# Patient Record
Sex: FEMALE | Race: WHITE | Employment: OTHER | URBAN - METROPOLITAN AREA
[De-identification: names, ages, dates, MRNs, and addresses within clinical notes are randomized per-mention and may not be internally consistent; named-entity substitution may affect disease eponyms.]

---

## 2017-07-23 ENCOUNTER — ANESTHESIA (OUTPATIENT)
Dept: OPERATING ROOM | Age: 37
End: 2017-07-23
Payer: COMMERCIAL

## 2017-07-23 ENCOUNTER — APPOINTMENT (OUTPATIENT)
Dept: ULTRASOUND IMAGING | Age: 37
End: 2017-07-23
Payer: COMMERCIAL

## 2017-07-23 ENCOUNTER — ANESTHESIA EVENT (OUTPATIENT)
Dept: OPERATING ROOM | Age: 37
End: 2017-07-23
Payer: COMMERCIAL

## 2017-07-23 ENCOUNTER — HOSPITAL ENCOUNTER (EMERGENCY)
Age: 37
Discharge: HOME | End: 2017-07-23
Attending: EMERGENCY MEDICINE | Admitting: OBSTETRICS & GYNECOLOGY
Payer: COMMERCIAL

## 2017-07-23 VITALS
DIASTOLIC BLOOD PRESSURE: 58 MMHG | HEART RATE: 77 BPM | HEIGHT: 69 IN | BODY MASS INDEX: 25.92 KG/M2 | RESPIRATION RATE: 16 BRPM | TEMPERATURE: 97.9 F | SYSTOLIC BLOOD PRESSURE: 144 MMHG | OXYGEN SATURATION: 100 % | WEIGHT: 175 LBS

## 2017-07-23 VITALS — DIASTOLIC BLOOD PRESSURE: 98 MMHG | OXYGEN SATURATION: 100 % | TEMPERATURE: 97.8 F | SYSTOLIC BLOOD PRESSURE: 138 MMHG

## 2017-07-23 DIAGNOSIS — O03.9 SPONTANEOUS ABORTION: Primary | ICD-10-CM

## 2017-07-23 DIAGNOSIS — D72.828 OTHER ELEVATED WHITE BLOOD CELL (WBC) COUNT: ICD-10-CM

## 2017-07-23 LAB
-: ABNORMAL
ABO/RH: NORMAL
AMORPHOUS: ABNORMAL
ANTIBODY SCREEN: NEGATIVE
ARM BAND NUMBER: NORMAL
BACTERIA: ABNORMAL
BILIRUBIN URINE: NEGATIVE
CASTS UA: ABNORMAL /LPF (ref 0–8)
COLOR: YELLOW
COMMENT UA: ABNORMAL
CRYSTALS, UA: ABNORMAL /HPF
EPITHELIAL CELLS UA: ABNORMAL /HPF (ref 0–5)
EXPIRATION DATE: NORMAL
GLUCOSE URINE: NEGATIVE
HCG QUANTITATIVE: 174 IU/L
HCT VFR BLD CALC: 37 % (ref 36–46)
HEMOGLOBIN: 12 G/DL (ref 12–16)
KETONES, URINE: NEGATIVE
LEUKOCYTE ESTERASE, URINE: NEGATIVE
MCH RBC QN AUTO: 23.4 PG (ref 26–34)
MCHC RBC AUTO-ENTMCNC: 32.5 G/DL (ref 31–37)
MCV RBC AUTO: 72 FL (ref 80–100)
MUCUS: ABNORMAL
NITRITE, URINE: NEGATIVE
OTHER OBSERVATIONS UA: ABNORMAL
PDW BLD-RTO: 18.9 % (ref 12.5–15.4)
PH UA: 6.5 (ref 5–8)
PLATELET # BLD: 323 K/UL (ref 140–450)
PMV BLD AUTO: 9 FL (ref 6–12)
PROTEIN UA: ABNORMAL
RBC # BLD: 5.14 M/UL (ref 4–5.2)
RBC UA: ABNORMAL /HPF (ref 0–4)
RENAL EPITHELIAL, UA: ABNORMAL /HPF
SPECIFIC GRAVITY UA: 1.03 (ref 1–1.03)
TRICHOMONAS: ABNORMAL
TURBIDITY: CLEAR
URINE HGB: ABNORMAL
UROBILINOGEN, URINE: NORMAL
WBC # BLD: 12 K/UL (ref 3.5–11)
WBC UA: ABNORMAL /HPF (ref 0–5)
YEAST: ABNORMAL

## 2017-07-23 PROCEDURE — 85027 COMPLETE CBC AUTOMATED: CPT

## 2017-07-23 PROCEDURE — 6360000002 HC RX W HCPCS: Performed by: NURSE ANESTHETIST, CERTIFIED REGISTERED

## 2017-07-23 PROCEDURE — 87491 CHLMYD TRACH DNA AMP PROBE: CPT

## 2017-07-23 PROCEDURE — 81001 URINALYSIS AUTO W/SCOPE: CPT

## 2017-07-23 PROCEDURE — 86900 BLOOD TYPING SEROLOGIC ABO: CPT

## 2017-07-23 PROCEDURE — 7100000001 HC PACU RECOVERY - ADDTL 15 MIN: Performed by: OBSTETRICS & GYNECOLOGY

## 2017-07-23 PROCEDURE — 87660 TRICHOMONAS VAGIN DIR PROBE: CPT

## 2017-07-23 PROCEDURE — 87510 GARDNER VAG DNA DIR PROBE: CPT

## 2017-07-23 PROCEDURE — 7100000011 HC PHASE II RECOVERY - ADDTL 15 MIN: Performed by: OBSTETRICS & GYNECOLOGY

## 2017-07-23 PROCEDURE — 2500000003 HC RX 250 WO HCPCS: Performed by: NURSE ANESTHETIST, CERTIFIED REGISTERED

## 2017-07-23 PROCEDURE — 87591 N.GONORRHOEAE DNA AMP PROB: CPT

## 2017-07-23 PROCEDURE — 3600000012 HC SURGERY LEVEL 2 ADDTL 15MIN: Performed by: OBSTETRICS & GYNECOLOGY

## 2017-07-23 PROCEDURE — 2580000003 HC RX 258: Performed by: NURSE ANESTHETIST, CERTIFIED REGISTERED

## 2017-07-23 PROCEDURE — 7100000010 HC PHASE II RECOVERY - FIRST 15 MIN: Performed by: OBSTETRICS & GYNECOLOGY

## 2017-07-23 PROCEDURE — 86901 BLOOD TYPING SEROLOGIC RH(D): CPT

## 2017-07-23 PROCEDURE — 88300 SURGICAL PATH GROSS: CPT

## 2017-07-23 PROCEDURE — 88305 TISSUE EXAM BY PATHOLOGIST: CPT

## 2017-07-23 PROCEDURE — 3600000002 HC SURGERY LEVEL 2 BASE: Performed by: OBSTETRICS & GYNECOLOGY

## 2017-07-23 PROCEDURE — 6360000002 HC RX W HCPCS: Performed by: EMERGENCY MEDICINE

## 2017-07-23 PROCEDURE — 6370000000 HC RX 637 (ALT 250 FOR IP): Performed by: STUDENT IN AN ORGANIZED HEALTH CARE EDUCATION/TRAINING PROGRAM

## 2017-07-23 PROCEDURE — 6360000002 HC RX W HCPCS: Performed by: OBSTETRICS & GYNECOLOGY

## 2017-07-23 PROCEDURE — 96374 THER/PROPH/DIAG INJ IV PUSH: CPT

## 2017-07-23 PROCEDURE — 2580000003 HC RX 258: Performed by: OBSTETRICS & GYNECOLOGY

## 2017-07-23 PROCEDURE — 87480 CANDIDA DNA DIR PROBE: CPT

## 2017-07-23 PROCEDURE — 3700000001 HC ADD 15 MINUTES (ANESTHESIA): Performed by: OBSTETRICS & GYNECOLOGY

## 2017-07-23 PROCEDURE — 6370000000 HC RX 637 (ALT 250 FOR IP): Performed by: ANESTHESIOLOGY

## 2017-07-23 PROCEDURE — 87086 URINE CULTURE/COLONY COUNT: CPT

## 2017-07-23 PROCEDURE — 59821 TREATMENT OF MISCARRIAGE: CPT | Performed by: OBSTETRICS & GYNECOLOGY

## 2017-07-23 PROCEDURE — 3700000000 HC ANESTHESIA ATTENDED CARE: Performed by: OBSTETRICS & GYNECOLOGY

## 2017-07-23 PROCEDURE — 6370000000 HC RX 637 (ALT 250 FOR IP): Performed by: EMERGENCY MEDICINE

## 2017-07-23 PROCEDURE — 86850 RBC ANTIBODY SCREEN: CPT

## 2017-07-23 PROCEDURE — 99284 EMERGENCY DEPT VISIT MOD MDM: CPT

## 2017-07-23 PROCEDURE — 84702 CHORIONIC GONADOTROPIN TEST: CPT

## 2017-07-23 PROCEDURE — 7100000000 HC PACU RECOVERY - FIRST 15 MIN: Performed by: OBSTETRICS & GYNECOLOGY

## 2017-07-23 RX ORDER — DOXYCYCLINE HYCLATE 100 MG
200 TABLET ORAL ONCE
Status: COMPLETED | OUTPATIENT
Start: 2017-07-23 | End: 2017-07-23

## 2017-07-23 RX ORDER — LIDOCAINE HYDROCHLORIDE 10 MG/ML
INJECTION, SOLUTION EPIDURAL; INFILTRATION; INTRACAUDAL; PERINEURAL PRN
Status: DISCONTINUED | OUTPATIENT
Start: 2017-07-23 | End: 2017-07-23 | Stop reason: SDUPTHER

## 2017-07-23 RX ORDER — MIDAZOLAM HYDROCHLORIDE 1 MG/ML
INJECTION INTRAMUSCULAR; INTRAVENOUS PRN
Status: DISCONTINUED | OUTPATIENT
Start: 2017-07-23 | End: 2017-07-23 | Stop reason: SDUPTHER

## 2017-07-23 RX ORDER — PROPOFOL 10 MG/ML
INJECTION, EMULSION INTRAVENOUS PRN
Status: DISCONTINUED | OUTPATIENT
Start: 2017-07-23 | End: 2017-07-23 | Stop reason: SDUPTHER

## 2017-07-23 RX ORDER — SODIUM CHLORIDE 9 MG/ML
INJECTION, SOLUTION INTRAVENOUS CONTINUOUS PRN
Status: DISCONTINUED | OUTPATIENT
Start: 2017-07-23 | End: 2017-07-23 | Stop reason: SDUPTHER

## 2017-07-23 RX ORDER — DIPHENHYDRAMINE HYDROCHLORIDE 50 MG/ML
INJECTION INTRAMUSCULAR; INTRAVENOUS PRN
Status: DISCONTINUED | OUTPATIENT
Start: 2017-07-23 | End: 2017-07-23 | Stop reason: SDUPTHER

## 2017-07-23 RX ORDER — ONDANSETRON 2 MG/ML
INJECTION INTRAMUSCULAR; INTRAVENOUS PRN
Status: DISCONTINUED | OUTPATIENT
Start: 2017-07-23 | End: 2017-07-23 | Stop reason: SDUPTHER

## 2017-07-23 RX ORDER — DEXAMETHASONE SODIUM PHOSPHATE 10 MG/ML
INJECTION INTRAMUSCULAR; INTRAVENOUS PRN
Status: DISCONTINUED | OUTPATIENT
Start: 2017-07-23 | End: 2017-07-23 | Stop reason: SDUPTHER

## 2017-07-23 RX ORDER — METRONIDAZOLE 500 MG/1
500 TABLET ORAL 2 TIMES DAILY
Qty: 10 TABLET | Refills: 0 | Status: SHIPPED | OUTPATIENT
Start: 2017-07-23 | End: 2017-07-28

## 2017-07-23 RX ORDER — FENTANYL CITRATE 50 UG/ML
INJECTION, SOLUTION INTRAMUSCULAR; INTRAVENOUS PRN
Status: DISCONTINUED | OUTPATIENT
Start: 2017-07-23 | End: 2017-07-23 | Stop reason: SDUPTHER

## 2017-07-23 RX ORDER — OXYCODONE HYDROCHLORIDE AND ACETAMINOPHEN 5; 325 MG/1; MG/1
1 TABLET ORAL EVERY 4 HOURS PRN
Qty: 30 TABLET | Refills: 0 | Status: SHIPPED | OUTPATIENT
Start: 2017-07-23 | End: 2019-03-28 | Stop reason: ALTCHOICE

## 2017-07-23 RX ORDER — MORPHINE SULFATE 4 MG/ML
4 INJECTION, SOLUTION INTRAMUSCULAR; INTRAVENOUS ONCE
Status: COMPLETED | OUTPATIENT
Start: 2017-07-23 | End: 2017-07-23

## 2017-07-23 RX ORDER — PROMETHAZINE HYDROCHLORIDE 25 MG/ML
6.25 INJECTION, SOLUTION INTRAMUSCULAR; INTRAVENOUS
Status: DISCONTINUED | OUTPATIENT
Start: 2017-07-23 | End: 2017-07-23 | Stop reason: HOSPADM

## 2017-07-23 RX ORDER — KETOROLAC TROMETHAMINE 30 MG/ML
30 INJECTION, SOLUTION INTRAMUSCULAR; INTRAVENOUS ONCE
Status: COMPLETED | OUTPATIENT
Start: 2017-07-23 | End: 2017-07-23

## 2017-07-23 RX ORDER — FLUCONAZOLE 100 MG/1
200 TABLET ORAL DAILY
Qty: 2 TABLET | Refills: 1 | Status: SHIPPED | OUTPATIENT
Start: 2017-07-23 | End: 2017-07-24

## 2017-07-23 RX ORDER — SUCCINYLCHOLINE CHLORIDE 20 MG/ML
INJECTION INTRAMUSCULAR; INTRAVENOUS PRN
Status: DISCONTINUED | OUTPATIENT
Start: 2017-07-23 | End: 2017-07-23 | Stop reason: SDUPTHER

## 2017-07-23 RX ORDER — OXYCODONE HYDROCHLORIDE AND ACETAMINOPHEN 5; 325 MG/1; MG/1
2 TABLET ORAL
Status: COMPLETED | OUTPATIENT
Start: 2017-07-23 | End: 2017-07-23

## 2017-07-23 RX ORDER — IBUPROFEN 800 MG/1
800 TABLET ORAL EVERY 8 HOURS PRN
Qty: 30 TABLET | Refills: 1 | Status: SHIPPED | OUTPATIENT
Start: 2017-07-23 | End: 2019-03-28 | Stop reason: ALTCHOICE

## 2017-07-23 RX ORDER — MAGNESIUM HYDROXIDE 1200 MG/15ML
LIQUID ORAL CONTINUOUS PRN
Status: DISCONTINUED | OUTPATIENT
Start: 2017-07-23 | End: 2017-07-23 | Stop reason: HOSPADM

## 2017-07-23 RX ORDER — MORPHINE SULFATE 2 MG/ML
INJECTION, SOLUTION INTRAMUSCULAR; INTRAVENOUS
Status: DISCONTINUED
Start: 2017-07-23 | End: 2017-07-23 | Stop reason: HOSPADM

## 2017-07-23 RX ORDER — METHYLERGONOVINE MALEATE 0.2 MG/1
0.2 TABLET ORAL 4 TIMES DAILY
Qty: 4 TABLET | Refills: 0 | Status: SHIPPED | OUTPATIENT
Start: 2017-07-23 | End: 2017-07-24

## 2017-07-23 RX ORDER — FENTANYL CITRATE 50 UG/ML
25 INJECTION, SOLUTION INTRAMUSCULAR; INTRAVENOUS EVERY 5 MIN PRN
Status: DISCONTINUED | OUTPATIENT
Start: 2017-07-23 | End: 2017-07-23 | Stop reason: HOSPADM

## 2017-07-23 RX ORDER — METHYLERGONOVINE MALEATE 0.2 MG/ML
INJECTION INTRAVENOUS PRN
Status: DISCONTINUED | OUTPATIENT
Start: 2017-07-23 | End: 2017-07-23 | Stop reason: SDUPTHER

## 2017-07-23 RX ADMIN — SUCCINYLCHOLINE CHLORIDE 100 MG: 20 INJECTION, SOLUTION INTRAMUSCULAR; INTRAVENOUS at 19:36

## 2017-07-23 RX ADMIN — FENTANYL CITRATE 100 MCG: 50 INJECTION, SOLUTION INTRAMUSCULAR; INTRAVENOUS at 19:36

## 2017-07-23 RX ADMIN — DOXYCYCLINE HYCLATE 200 MG: 100 TABLET, COATED ORAL at 18:41

## 2017-07-23 RX ADMIN — SODIUM CHLORIDE: 9 INJECTION, SOLUTION INTRAVENOUS at 19:31

## 2017-07-23 RX ADMIN — DEXAMETHASONE SODIUM PHOSPHATE 5 MG: 10 INJECTION INTRAMUSCULAR; INTRAVENOUS at 19:41

## 2017-07-23 RX ADMIN — DOXYCYCLINE HYCLATE 200 MG: 100 TABLET, COATED ORAL at 20:35

## 2017-07-23 RX ADMIN — METHYLERGONOVINE MALEATE 200 MCG: 0.2 INJECTION INTRAMUSCULAR; INTRAVENOUS at 20:04

## 2017-07-23 RX ADMIN — MIDAZOLAM HYDROCHLORIDE 2 MG: 1 INJECTION, SOLUTION INTRAMUSCULAR; INTRAVENOUS at 19:15

## 2017-07-23 RX ADMIN — KETOROLAC TROMETHAMINE 30 MG: 30 INJECTION, SOLUTION INTRAMUSCULAR at 20:40

## 2017-07-23 RX ADMIN — MORPHINE SULFATE 4 MG: 4 INJECTION, SOLUTION INTRAMUSCULAR; INTRAVENOUS at 18:21

## 2017-07-23 RX ADMIN — OXYCODONE HYDROCHLORIDE AND ACETAMINOPHEN 2 TABLET: 5; 325 TABLET ORAL at 21:08

## 2017-07-23 RX ADMIN — PROPOFOL 160 MG: 10 INJECTION, EMULSION INTRAVENOUS at 19:36

## 2017-07-23 RX ADMIN — LIDOCAINE HYDROCHLORIDE 50 MG: 10 INJECTION, SOLUTION EPIDURAL; INFILTRATION; INTRACAUDAL; PERINEURAL at 19:36

## 2017-07-23 RX ADMIN — DIPHENHYDRAMINE HYDROCHLORIDE 12.5 MG: 50 INJECTION, SOLUTION INTRAMUSCULAR; INTRAVENOUS at 19:41

## 2017-07-23 RX ADMIN — ONDANSETRON 4 MG: 2 INJECTION INTRAMUSCULAR; INTRAVENOUS at 20:08

## 2017-07-23 ASSESSMENT — PAIN SCALES - GENERAL
PAINLEVEL_OUTOF10: 6
PAINLEVEL_OUTOF10: 5
PAINLEVEL_OUTOF10: 10
PAINLEVEL_OUTOF10: 0
PAINLEVEL_OUTOF10: 4

## 2017-07-23 ASSESSMENT — PAIN DESCRIPTION - LOCATION
LOCATION: ABDOMEN;BACK;LEG;SHOULDER
LOCATION: ABDOMEN
LOCATION: ABDOMEN

## 2017-07-23 ASSESSMENT — PAIN DESCRIPTION - PROGRESSION: CLINICAL_PROGRESSION: NOT CHANGED

## 2017-07-23 ASSESSMENT — PAIN DESCRIPTION - PAIN TYPE
TYPE: ACUTE PAIN

## 2017-07-23 ASSESSMENT — ENCOUNTER SYMPTOMS
NAUSEA: 0
SHORTNESS OF BREATH: 0
VOMITING: 0
ABDOMINAL PAIN: 1
SHORTNESS OF BREATH: 0

## 2017-07-23 ASSESSMENT — PAIN DESCRIPTION - DESCRIPTORS
DESCRIPTORS: CRAMPING
DESCRIPTORS: CRAMPING

## 2017-07-23 ASSESSMENT — PAIN DESCRIPTION - ORIENTATION
ORIENTATION: RIGHT;LEFT
ORIENTATION: LOWER

## 2017-07-23 ASSESSMENT — PAIN DESCRIPTION - FREQUENCY
FREQUENCY: CONTINUOUS
FREQUENCY: CONTINUOUS

## 2017-07-24 LAB
CULTURE: NORMAL
CULTURE: NORMAL
DIRECT EXAM: NORMAL
DIRECT EXAM: NORMAL
Lab: NORMAL
Lab: NORMAL
SPECIMEN DESCRIPTION: NORMAL
SPECIMEN DESCRIPTION: NORMAL
STATUS: NORMAL
STATUS: NORMAL

## 2017-07-25 ENCOUNTER — TELEPHONE (OUTPATIENT)
Dept: OBGYN CLINIC | Age: 37
End: 2017-07-25

## 2017-07-27 LAB — SURGICAL PATHOLOGY REPORT: NORMAL

## 2019-03-28 ENCOUNTER — HOSPITAL ENCOUNTER (OUTPATIENT)
Age: 39
Setting detail: SPECIMEN
Discharge: HOME | End: 2019-03-28
Payer: COMMERCIAL

## 2019-03-28 ENCOUNTER — OFFICE VISIT (OUTPATIENT)
Dept: OBGYN CLINIC | Age: 39
End: 2019-03-28
Payer: COMMERCIAL

## 2019-03-28 VITALS
SYSTOLIC BLOOD PRESSURE: 130 MMHG | BODY MASS INDEX: 32.73 KG/M2 | WEIGHT: 221 LBS | DIASTOLIC BLOOD PRESSURE: 80 MMHG | HEIGHT: 69 IN

## 2019-03-28 DIAGNOSIS — Z01.419 VISIT FOR GYNECOLOGIC EXAMINATION: Primary | ICD-10-CM

## 2019-03-28 DIAGNOSIS — N89.8 VAGINAL DISCHARGE: ICD-10-CM

## 2019-03-28 DIAGNOSIS — R10.2 PELVIC PAIN IN FEMALE: ICD-10-CM

## 2019-03-28 DIAGNOSIS — N92.1 MENORRHAGIA WITH IRREGULAR CYCLE: ICD-10-CM

## 2019-03-28 LAB
DIRECT EXAM: NORMAL
Lab: NORMAL
SPECIMEN DESCRIPTION: NORMAL

## 2019-03-28 PROCEDURE — 99395 PREV VISIT EST AGE 18-39: CPT | Performed by: OBSTETRICS & GYNECOLOGY

## 2019-03-28 ASSESSMENT — ENCOUNTER SYMPTOMS
ABDOMINAL DISTENTION: 0
ABDOMINAL PAIN: 0
DIARRHEA: 0
CONSTIPATION: 0
COUGH: 0
BACK PAIN: 0
SHORTNESS OF BREATH: 0

## 2019-03-29 ENCOUNTER — TELEPHONE (OUTPATIENT)
Dept: OBGYN CLINIC | Age: 39
End: 2019-03-29

## 2019-03-29 LAB
C TRACH DNA GENITAL QL NAA+PROBE: NEGATIVE
N. GONORRHOEAE DNA: NEGATIVE
SPECIMEN DESCRIPTION: NORMAL

## 2019-03-29 NOTE — TELEPHONE ENCOUNTER
Pt notified the vaginal cultures were negative for BV, gonorrhea and chlamydia. She continues to have foul odor and large amt of watery discharge. She states this happened several years ago, was put on 7 days of flagyl and it got better.

## 2019-04-02 ENCOUNTER — PROCEDURE VISIT (OUTPATIENT)
Dept: OBGYN CLINIC | Age: 39
End: 2019-04-02
Payer: COMMERCIAL

## 2019-04-02 DIAGNOSIS — R10.2 PELVIC PAIN IN FEMALE: ICD-10-CM

## 2019-04-02 DIAGNOSIS — N92.1 MENORRHAGIA WITH IRREGULAR CYCLE: ICD-10-CM

## 2019-04-02 PROCEDURE — 76830 TRANSVAGINAL US NON-OB: CPT | Performed by: OBSTETRICS & GYNECOLOGY

## 2019-04-02 PROCEDURE — 76856 US EXAM PELVIC COMPLETE: CPT | Performed by: OBSTETRICS & GYNECOLOGY

## 2019-04-09 ENCOUNTER — HOSPITAL ENCOUNTER (OUTPATIENT)
Age: 39
Setting detail: SPECIMEN
Discharge: HOME | End: 2019-04-09
Payer: COMMERCIAL

## 2019-04-09 ENCOUNTER — INITIAL CONSULT (OUTPATIENT)
Dept: OBGYN CLINIC | Age: 39
End: 2019-04-09
Payer: COMMERCIAL

## 2019-04-09 VITALS
BODY MASS INDEX: 32.73 KG/M2 | DIASTOLIC BLOOD PRESSURE: 86 MMHG | SYSTOLIC BLOOD PRESSURE: 162 MMHG | WEIGHT: 221 LBS | HEIGHT: 69 IN

## 2019-04-09 DIAGNOSIS — N94.6 DYSMENORRHEA: ICD-10-CM

## 2019-04-09 DIAGNOSIS — R10.2 PELVIC PAIN: ICD-10-CM

## 2019-04-09 DIAGNOSIS — N94.10 DYSPAREUNIA IN FEMALE: ICD-10-CM

## 2019-04-09 DIAGNOSIS — N93.9 ABNORMAL UTERINE BLEEDING (AUB): ICD-10-CM

## 2019-04-09 DIAGNOSIS — Z15.89 HOMOZYGOUS MTHFR MUTATION A1298C: ICD-10-CM

## 2019-04-09 DIAGNOSIS — Z01.818 PREOPERATIVE EXAM FOR GYNECOLOGIC SURGERY: Primary | ICD-10-CM

## 2019-04-09 DIAGNOSIS — R10.2 PELVIC PAIN IN FEMALE: ICD-10-CM

## 2019-04-09 PROBLEM — O03.9 SPONTANEOUS ABORTION: Status: RESOLVED | Noted: 2017-07-23 | Resolved: 2019-04-09

## 2019-04-09 LAB — CYTOLOGY REPORT: NORMAL

## 2019-04-09 PROCEDURE — 99213 OFFICE O/P EST LOW 20 MIN: CPT | Performed by: OBSTETRICS & GYNECOLOGY

## 2019-04-09 RX ORDER — MEDROXYPROGESTERONE ACETATE 10 MG/1
10 TABLET ORAL DAILY
Qty: 10 TABLET | Refills: 0 | Status: SHIPPED | OUTPATIENT
Start: 2019-04-09 | End: 2019-04-22

## 2019-04-09 RX ORDER — TRANEXAMIC ACID 650 1/1
1300 TABLET ORAL 3 TIMES DAILY
Qty: 30 TABLET | Refills: 0 | Status: SHIPPED | OUTPATIENT
Start: 2019-04-09 | End: 2019-04-22

## 2019-04-09 RX ORDER — IBUPROFEN 200 MG
200 TABLET ORAL EVERY 6 HOURS PRN
Status: ON HOLD | COMMUNITY
End: 2019-06-14 | Stop reason: HOSPADM

## 2019-04-09 RX ORDER — DOXYCYCLINE HYCLATE 100 MG
100 TABLET ORAL 2 TIMES DAILY
Qty: 14 TABLET | Refills: 0 | Status: SHIPPED | OUTPATIENT
Start: 2019-04-09 | End: 2019-04-16

## 2019-04-09 NOTE — PROGRESS NOTES
[address] [place] [address] [address]  [place], [facility]  Phone: [phone]  Fax: [phone]    Pre-operative Orders    Eddie Manuel  1980  2019  Primary Care Physician: Shanika De Leon MD    HISTORY & PHYSICAL      2019       HOSPITAL: [facility]    HPI: Eddie Manuel is a 41 y.o. female [id number] with a long history of AUB. Periods are irregular in frequency, but often last 10 days and she passes large clots (quarter to lemon sized). She changes a pad every 45 minutes. Periods are unpredictable in frequency, anywhere from 2-4 weeks. She also has dysmenorrhea and dyspareunia (pain with deep penetration). Ultrasound was done in the office one week ago (19) and showed an 8.65 cm uterus with a 4.89 cm endometrium and a small subserosal fibroid in the lower uterine segment, as well as a 3 cm simple appearing right ovarian cyst. Her surgical history is significant for a laparoscopic appendectomy in  at age 17 with concomittant ovarian cystectomy. Her obstetric history is significant for 4 vaginal deliveries. She denies stress urinary incontinence symptoms. She is unable to take estrogen containing OCPs due to a DVT on OCPs previously, and has severe mood symptoms on progestin only methods. She is concerned about the IUD because she is prone to bacterial vaginosis. She desires definitive management in the form of hysterectomy. 1. Preoperative exam for gynecologic surgery    2. Dysmenorrhea    3. Abnormal uterine bleeding (AUB)    4. Pelvic pain in female    5. Homozygous MTHFR mutation [id number] (HCC)    6. Pelvic pain    7.  Dyspareunia in female       Patient Active Problem List   Diagnosis    Asthma     Recurrent early pregnancy loss    Homozygous MTHFR mutation I4059V (HCC)    Abnormal uterine bleeding (AUB)    Pelvic pain    Dysmenorrhea    Dyspareunia in female       OB History    Para Term  AB Living   9 4 3 1 4 3   SAB TAB Ectopic Molar Multiple Live Births   4 0 0 0 0 5      # Outcome Date GA Lbr Wally/2nd Weight Sex Delivery Anes PTL Lv   9             8 Term 16 38w4d  8 lb 9.2 oz (3.89 kg) F Vag-Spont EPI  BRYNN      Name: Nelson Juarez: 8  Apgar5: 9   7 2014           6 SAB 14    F Vag-Spont   ND   5 Term  39w0d   M Vag-Spont   BRYNN   4   33w0d   M Vag-Spont   BRYNN      Birth Comments: spont labor   3 Term 2003 37w0d   F Vag-Spont   ND      Birth Comments: stilborn cord knot   2 2003           1 2002              Obstetric Comments   12 / q month till 8 months ago, now q 2weeks to q 3 months / 5-7 days / 5 heavy (super pad or double q 1-2) / clots lemon sized / cramps bad enough to keep her off feet     Past Medical History:   Diagnosis Date    Abnormal pap     \" thought it was due to yeast infection\"    Allergic     heparin    Anemia     briefly RT bleeding    Asthma     Chronic kidney disease     frequent UTI during past pregnancy    Gestational HTN 2016    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       Past Surgical History:   Procedure Laterality Date    APPENDECTOMY      laparoscopy--also 2 left one right ovarian cystectomies    DILATION AND CURETTAGE OF UTERUS  14    DILATION AND CURETTAGE OF UTERUS  2017    LAPAROSCOPY  2014    exploratory laparoscopy    LASIK Bilateral 2013    MO INDUCED ABORTN BY DIL/EVAC N/A 2017    DILATATION AND CURETTAGE SUCTION performed by Kaity Mancilla MD at [address]  2000       Social History     Socioeconomic History    Marital status:      Spouse name: Not on file    Number of children: Not on file    Years of education: Not on file    Highest education level: Not on file   Occupational History    Not on file   Social Needs    Financial resource strain: Not on file    Food insecurity:     Worry: Not on file     Inability: Not on file   [facility] Transportation needs:     Medical: Not on file     Non-medical: Not on file   Tobacco Use    Smoking status: Never Smoker    Smokeless tobacco: Never Used   Substance and Sexual Activity    Alcohol use: Yes     Alcohol/week: 0.0 oz     Comment: rarely    Drug use: No    Sexual activity: Yes     Partners: Male   Lifestyle    Physical activity:     Days per week: Not on file     Minutes per session: Not on file    Stress: Not on file   Relationships    Social connections:     Talks on phone: Not on file     Gets together: Not on file     Attends Hindu service: Not on file     Active member of club or organization: Not on file     Attends meetings of clubs or organizations: Not on file     Relationship status: Not on file    Intimate partner violence:     Fear of current or ex partner: Not on file     Emotionally abused: Not on file     Physically abused: Not on file     Forced sexual activity: Not on file   Other Topics Concern    Not on file   Social History Narrative    Not on file         MEDICATIONS:  Current Outpatient Medications   Medication Sig Dispense Refill    ibuprofen (ADVIL;MOTRIN) 200 MG tablet Take 200 mg by mouth every 6 hours as needed for Pain      medroxyPROGESTERone (PROVERA) 10 MG tablet Take 1 tablet by mouth daily for 10 days 10 tablet 0    tranexamic acid (LYSTEDA) 650 MG TABS tablet Take 2 tablets by mouth 3 times daily for 5 days 30 tablet 0    doxycycline hyclate (VIBRA-TABS) 100 MG tablet Take 1 tablet by mouth 2 times daily for 7 days 14 tablet 0    acetaminophen (TYLENOL) 325 MG tablet Take 650 mg by mouth every 6 hours as needed for Pain       No current facility-administered medications for this visit. ALLERGIES:  Allergies as of 04/09/2019 - Review Complete 04/09/2019   Allergen Reaction Noted    Heparin Swelling 12/05/2014    Codeine Hives 07/23/2017         REVIEW OF SYSTEMS:  General appearance:Appears healthy. Alert; in no acute distress. Pleasant. CARDIOVASCULAR: Denies: chest pain, dyspnea on exertion, palpitations  RESPIRATORY: Denies: cough, shortness of breath, wheezing  GI: Denies: abdominal pain, flank pain, nausea, vomiting, diarrhea  : Denies: dysuria, frequency/urgency, hematuria, pelvic pain;                                        MUSCULOSKELETAL: Denies: back pain, joint swelling, muscle pain  SKIN: Denies: rash or hives  NEUROLOGIC: Denies Headache, Migraines, CVA, TIA  HEMATOLOGIC: Denies Bleeding Disorder, Coagulopathy or Transfusion History    Blood pressure (!) 162/86, height 5' 9\" (1.753 m), weight 221 lb (100.2 kg), last menstrual period 03/28/2019, not currently breastfeeding. General Appearance:  awake, alert, oriented, in no acute distress  Skin:  Skin color, texture, turgor normal. No rashes or lesions. Mouth/Throat:  Mucosa moist.  No lesions. Pharynx without erythema, edema or exudate. Neck:  neck- supple, no mass, non-tender  Lungs:  Normal expansion. Clear to auscultation. No rales, rhonchi, or wheezing. Heart:  Heart sounds are normal.  Regular rate and rhythm without murmur, gallop or rub. Abdomen:  Soft, non-tender, normal bowel sounds. No bruits, organomegaly or masses. Extremities: Extremities warm to touch, pink, with no edema. Musculoskeletal:  Spine range of motion normal. Muscular strength intact. Peripheral Pulses:  Capillary refill <2secs, strong peripheral pulses  Neurologic:  Gait normal. Reflexes normal and symmetric. Sensation grossly intact. Female Urogen:  8 wk size mobile uterus, tenderness on left uterosacral ligament, minimal descensus    Diagnostics:  Us Non Ob Transvaginal    Result Date: 4/2/2019  For results of TVS see Pelvis Complete order for same date of service. Us Pelvis Complete    Result Date: 4/2/2019  8.65 cm uterus with 4.89mm endometrium. Small subserosal fibroid in VENITA.  3 cm simple appearing right ovarian cyst.     Endometrial Biopsy:  The patient was counseled on the procedure. Risks, benefits and alternatives were reviewed. The patient is aware that this is diagnostic and not curative and a second procedure may be needed. A consent was reviewed and obtained. The patient was positioned comfortably on the exam table. A sterile speculum was placed into the vagina and the cervix was identified. It was cleansed with betadine. A single tooth tenaculum was needed to stabilize the cervix. The aspirator was then gently passed into the endometrial cavity. Tissue was obtained and sent to pathology. A bi-manual exam; the uterus was found to be  anteverted with a size of 8 cm. There was no adnexal masses and the bladder was smooth, non-tender and without palpable masses. The patient tolerated the procedure well. Post procedure restrictions were reviewed and given to the patient. All counts and instruments were correct at the end of the procedure. Labs:  Results for orders placed or performed during the hospital encounter of 03/28/19   Chlamydia Trachomatis & Neisseria gonorrhoeae (GC) by amplified detection   Result Value Ref Range    Specimen Description . CERVIX     C. trachomatis DNA NEGATIVE NEGATIVE    N. gonorrhoeae DNA NEGATIVE NEGATIVE   VAGINITIS DNA PROBE   Result Value Ref Range    Specimen Description . VAGINA     Special Requests NOT REPORTED     Direct Exam NEGATIVE for Candida sp. Direct Exam NEGATIVE for Gardnerella vaginalis     Direct Exam NEGATIVE for Trichomonas vaginalis     Direct Exam       Method of testing is a DNA probe intended for detection and identification of Candida species, Gardnerella vaginalis, and Trichomonas vaginalis nucleic acid in vaginal fluid specimens from patients with symptoms of vaginitis/vaginosis. GYN Cytology   Result Value Ref Range    Cytology Report       [id number]  [email or web address]  [facility]  ANATOMIC PATHOLOGY  [address] [place] [address], [facility] [address].   [facility], [address]  [phone]  Fax: [phone]  GYNECOLOGIC CYTOLOGY REPORT    Patient Name: Maggy Torres  MR#: [id number]  Specimen #[id number]  Source:  1: Cervical material, (ThinPrep vial, Imaging-assisted review)    Clinical History  No LMP date given  Z01.419 Routine gyn exam without abnormal findings  High risk HPV DNA testing is requested if the diagnosis is abnormal    INTERPRETATION    Cervical material, (ThinPrep vial, Imaging-assisted review):  Specimen Adequacy:       Satisfactory for evaluation.       - Endocervical/transformation zone component present. Descriptive Diagnosis:       Negative for intraepithelial lesion or malignancy. Cytotechnologist:   Nilda Cones. Gilford Bannister, CT(ASCP)  **Electronically Signed Out**  danielle/2019             ASSESSMENT:     Diagnosis Orders   1. Preoperative exam for gynecologic surgery     2. Dysmenorrhea  ID BIOPSY OF UTERUS LINING   3. Abnormal uterine bleeding (AUB)  CBC    TSH with Reflex    Follicle Stimulating Hormone    Estradiol    Iron And TIBC    Ferritin    ID BIOPSY OF UTERUS LINING   4. Pelvic pain in female  ID BIOPSY OF UTERUS LINING   5. Homozygous MTHFR mutation C2369E ([facility])     6. Pelvic pain     7. Dyspareunia in female         Patient Active Problem List    Diagnosis Date Noted    [facility] O8987Q [facility] 2016     Priority: High    Recurrent early pregnancy loss 12/15/2015     Priority: High     Antiphospholipid syndrome work up negative. MFM ordered more further testing for recurrent loss. She did have cell free dna testing ordered.    Advise interval fetal growth scans every 3-4 weeks for duration of pregnancy and fetal  surveillance from 32 weeks (NST twice weekly, and weekly evaluation of umbilical artery dopplers, CATALINA and BPP testing)        Asthma  12/15/2015     Priority: Low    Abnormal uterine bleeding (AUB) 2019    Pelvic pain 2019    Dysmenorrhea 2019    Dyspareunia in female 2019          Plan:  We discussed treating the chronic vaginal discharge with one week of doxycycline. Will trial tranexamic acid, then Provera to help with heavy bleeding. She has a family vacation to [facility] June 1-10, so will schedule surgery for after this trip. EMB obtained today and CBC, TSH, FSH/estradiol (for hot flushes) ordered. Plan for [facility] with bilateral salpingectomy and cysto at [facility]. Debbie. Suspect endometriosis and adenomyosis.

## 2019-04-11 LAB — SURGICAL PATHOLOGY REPORT: NORMAL

## 2019-04-22 ENCOUNTER — OFFICE VISIT (OUTPATIENT)
Dept: OBGYN CLINIC | Age: 39
End: 2019-04-22
Payer: COMMERCIAL

## 2019-04-22 VITALS
HEIGHT: 69 IN | SYSTOLIC BLOOD PRESSURE: 128 MMHG | WEIGHT: 221.4 LBS | BODY MASS INDEX: 32.79 KG/M2 | DIASTOLIC BLOOD PRESSURE: 76 MMHG

## 2019-04-22 DIAGNOSIS — Z15.89 HOMOZYGOUS MTHFR MUTATION A1298C: ICD-10-CM

## 2019-04-22 DIAGNOSIS — J45.909 ASTHMA AFFECTING PREGNANCY, ANTEPARTUM: ICD-10-CM

## 2019-04-22 DIAGNOSIS — N94.10 DYSPAREUNIA IN FEMALE: ICD-10-CM

## 2019-04-22 DIAGNOSIS — N94.6 DYSMENORRHEA: ICD-10-CM

## 2019-04-22 DIAGNOSIS — N93.9 ABNORMAL UTERINE BLEEDING (AUB): Primary | ICD-10-CM

## 2019-04-22 DIAGNOSIS — Z01.818 PREOPERATIVE EXAM FOR GYNECOLOGIC SURGERY: ICD-10-CM

## 2019-04-22 DIAGNOSIS — O26.20 RECURRENT PREGNANCY LOSS, ANTEPARTUM CONDITION OR COMPLICATION: ICD-10-CM

## 2019-04-22 DIAGNOSIS — O99.519 ASTHMA AFFECTING PREGNANCY, ANTEPARTUM: ICD-10-CM

## 2019-04-22 DIAGNOSIS — R10.2 PELVIC PAIN: ICD-10-CM

## 2019-04-22 PROCEDURE — 99213 OFFICE O/P EST LOW 20 MIN: CPT | Performed by: OBSTETRICS & GYNECOLOGY

## 2019-04-22 RX ORDER — METRONIDAZOLE 500 MG/1
500 TABLET ORAL 2 TIMES DAILY
Qty: 14 TABLET | Refills: 0 | Status: SHIPPED | OUTPATIENT
Start: 2019-04-22 | End: 2019-04-29

## 2019-04-22 NOTE — Clinical Note
Pt was told there was no copay for a surgical consult (surgery scheduling visit) so I'm not sure if she should have been booked as a consult rather than an office visit?  Thanks

## 2019-04-22 NOTE — PROGRESS NOTES
[address] [place] [address] [address]  [facility], [facility]  Phone: [phone]  Fax: [phone]    Pre-operative Orders    Cecille Barbosa  1980  2019  Primary Care Physician: Marvin Unger MD    HISTORY & PHYSICAL      2019       HOSPITAL: [facility]    HPI: Cecille Barbosa is a 41 y.o. female [id number] here for a surgery scheduling visit. She has a longstanding history of AUB with heavy periods and inter-menstrual bleeding. She has significant dysmenorrhea as well. She previously had a DVT on OCPs so is not a candidate for estrogen containing therapy, and is worried about the IUD because she is prone to BV. She's tried progestin only pills in the past but had mood symptoms with these. Would like definitive therapy with hysterectomy rather than the ablation which only has a 60-70% chance of amenorrhea. Denies stress incontinence symptoms. 1. Abnormal uterine bleeding (AUB)    2. Preoperative exam for gynecologic surgery    3. Dysmenorrhea    4. Dyspareunia in female    5. Homozygous MTHFR mutation V6921U ([facility]    6. Recurrent early pregnancy loss    7. Asthma     8.  Pelvic pain       Patient Active Problem List   Diagnosis    Asthma     Recurrent early pregnancy loss    Homozygous MTHFR mutation D7983X ([facility])    Abnormal uterine bleeding (AUB)    Pelvic pain    Dysmenorrhea    Dyspareunia in female       OB History    Para Term  AB Living   9 4 3 1 4 3   SAB TAB Ectopic Molar Multiple Live Births   4 0 0 0 0 5      # Outcome Date GA Lbr Wally/2nd Weight Sex Delivery Anes PTL Lv   9             8 Term 16 38w4d  8 lb 9.2 oz (3.89 kg) F Vag-Spont EPI  BRYNN      Name: Alin Raza: 8  Apgar5: 9   7 SAB 2014           6 SAB 14    F Vag-Spont   ND   5 Term  39w0d   M Vag-Spont   BRYNN   4   33w0d   M Vag-Spont   BRYNN      Birth Comments: spont labor   3 Term 2003 37w0d   F Vag-Spont   ND      Birth connections:     Talks on phone: Not on file     Gets together: Not on file     Attends Hinduism service: Not on file     Active member of club or organization: Not on file     Attends meetings of clubs or organizations: Not on file     Relationship status: Not on file    Intimate partner violence:     Fear of current or ex partner: Not on file     Emotionally abused: Not on file     Physically abused: Not on file     Forced sexual activity: Not on file   Other Topics Concern    Not on file   Social History Narrative    Not on file         MEDICATIONS:  Current Outpatient Medications   Medication Sig Dispense Refill    metroNIDAZOLE (FLAGYL) 500 MG tablet Take 1 tablet by mouth 2 times daily for 7 days 14 tablet 0    acetaminophen (TYLENOL) 325 MG tablet Take 650 mg by mouth every 6 hours as needed for Pain      ibuprofen (ADVIL;MOTRIN) 200 MG tablet Take 200 mg by mouth every 6 hours as needed for Pain       No current facility-administered medications for this visit. ALLERGIES:  Allergies as of 04/22/2019 - Review Complete 04/22/2019   Allergen Reaction Noted    Heparin Swelling 12/05/2014    Codeine Hives 07/23/2017       REVIEW OF SYSTEMS:  General appearance:Appears healthy. Alert; in no acute distress. Pleasant. CARDIOVASCULAR: Denies: chest pain, dyspnea on exertion, palpitations  RESPIRATORY: Denies: cough, shortness of breath, wheezing  GI: Denies: abdominal pain, flank pain, nausea, vomiting, diarrhea  : Denies: dysuria, frequency/urgency, hematuria, pelvic pain;                                          MUSCULOSKELETAL: Denies: back pain, joint swelling, muscle pain  SKIN: Denies: rash or hives  NEUROLOGIC: Denies Headache, Migraines, CVA, TIA  HEMATOLOGIC: Denies Bleeding Disorder, Coagulopathy or Transfusion History    Blood pressure 128/76, height 5' 9.02\" (1.753 m), weight 221 lb 6.4 oz (100.4 kg), last menstrual period 03/28/2019, not currently breastfeeding.     Physical Exam Constitutional: She is oriented to person, place, and time. She appears well-developed and well-nourished. HENT:   Head: Normocephalic. Neck: No JVD present. No thyromegaly present. Cardiovascular: Normal rate and regular rhythm. Pulmonary/Chest: Effort normal and breath sounds normal. No respiratory distress. She has no wheezes. She has no rales. She exhibits no tenderness. Abdominal: Soft. Bowel sounds are normal. She exhibits no distension. There is no tenderness. Genitourinary: Pelvic exam was performed with patient supine. Musculoskeletal: Normal range of motion. Lymphadenopathy:     She has no cervical adenopathy. Neurological: She is alert and oriented to person, place, and time. She has normal reflexes. Skin: Skin is warm and dry. Psychiatric: She has a normal mood and affect. Her behavior is normal. Judgment and thought content normal.       Diagnostics:  Us Non Ob Transvaginal    Result Date: 4/2/2019  For results of TVS see Pelvis Complete order for same date of service. Us Pelvis Complete    Result Date: 4/2/2019  8.65 cm uterus with 4.89mm endometrium. Small subserosal fibroid in VENITA. 3 cm simple appearing right ovarian cyst.       Labs:  Results for orders placed or performed during the hospital encounter of 04/09/19   Surgical Pathology   Result Value Ref Range    Surgical Pathology Report       [place] [address]. [place], [address]  [phone]  Fax: [phone]    [facility]     Patient Name: Shady Drake: [id number]  Path Number: [id number]  Collected: 4/9/2019  Received: 4/10/2019  Reported: 4/11/2019 10:09    -- Diagnosis --  ENDOMETRIUM, BIOPSY:       -PROLIFERATIVE PHASE ENDOMETRIUM. -NEGATIVE FOR HYPERPLASIA, ATYPIA OR MALIGNANCY. Nidhi John M.D.  **Electronically Signed Out**         jet/4/11/2019       Clinical Information  Pre-op Diagnosis:  DYSMENORRHEA, ABNORMAL UTERINE BLEEDING, PELVIC  PAIN IN FEMALE   Operative Findings:  ENDOMETRIAL LINING  Operation Performed:  BIOPSY OF UTERUS LINING     Source of Specimen  1: ENDOMETRIAL LINING    Gross Description  \"LIV Roca Saqi, EMB\" Pink-red fragments, 2.5 x 1.5 x 0.1 cm in  aggregate. Entirely 1cs. po tm      Microscopic Description  Fragments of endometrial glands  and stroma show proliferative phase  glands. There is no evidence of hyperplasia, atypia or malignancy. ASSESSMENT:     Diagnosis Orders   1. Abnormal uterine bleeding (AUB)     2. Preoperative exam for gynecologic surgery     3. Dysmenorrhea     4. Dyspareunia in female     5. Homozygous MTHFR mutation Q2724L ([facility]     6. Recurrent early pregnancy loss     7. Asthma      8. Pelvic pain         Patient Active Problem List    Diagnosis Date Noted    Homozygous MTHFR mutation C3469P ([facility] 2016     Priority: High    Recurrent early pregnancy loss 12/15/2015     Priority: High     Antiphospholipid syndrome work up negative. M ordered more further testing for recurrent loss. She did have cell free dna testing ordered.    Advise interval fetal growth scans every 3-4 weeks for duration of pregnancy and fetal  surveillance from 32 weeks (NST twice weekly, and weekly evaluation of umbilical artery dopplers, CATALINA and BPP testing)        Asthma  12/15/2015     Priority: Low    Abnormal uterine bleeding (AUB) 2019    Pelvic pain 2019    Dysmenorrhea 2019    Dyspareunia in female 2019          Plan:  RTLH, BS, cysto, ovarian cystectomy, fulguration of endometriosis [facility] 19 12:30 PM    Orders:    CBC:Yes                                                             NPO after midnight   Type & Screen:Yes  PAT Appointment:Yes, 19 at 1300  Anesthesia Protocol: Yes  Urinalysis: No                                                    PT/PTT: No    Urine HCG: Yes                                                  Liver Function Studies: No     CMP: No                                                                BMP: No     EKG: No                                                                 Chest X-ray: No     Other testing: None                                Bowel Prep Instructions: No  Other Special Instructions: None  Antibiotics within one hour prior to surgery: 2 g Ancef  Heparin 5000 units subcutaneously one hour before surgery: No  Consults/Notification: No   Medical Clearance From: No  H&P: to be done morning of  Consent: done    Counseling: The patient was counseled on all options both medical and surgical, conservative as well as definitive. She has elected to proceed with the procedure as stated above. The patient was counseled on the procedure. Risks and complications were reviewed in detail. The patients orders, labs, consents have been completed. The risks specific to this procedure include, but are not limited to, the risk of bleeding (possibly requiring blood transfusion), infection, damage to surrounding bowel, bladder, large blood vessels/nerves in the pelvis, ureters, risk for laparotomy, risk of anesthesia, risk of death. We discussed bilateral salpingectomy to reduce the lifetime risk of ovarian cancer, and the patient does not desire removal of bilateral ovaries given her age, although we did discuss the possibility of ovarian cystectomy and fulguration of endometriosis. The history and physical as well as all supporting surgical documentation will be forwarded to the pre-operative holding area.

## 2019-05-16 ENCOUNTER — TELEPHONE (OUTPATIENT)
Dept: OBGYN | Age: 39
End: 2019-05-16

## 2019-05-16 DIAGNOSIS — N39.3 STRESS INCONTINENCE IN FEMALE: Primary | ICD-10-CM

## 2019-05-16 NOTE — TELEPHONE ENCOUNTER
Referral placed in epic asking if it was possible to do mid urethral sling at 06/14/19 surgery at 12:30 pm at [facility]

## 2019-05-16 NOTE — TELEPHONE ENCOUNTER
Can we place a referral to [facility] for possible mid-urethral sling placement at the time of hysterectomy? Thanks!

## 2019-05-21 ENCOUNTER — OFFICE VISIT (OUTPATIENT)
Dept: UROLOGY | Age: 39
End: 2019-05-21
Payer: COMMERCIAL

## 2019-05-21 VITALS
DIASTOLIC BLOOD PRESSURE: 97 MMHG | SYSTOLIC BLOOD PRESSURE: 144 MMHG | WEIGHT: 217.4 LBS | HEART RATE: 77 BPM | BODY MASS INDEX: 32.2 KG/M2 | TEMPERATURE: 98.8 F | HEIGHT: 69 IN

## 2019-05-21 DIAGNOSIS — N39.3 STRESS INCONTINENCE IN FEMALE: Primary | ICD-10-CM

## 2019-05-21 PROCEDURE — 99204 OFFICE O/P NEW MOD 45 MIN: CPT | Performed by: UROLOGY

## 2019-05-21 ASSESSMENT — ENCOUNTER SYMPTOMS
EYE REDNESS: 0
WHEEZING: 0
BACK PAIN: 0
VOMITING: 0
COUGH: 0
COLOR CHANGE: 0
NAUSEA: 0
SHORTNESS OF BREATH: 0
EYE PAIN: 0
ABDOMINAL PAIN: 1

## 2019-05-21 NOTE — PROGRESS NOTES
Review of Systems   Constitutional: Negative for appetite change, chills and fever. Eyes: Negative for pain, redness and visual disturbance. Respiratory: Negative for cough, shortness of breath and wheezing. Cardiovascular: Negative for chest pain and leg swelling. Gastrointestinal: Positive for abdominal pain. Negative for nausea and vomiting. Genitourinary: Negative for difficulty urinating, dysuria, flank pain, frequency, hematuria and vaginal discharge. Musculoskeletal: Negative for back pain, joint swelling and myalgias. Skin: Negative for color change, rash and wound. Neurological: Negative for dizziness, tremors and numbness. Hematological: Negative for adenopathy. Does not bruise/bleed easily.

## 2019-05-21 NOTE — PROGRESS NOTES
[facility]  [facility] UROLOGY SPECIALISTS - [place]  [address]  [address]  [address] [place] [id number]  Dept: [address] [facility] Urology Office Note - New Patient    Patient:  Timi Watkins  YOB: 1980  Date: 5/21/2019    The patient is a 41 y.o. female who presentstoday for evaluation of the following problems:   Chief Complaint   Patient presents with    New Patient     incontinence sneezing , cough , vomiting,  scheduled for a hyst 6/14/2019    Urinary Tract Infection     recurrent especially after intercourse     referred by No primary care provider on file. Mary Hoffmann HPI  Here for incontinence she noted after her thrid child. She notices this iliana sneezing, coughing or vomiting. She wears no pads. She has done kegels some with no improvement. She has UTI in the past, the last October- but cultures do not come back showing bacteria. She voids well, has no nocturia, empties well. Is scheduled for hyst 6/14/19- for fibroids and bleeding issues. (Patient's old records have been requested, reviewed and summarized in today's note.)    Summary of old records: N/A    Additional History: N/A    ProceduresToday: N/A    Urinalysis today:  No results found for this visit on 05/21/19.     AUA Symptom Score (5/21/2019):  INCOMPLETE EMPTYING: How often have you had the sensation of not emptying your bladder?: Not at all  FREQUENCY: How often do you have to urinate less than every two hours?: Not at all  INTERMITTENCY: How often have you found you stopped and started again several times when you urinated?: Not at all  URGENCY: How often have you found it difficult to postpone urination?: Not at all  WEAK STREAM: How often have you had a weak urinary stream?: Not at all  NOCTURIA: How many times did you typically get up at night to uriniate?: NONE  TOTAL I-PSS SCORE[de-identified] 0  How would you feel if you were to spend the rest of your life with your urinary condition?: Terrible    Last BUN andcreatinine:  Lab Results   Component Value Date    BUN 11 2016     Lab Results   Component Value Date    CREATININE 0.59 2016       Additional Lab/Culture results:     Imaging Reviewed during this Office Visit:   (results were independently reviewed byphysician and radiology report verified)    PAST MEDICAL, FAMILY AND SOCIAL HISTORY:     Past Medical History:   Diagnosis Date    Abnormal pap     \" thought it was due to yeast infection\"    Allergic     heparin    Anemia     briefly RT bleeding    Asthma     Chronic kidney disease     frequent UTI during past pregnancy    Gestational HTN 2016    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     Past Surgical History:   Procedure Laterality Date   [address]    laparoscopy--also 2 left one right ovarian cystectomies    DILATION AND CURETTAGE OF UTERUS  14    DILATION AND CURETTAGE OF UTERUS  2017    LAPAROSCOPY  2014    exploratory laparoscopy    LASIK Bilateral 2013    [place] INDUCED ABORTN BY DIL/EVAC N/A 2017    DILATATION AND CURETTAGE SUCTION performed by Layo Strong MD at [address]  2000     Family History   Problem Relation Age of Onset    Diabetes Mother     High Blood Pressure Father     Cancer Paternal Uncle 46        throat/ smoked and drank    Clotting Disorder Maternal Grandmother         blood clots    Diabetes Maternal Grandmother     Heart Attack Maternal Grandfather 59         MI    Alzheimer's Disease Paternal Grandmother     Macular Degen Paternal Grandfather     Heart Attack Paternal Grandfather 76        MI     Outpatient Medications Marked as Taking for the 19 encounter (Office Visit) with Kade Burleson MD   Medication Sig Dispense Refill    Mirabegron ER (MYRBETRIQ) 50 MG TB24 Take 50 mg by mouth daily 28 tablet 0    ibuprofen (ADVIL;MOTRIN) 200 MG tablet Take 200 mg by mouth every 6 hours as needed for Pain      acetaminophen (TYLENOL) 325 MG tablet Take 650 mg by mouth every 6 hours as needed for Pain         Heparin and Codeine  Social History     Tobacco Use   Smoking Status Never Smoker   Smokeless Tobacco Never Used      (If patient a smoker, smoking cessation counseling offered)   Social History     Substance and Sexual Activity   Alcohol Use Yes    Alcohol/week: 0.0 oz    Comment: rarely       REVIEW OF SYSTEMS:  Review of Systems    Physical Exam:    This a 41 y.o. female      Vitals:    05/21/19 1500   BP: (!) 144/97   Pulse: 77   Temp: 98.8 °F (37.1 °C)     Body mass index is 32.1 kg/m². Physical Exam  Constitutional: Patient in no acute distress, ggod grooming, appropriately dressed  Neuro: Alert and oriented to person, place and time. Psych:Mood normal, affect normal  Skin: warm, pink, No rash noted  HEENT: Head: Normocephalic and atraumatic,  Conjunctivae and EOM are normal,Nose- normal, Right/Left External Ear: normal, Mouth: Mucosa Moist  Neck: Supple  Lungs: Respiratory effort is normal  Cardiovascular: strong and regular, no lower leg edema  Abdomen: Soft, non-tender, non-distended  Lymphatics: No cervical palpable lymphadenopathy  Bladder non-tender and not distended. Musculoskeletal: Normal gait and station        Assessment and Plan      1. Stress incontinence in female            Plan:    will start Myrbetriq sample. Follow up 6-8 after hyst for Urodynamic testing if symptoms do not resolve. If symptoms resolve follow up for routine appt. timed and double void every  2-3 hours     niharika 10-12 reps, 4x per day    Call with any questions or concerns    Prescriptions Ordered:  Orders Placed This Encounter   Medications    Mirabegron ER (MYRBETRIQ) 50 MG TB24     Sig: Take 50 mg by mouth daily     Dispense:  28 tablet     Refill:  0      Orders Placed:  No orders of the defined types were placed in this encounter.            Gracie Abraham MD    Review and Agree with the ROS entered by the

## 2019-05-24 ENCOUNTER — HOSPITAL ENCOUNTER (OUTPATIENT)
Age: 39
Setting detail: SPECIMEN
Discharge: HOME | End: 2019-05-24
Payer: COMMERCIAL

## 2019-05-24 DIAGNOSIS — N93.9 ABNORMAL UTERINE BLEEDING (AUB): ICD-10-CM

## 2019-05-24 LAB
ESTRADIOL LEVEL: 60 PG/ML (ref 27–314)
FERRITIN: 8 UG/L (ref 13–150)
FOLLICLE STIMULATING HORMONE: 6.2 U/L (ref 1.7–21.5)
HCT VFR BLD CALC: 39.6 % (ref 36.3–47.1)
HEMOGLOBIN: 11.5 G/DL (ref 11.9–15.1)
IRON SATURATION: 4 % (ref 20–55)
IRON: 15 UG/DL (ref 37–145)
MCH RBC QN AUTO: 22.5 PG (ref 25.2–33.5)
MCHC RBC AUTO-ENTMCNC: 29 G/DL (ref 28.4–34.8)
MCV RBC AUTO: 77.5 FL (ref 82.6–102.9)
NRBC AUTOMATED: 0 PER 100 WBC
PDW BLD-RTO: 16.5 % (ref 11.8–14.4)
PLATELET # BLD: 493 K/UL (ref 138–453)
PMV BLD AUTO: 10.9 FL (ref 8.1–13.5)
RBC # BLD: 5.11 M/UL (ref 3.95–5.11)
TOTAL IRON BINDING CAPACITY: 334 UG/DL (ref 250–450)
TSH SERPL DL<=0.05 MIU/L-ACNC: 1.3 MIU/L (ref 0.3–5)
UNSATURATED IRON BINDING CAPACITY: 319 UG/DL (ref 112–347)
WBC # BLD: 11 K/UL (ref 3.5–11.3)

## 2019-06-11 ENCOUNTER — HOSPITAL ENCOUNTER (OUTPATIENT)
Dept: PREADMISSION TESTING | Age: 39
Setting detail: OUTPATIENT SURGERY
Discharge: HOME | End: 2019-06-15
Payer: COMMERCIAL

## 2019-06-11 VITALS
OXYGEN SATURATION: 100 % | HEIGHT: 69 IN | TEMPERATURE: 99 F | DIASTOLIC BLOOD PRESSURE: 88 MMHG | BODY MASS INDEX: 32.33 KG/M2 | RESPIRATION RATE: 18 BRPM | HEART RATE: 70 BPM | SYSTOLIC BLOOD PRESSURE: 129 MMHG | WEIGHT: 218.26 LBS

## 2019-06-11 LAB
ABO/RH: NORMAL
ANTIBODY SCREEN: NEGATIVE
ARM BAND NUMBER: NORMAL
EXPIRATION DATE: NORMAL
HCG(URINE) PREGNANCY TEST: NEGATIVE
HCT VFR BLD CALC: 38.6 % (ref 36.3–47.1)
HEMOGLOBIN: 11.5 G/DL (ref 11.9–15.1)
MCH RBC QN AUTO: 22.7 PG (ref 25.2–33.5)
MCHC RBC AUTO-ENTMCNC: 29.8 G/DL (ref 28.4–34.8)
MCV RBC AUTO: 76.3 FL (ref 82.6–102.9)
NRBC AUTOMATED: 0 PER 100 WBC
PDW BLD-RTO: 16.6 % (ref 11.8–14.4)
PLATELET # BLD: 431 K/UL (ref 138–453)
PMV BLD AUTO: 10.1 FL (ref 8.1–13.5)
RBC # BLD: 5.06 M/UL (ref 3.95–5.11)
WBC # BLD: 10.5 K/UL (ref 3.5–11.3)

## 2019-06-11 PROCEDURE — 86850 RBC ANTIBODY SCREEN: CPT

## 2019-06-11 PROCEDURE — 85027 COMPLETE CBC AUTOMATED: CPT

## 2019-06-11 PROCEDURE — 86901 BLOOD TYPING SEROLOGIC RH(D): CPT

## 2019-06-11 PROCEDURE — 81025 URINE PREGNANCY TEST: CPT

## 2019-06-11 PROCEDURE — 86900 BLOOD TYPING SEROLOGIC ABO: CPT

## 2019-06-11 RX ORDER — SODIUM CHLORIDE, SODIUM LACTATE, POTASSIUM CHLORIDE, CALCIUM CHLORIDE 600; 310; 30; 20 MG/100ML; MG/100ML; MG/100ML; MG/100ML
1000 INJECTION, SOLUTION INTRAVENOUS CONTINUOUS
Status: CANCELLED | OUTPATIENT
Start: 2019-06-11

## 2019-06-11 NOTE — PROGRESS NOTES
Anesthesia Focused Assessment    STOP-BANG Sleep Apnea Questionnaire    SNORE loudly (heard through closed doors)? No  TIRED, fatigued, sleepy during daytime? No  OBSERVED stopping breathing during sleep? No  High blood PRESSURE being treated? No    BMI over 35? No  AGE over 48? No  NECK circumference over 16\"? No  GENDER (male)? No             Total 0  High risk 5-8  Intermediate risk 3-4  Low risk 0-2    Obstructive Sleep Apnea: no  If YES, machine used: no     Type 1 DM:   no  T2DM:  no    Coronary Artery Disease:  no  Hypertension:  no    Active smoker:  no  Drinks Alcohol:  weekly    Dentition: molar crown x 1    Defib / AICD / Pacemaker: no      Renal Failure/dialysis:  no    Patient was evaluated in PAT & anesthesia guidelines were applied. NPO guidelines, medication instructions and scheduled arrival time were reviewed with patient. Hx of anesthesia complications:  no  Family hx of anesthesia complications:  no                                                                                                                     Anesthesia contacted:   no  Medical or cardiac clearance ordered: no    DIFFICULT IV ACCESS, WILL NEED PICC TEAM OR ANESTHESIA TO START THE IV.     Juaquin Jiménez PA-C  6/11/19  2:09 PM

## 2019-06-14 ENCOUNTER — ANESTHESIA (OUTPATIENT)
Dept: OPERATING ROOM | Age: 39
End: 2019-06-14
Payer: COMMERCIAL

## 2019-06-14 ENCOUNTER — ANESTHESIA EVENT (OUTPATIENT)
Dept: OPERATING ROOM | Age: 39
End: 2019-06-14
Payer: COMMERCIAL

## 2019-06-14 ENCOUNTER — HOSPITAL ENCOUNTER (OUTPATIENT)
Age: 39
Setting detail: OUTPATIENT SURGERY
Discharge: HOME | End: 2019-06-14
Attending: OBSTETRICS & GYNECOLOGY | Admitting: OBSTETRICS & GYNECOLOGY
Payer: COMMERCIAL

## 2019-06-14 VITALS — TEMPERATURE: 98.1 F | SYSTOLIC BLOOD PRESSURE: 127 MMHG | DIASTOLIC BLOOD PRESSURE: 97 MMHG | OXYGEN SATURATION: 100 %

## 2019-06-14 VITALS
SYSTOLIC BLOOD PRESSURE: 140 MMHG | HEART RATE: 69 BPM | TEMPERATURE: 96.6 F | WEIGHT: 218.26 LBS | DIASTOLIC BLOOD PRESSURE: 73 MMHG | OXYGEN SATURATION: 97 % | HEIGHT: 69 IN | BODY MASS INDEX: 32.33 KG/M2 | RESPIRATION RATE: 15 BRPM

## 2019-06-14 DIAGNOSIS — N93.9 ABNORMAL UTERINE BLEEDING (AUB): ICD-10-CM

## 2019-06-14 DIAGNOSIS — Z90.710 S/P HYSTERECTOMY: ICD-10-CM

## 2019-06-14 DIAGNOSIS — R10.2 PELVIC PAIN: Primary | ICD-10-CM

## 2019-06-14 DIAGNOSIS — Z98.890 S/P LAPAROSCOPY: ICD-10-CM

## 2019-06-14 PROCEDURE — 2580000003 HC RX 258: Performed by: ANESTHESIOLOGY

## 2019-06-14 PROCEDURE — 3700000001 HC ADD 15 MINUTES (ANESTHESIA): Performed by: OBSTETRICS & GYNECOLOGY

## 2019-06-14 PROCEDURE — 6360000002 HC RX W HCPCS: Performed by: ANESTHESIOLOGY

## 2019-06-14 PROCEDURE — 58571 TLH W/T/O 250 G OR LESS: CPT | Performed by: OBSTETRICS & GYNECOLOGY

## 2019-06-14 PROCEDURE — 7100000001 HC PACU RECOVERY - ADDTL 15 MIN: Performed by: OBSTETRICS & GYNECOLOGY

## 2019-06-14 PROCEDURE — 2780000010 HC IMPLANT OTHER: Performed by: OBSTETRICS & GYNECOLOGY

## 2019-06-14 PROCEDURE — 2500000003 HC RX 250 WO HCPCS: Performed by: NURSE ANESTHETIST, CERTIFIED REGISTERED

## 2019-06-14 PROCEDURE — 3700000000 HC ANESTHESIA ATTENDED CARE: Performed by: OBSTETRICS & GYNECOLOGY

## 2019-06-14 PROCEDURE — 7100000011 HC PHASE II RECOVERY - ADDTL 15 MIN: Performed by: OBSTETRICS & GYNECOLOGY

## 2019-06-14 PROCEDURE — 88307 TISSUE EXAM BY PATHOLOGIST: CPT

## 2019-06-14 PROCEDURE — 2580000003 HC RX 258: Performed by: NURSE ANESTHETIST, CERTIFIED REGISTERED

## 2019-06-14 PROCEDURE — 76937 US GUIDE VASCULAR ACCESS: CPT

## 2019-06-14 PROCEDURE — 2709999900 HC NON-CHARGEABLE SUPPLY: Performed by: OBSTETRICS & GYNECOLOGY

## 2019-06-14 PROCEDURE — 6360000002 HC RX W HCPCS: Performed by: NURSE ANESTHETIST, CERTIFIED REGISTERED

## 2019-06-14 PROCEDURE — 6360000002 HC RX W HCPCS: Performed by: OBSTETRICS & GYNECOLOGY

## 2019-06-14 PROCEDURE — 2500000003 HC RX 250 WO HCPCS: Performed by: ANESTHESIOLOGY

## 2019-06-14 PROCEDURE — S2900 ROBOTIC SURGICAL SYSTEM: HCPCS | Performed by: OBSTETRICS & GYNECOLOGY

## 2019-06-14 PROCEDURE — 81025 URINE PREGNANCY TEST: CPT

## 2019-06-14 PROCEDURE — 6360000002 HC RX W HCPCS

## 2019-06-14 PROCEDURE — 52000 CYSTOURETHROSCOPY: CPT | Performed by: OBSTETRICS & GYNECOLOGY

## 2019-06-14 PROCEDURE — 3600000019 HC SURGERY ROBOT ADDTL 15MIN: Performed by: OBSTETRICS & GYNECOLOGY

## 2019-06-14 PROCEDURE — 3600000009 HC SURGERY ROBOT BASE: Performed by: OBSTETRICS & GYNECOLOGY

## 2019-06-14 PROCEDURE — 64488 TAP BLOCK BI INJECTION: CPT | Performed by: ANESTHESIOLOGY

## 2019-06-14 PROCEDURE — 2580000003 HC RX 258: Performed by: OBSTETRICS & GYNECOLOGY

## 2019-06-14 PROCEDURE — 6370000000 HC RX 637 (ALT 250 FOR IP): Performed by: ANESTHESIOLOGY

## 2019-06-14 PROCEDURE — 7100000010 HC PHASE II RECOVERY - FIRST 15 MIN: Performed by: OBSTETRICS & GYNECOLOGY

## 2019-06-14 PROCEDURE — 6370000000 HC RX 637 (ALT 250 FOR IP): Performed by: OBSTETRICS & GYNECOLOGY

## 2019-06-14 PROCEDURE — 7100000000 HC PACU RECOVERY - FIRST 15 MIN: Performed by: OBSTETRICS & GYNECOLOGY

## 2019-06-14 RX ORDER — PHENAZOPYRIDINE HYDROCHLORIDE 200 MG/1
200 TABLET, FILM COATED ORAL 3 TIMES DAILY PRN
Qty: 10 TABLET | Refills: 0 | Status: SHIPPED | OUTPATIENT
Start: 2019-06-14 | End: 2019-06-17

## 2019-06-14 RX ORDER — ONDANSETRON 2 MG/ML
4 INJECTION INTRAMUSCULAR; INTRAVENOUS
Status: DISCONTINUED | OUTPATIENT
Start: 2019-06-14 | End: 2019-06-14 | Stop reason: HOSPADM

## 2019-06-14 RX ORDER — PROPOFOL 10 MG/ML
INJECTION, EMULSION INTRAVENOUS PRN
Status: DISCONTINUED | OUTPATIENT
Start: 2019-06-14 | End: 2019-06-14 | Stop reason: SDUPTHER

## 2019-06-14 RX ORDER — MAGNESIUM HYDROXIDE 1200 MG/15ML
LIQUID ORAL CONTINUOUS PRN
Status: COMPLETED | OUTPATIENT
Start: 2019-06-14 | End: 2019-06-14

## 2019-06-14 RX ORDER — DIPHENHYDRAMINE HYDROCHLORIDE 50 MG/ML
12.5 INJECTION INTRAMUSCULAR; INTRAVENOUS
Status: DISCONTINUED | OUTPATIENT
Start: 2019-06-14 | End: 2019-06-14 | Stop reason: HOSPADM

## 2019-06-14 RX ORDER — OXYCODONE HYDROCHLORIDE AND ACETAMINOPHEN 5; 325 MG/1; MG/1
2 TABLET ORAL PRN
Status: COMPLETED | OUTPATIENT
Start: 2019-06-14 | End: 2019-06-14

## 2019-06-14 RX ORDER — LIDOCAINE HYDROCHLORIDE 10 MG/ML
1 INJECTION, SOLUTION EPIDURAL; INFILTRATION; INTRACAUDAL; PERINEURAL
Status: DISCONTINUED | OUTPATIENT
Start: 2019-06-14 | End: 2019-06-14 | Stop reason: HOSPADM

## 2019-06-14 RX ORDER — MEPERIDINE HYDROCHLORIDE 50 MG/ML
12.5 INJECTION INTRAMUSCULAR; INTRAVENOUS; SUBCUTANEOUS EVERY 5 MIN PRN
Status: DISCONTINUED | OUTPATIENT
Start: 2019-06-14 | End: 2019-06-14 | Stop reason: HOSPADM

## 2019-06-14 RX ORDER — LABETALOL HYDROCHLORIDE 5 MG/ML
5 INJECTION, SOLUTION INTRAVENOUS EVERY 10 MIN PRN
Status: DISCONTINUED | OUTPATIENT
Start: 2019-06-14 | End: 2019-06-14 | Stop reason: HOSPADM

## 2019-06-14 RX ORDER — DEXAMETHASONE SODIUM PHOSPHATE 10 MG/ML
INJECTION INTRAMUSCULAR; INTRAVENOUS PRN
Status: DISCONTINUED | OUTPATIENT
Start: 2019-06-14 | End: 2019-06-14 | Stop reason: SDUPTHER

## 2019-06-14 RX ORDER — MORPHINE SULFATE 10 MG/ML
INJECTION, SOLUTION INTRAMUSCULAR; INTRAVENOUS PRN
Status: DISCONTINUED | OUTPATIENT
Start: 2019-06-14 | End: 2019-06-14 | Stop reason: SDUPTHER

## 2019-06-14 RX ORDER — FENTANYL CITRATE 50 UG/ML
50 INJECTION, SOLUTION INTRAMUSCULAR; INTRAVENOUS EVERY 5 MIN PRN
Status: DISCONTINUED | OUTPATIENT
Start: 2019-06-14 | End: 2019-06-14 | Stop reason: HOSPADM

## 2019-06-14 RX ORDER — FENTANYL CITRATE 50 UG/ML
25 INJECTION, SOLUTION INTRAMUSCULAR; INTRAVENOUS EVERY 5 MIN PRN
Status: DISCONTINUED | OUTPATIENT
Start: 2019-06-14 | End: 2019-06-14 | Stop reason: HOSPADM

## 2019-06-14 RX ORDER — FENTANYL CITRATE 50 UG/ML
INJECTION, SOLUTION INTRAMUSCULAR; INTRAVENOUS PRN
Status: DISCONTINUED | OUTPATIENT
Start: 2019-06-14 | End: 2019-06-14 | Stop reason: SDUPTHER

## 2019-06-14 RX ORDER — HYDROCODONE BITARTRATE AND ACETAMINOPHEN 5; 325 MG/1; MG/1
1 TABLET ORAL EVERY 4 HOURS PRN
Qty: 28 TABLET | Refills: 0 | Status: SHIPPED | OUTPATIENT
Start: 2019-06-14 | End: 2019-06-21

## 2019-06-14 RX ORDER — ONDANSETRON 2 MG/ML
INJECTION INTRAMUSCULAR; INTRAVENOUS PRN
Status: DISCONTINUED | OUTPATIENT
Start: 2019-06-14 | End: 2019-06-14 | Stop reason: SDUPTHER

## 2019-06-14 RX ORDER — FENTANYL CITRATE 50 UG/ML
INJECTION, SOLUTION INTRAMUSCULAR; INTRAVENOUS
Status: COMPLETED
Start: 2019-06-14 | End: 2019-06-14

## 2019-06-14 RX ORDER — GLYCOPYRROLATE 1 MG/5 ML
SYRINGE (ML) INTRAVENOUS PRN
Status: DISCONTINUED | OUTPATIENT
Start: 2019-06-14 | End: 2019-06-14 | Stop reason: SDUPTHER

## 2019-06-14 RX ORDER — MIDAZOLAM HYDROCHLORIDE 1 MG/ML
INJECTION INTRAMUSCULAR; INTRAVENOUS PRN
Status: DISCONTINUED | OUTPATIENT
Start: 2019-06-14 | End: 2019-06-14 | Stop reason: SDUPTHER

## 2019-06-14 RX ORDER — MIDAZOLAM HYDROCHLORIDE 1 MG/ML
INJECTION INTRAMUSCULAR; INTRAVENOUS
Status: COMPLETED
Start: 2019-06-14 | End: 2019-06-14

## 2019-06-14 RX ORDER — LIDOCAINE HYDROCHLORIDE 10 MG/ML
INJECTION, SOLUTION EPIDURAL; INFILTRATION; INTRACAUDAL; PERINEURAL PRN
Status: DISCONTINUED | OUTPATIENT
Start: 2019-06-14 | End: 2019-06-14 | Stop reason: SDUPTHER

## 2019-06-14 RX ORDER — SODIUM CHLORIDE, SODIUM LACTATE, POTASSIUM CHLORIDE, CALCIUM CHLORIDE 600; 310; 30; 20 MG/100ML; MG/100ML; MG/100ML; MG/100ML
INJECTION, SOLUTION INTRAVENOUS CONTINUOUS
Status: DISCONTINUED | OUTPATIENT
Start: 2019-06-14 | End: 2019-06-14 | Stop reason: HOSPADM

## 2019-06-14 RX ORDER — SODIUM CHLORIDE, SODIUM LACTATE, POTASSIUM CHLORIDE, CALCIUM CHLORIDE 600; 310; 30; 20 MG/100ML; MG/100ML; MG/100ML; MG/100ML
INJECTION, SOLUTION INTRAVENOUS CONTINUOUS PRN
Status: DISCONTINUED | OUTPATIENT
Start: 2019-06-14 | End: 2019-06-14 | Stop reason: SDUPTHER

## 2019-06-14 RX ORDER — MORPHINE SULFATE 2 MG/ML
2 INJECTION, SOLUTION INTRAMUSCULAR; INTRAVENOUS EVERY 5 MIN PRN
Status: DISCONTINUED | OUTPATIENT
Start: 2019-06-14 | End: 2019-06-14 | Stop reason: HOSPADM

## 2019-06-14 RX ORDER — OXYCODONE HYDROCHLORIDE AND ACETAMINOPHEN 5; 325 MG/1; MG/1
1 TABLET ORAL PRN
Status: COMPLETED | OUTPATIENT
Start: 2019-06-14 | End: 2019-06-14

## 2019-06-14 RX ORDER — DOCUSATE SODIUM 100 MG/1
100 CAPSULE, LIQUID FILLED ORAL DAILY PRN
Qty: 60 CAPSULE | Refills: 0 | Status: SHIPPED | OUTPATIENT
Start: 2019-06-14 | End: 2019-07-25 | Stop reason: ALTCHOICE

## 2019-06-14 RX ORDER — NEOSTIGMINE METHYLSULFATE 5 MG/5 ML
SYRINGE (ML) INTRAVENOUS PRN
Status: DISCONTINUED | OUTPATIENT
Start: 2019-06-14 | End: 2019-06-14 | Stop reason: SDUPTHER

## 2019-06-14 RX ORDER — PHENAZOPYRIDINE HYDROCHLORIDE 100 MG/1
200 TABLET, FILM COATED ORAL ONCE
Status: COMPLETED | OUTPATIENT
Start: 2019-06-14 | End: 2019-06-14

## 2019-06-14 RX ORDER — PHENAZOPYRIDINE HYDROCHLORIDE 200 MG/1
200 TABLET, FILM COATED ORAL 3 TIMES DAILY PRN
Qty: 9 TABLET | Refills: 0 | Status: SHIPPED | OUTPATIENT
Start: 2019-06-14 | End: 2019-06-17

## 2019-06-14 RX ORDER — HYDRALAZINE HYDROCHLORIDE 20 MG/ML
5 INJECTION INTRAMUSCULAR; INTRAVENOUS EVERY 10 MIN PRN
Status: DISCONTINUED | OUTPATIENT
Start: 2019-06-14 | End: 2019-06-14 | Stop reason: HOSPADM

## 2019-06-14 RX ORDER — IBUPROFEN 600 MG/1
600 TABLET ORAL 4 TIMES DAILY PRN
Qty: 120 TABLET | Refills: 0 | Status: SHIPPED | OUTPATIENT
Start: 2019-06-14 | End: 2019-07-01 | Stop reason: ALTCHOICE

## 2019-06-14 RX ORDER — ROCURONIUM BROMIDE 10 MG/ML
INJECTION, SOLUTION INTRAVENOUS PRN
Status: DISCONTINUED | OUTPATIENT
Start: 2019-06-14 | End: 2019-06-14 | Stop reason: SDUPTHER

## 2019-06-14 RX ORDER — SODIUM CHLORIDE, SODIUM LACTATE, POTASSIUM CHLORIDE, CALCIUM CHLORIDE 600; 310; 30; 20 MG/100ML; MG/100ML; MG/100ML; MG/100ML
1000 INJECTION, SOLUTION INTRAVENOUS CONTINUOUS
Status: DISCONTINUED | OUTPATIENT
Start: 2019-06-14 | End: 2019-06-14 | Stop reason: HOSPADM

## 2019-06-14 RX ORDER — MIDAZOLAM HYDROCHLORIDE 1 MG/ML
0.5 INJECTION INTRAMUSCULAR; INTRAVENOUS 4 TIMES DAILY PRN
Status: DISCONTINUED | OUTPATIENT
Start: 2019-06-14 | End: 2019-06-14 | Stop reason: HOSPADM

## 2019-06-14 RX ORDER — PHENAZOPYRIDINE HYDROCHLORIDE 100 MG/1
100 TABLET, FILM COATED ORAL ONCE
Status: COMPLETED | OUTPATIENT
Start: 2019-06-14 | End: 2019-06-14

## 2019-06-14 RX ADMIN — MIDAZOLAM HYDROCHLORIDE 1 MG: 1 INJECTION, SOLUTION INTRAMUSCULAR; INTRAVENOUS at 12:18

## 2019-06-14 RX ADMIN — SODIUM CHLORIDE, POTASSIUM CHLORIDE, SODIUM LACTATE AND CALCIUM CHLORIDE 1000 ML: 600; 310; 30; 20 INJECTION, SOLUTION INTRAVENOUS at 12:17

## 2019-06-14 RX ADMIN — MORPHINE SULFATE 5 MG: 10 INJECTION, SOLUTION INTRAMUSCULAR; INTRAVENOUS at 13:35

## 2019-06-14 RX ADMIN — SODIUM CHLORIDE, POTASSIUM CHLORIDE, SODIUM LACTATE AND CALCIUM CHLORIDE: 600; 310; 30; 20 INJECTION, SOLUTION INTRAVENOUS at 12:42

## 2019-06-14 RX ADMIN — MORPHINE SULFATE 5 MG: 10 INJECTION, SOLUTION INTRAMUSCULAR; INTRAVENOUS at 13:32

## 2019-06-14 RX ADMIN — MIDAZOLAM HYDROCHLORIDE 1 MG: 1 INJECTION, SOLUTION INTRAMUSCULAR; INTRAVENOUS at 12:37

## 2019-06-14 RX ADMIN — PHENAZOPYRIDINE 100 MG: 100 TABLET ORAL at 19:02

## 2019-06-14 RX ADMIN — MIDAZOLAM HYDROCHLORIDE 2 MG: 1 INJECTION, SOLUTION INTRAMUSCULAR; INTRAVENOUS at 12:44

## 2019-06-14 RX ADMIN — Medication 2 G: at 12:54

## 2019-06-14 RX ADMIN — FENTANYL CITRATE 25 MCG: 50 INJECTION INTRAMUSCULAR; INTRAVENOUS at 16:07

## 2019-06-14 RX ADMIN — FENTANYL CITRATE 100 MCG: 50 INJECTION, SOLUTION INTRAMUSCULAR; INTRAVENOUS at 12:46

## 2019-06-14 RX ADMIN — SODIUM CHLORIDE, POTASSIUM CHLORIDE, SODIUM LACTATE AND CALCIUM CHLORIDE: 600; 310; 30; 20 INJECTION, SOLUTION INTRAVENOUS at 15:33

## 2019-06-14 RX ADMIN — ROCURONIUM BROMIDE 50 MG: 10 INJECTION INTRAVENOUS at 12:46

## 2019-06-14 RX ADMIN — Medication 3 MG: at 15:38

## 2019-06-14 RX ADMIN — OXYCODONE HYDROCHLORIDE AND ACETAMINOPHEN 1 TABLET: 5; 325 TABLET ORAL at 16:31

## 2019-06-14 RX ADMIN — ONDANSETRON 4 MG: 2 INJECTION, SOLUTION INTRAMUSCULAR; INTRAVENOUS at 15:15

## 2019-06-14 RX ADMIN — FENTANYL CITRATE 25 MCG: 50 INJECTION INTRAMUSCULAR; INTRAVENOUS at 16:36

## 2019-06-14 RX ADMIN — LIDOCAINE HYDROCHLORIDE 50 MG: 10 INJECTION, SOLUTION EPIDURAL; INFILTRATION; INTRACAUDAL; PERINEURAL at 12:46

## 2019-06-14 RX ADMIN — FENTANYL CITRATE 50 MCG: 50 INJECTION, SOLUTION INTRAMUSCULAR; INTRAVENOUS at 14:44

## 2019-06-14 RX ADMIN — Medication 0.6 MG: at 15:38

## 2019-06-14 RX ADMIN — FENTANYL CITRATE 50 MCG: 50 INJECTION, SOLUTION INTRAMUSCULAR; INTRAVENOUS at 13:19

## 2019-06-14 RX ADMIN — PROPOFOL 200 MG: 10 INJECTION, EMULSION INTRAVENOUS at 12:46

## 2019-06-14 RX ADMIN — FENTANYL CITRATE 50 MCG: 50 INJECTION, SOLUTION INTRAMUSCULAR; INTRAVENOUS at 13:15

## 2019-06-14 RX ADMIN — PHENAZOPYRIDINE HYDROCHLORIDE 100 MG: 100 TABLET ORAL at 18:45

## 2019-06-14 RX ADMIN — FENTANYL CITRATE 50 MCG: 50 INJECTION INTRAMUSCULAR; INTRAVENOUS at 15:57

## 2019-06-14 RX ADMIN — FENTANYL CITRATE 25 MCG: 50 INJECTION INTRAMUSCULAR; INTRAVENOUS at 16:12

## 2019-06-14 RX ADMIN — DEXAMETHASONE SODIUM PHOSPHATE 10 MG: 10 INJECTION INTRAMUSCULAR; INTRAVENOUS at 13:04

## 2019-06-14 RX ADMIN — FENTANYL CITRATE 25 MCG: 50 INJECTION INTRAMUSCULAR; INTRAVENOUS at 17:21

## 2019-06-14 RX ADMIN — BUPIVACAINE HYDROCHLORIDE 40 ML: 5 INJECTION, SOLUTION EPIDURAL; INTRACAUDAL; PERINEURAL at 15:30

## 2019-06-14 ASSESSMENT — PULMONARY FUNCTION TESTS
PIF_VALUE: 23
PIF_VALUE: 27
PIF_VALUE: 28
PIF_VALUE: 27
PIF_VALUE: 28
PIF_VALUE: 27
PIF_VALUE: 27
PIF_VALUE: 26
PIF_VALUE: 26
PIF_VALUE: 15
PIF_VALUE: 17
PIF_VALUE: 27
PIF_VALUE: 26
PIF_VALUE: 15
PIF_VALUE: 18
PIF_VALUE: 28
PIF_VALUE: 17
PIF_VALUE: 28
PIF_VALUE: 27
PIF_VALUE: 17
PIF_VALUE: 5
PIF_VALUE: 27
PIF_VALUE: 28
PIF_VALUE: 16
PIF_VALUE: 23
PIF_VALUE: 28
PIF_VALUE: 28
PIF_VALUE: 17
PIF_VALUE: 27
PIF_VALUE: 2
PIF_VALUE: 27
PIF_VALUE: 1
PIF_VALUE: 18
PIF_VALUE: 28
PIF_VALUE: 25
PIF_VALUE: 27
PIF_VALUE: 28
PIF_VALUE: 27
PIF_VALUE: 28
PIF_VALUE: 27
PIF_VALUE: 3
PIF_VALUE: 17
PIF_VALUE: 15
PIF_VALUE: 27
PIF_VALUE: 1
PIF_VALUE: 16
PIF_VALUE: 16
PIF_VALUE: 28
PIF_VALUE: 16
PIF_VALUE: 28
PIF_VALUE: 21
PIF_VALUE: 28
PIF_VALUE: 19
PIF_VALUE: 16
PIF_VALUE: 28
PIF_VALUE: 28
PIF_VALUE: 27
PIF_VALUE: 15
PIF_VALUE: 26
PIF_VALUE: 9
PIF_VALUE: 16
PIF_VALUE: 28
PIF_VALUE: 28
PIF_VALUE: 29
PIF_VALUE: 28
PIF_VALUE: 27
PIF_VALUE: 27
PIF_VALUE: 29
PIF_VALUE: 27
PIF_VALUE: 28
PIF_VALUE: 10
PIF_VALUE: 25
PIF_VALUE: 27
PIF_VALUE: 15
PIF_VALUE: 16
PIF_VALUE: 28
PIF_VALUE: 27
PIF_VALUE: 16
PIF_VALUE: 28
PIF_VALUE: 29
PIF_VALUE: 28
PIF_VALUE: 26
PIF_VALUE: 15
PIF_VALUE: 27
PIF_VALUE: 15
PIF_VALUE: 28
PIF_VALUE: 3
PIF_VALUE: 27
PIF_VALUE: 15
PIF_VALUE: 27
PIF_VALUE: 27
PIF_VALUE: 17
PIF_VALUE: 26
PIF_VALUE: 26
PIF_VALUE: 29
PIF_VALUE: 18
PIF_VALUE: 27
PIF_VALUE: 17
PIF_VALUE: 26
PIF_VALUE: 28
PIF_VALUE: 27
PIF_VALUE: 15
PIF_VALUE: 27
PIF_VALUE: 28
PIF_VALUE: 28
PIF_VALUE: 17
PIF_VALUE: 27
PIF_VALUE: 25
PIF_VALUE: 27
PIF_VALUE: 29
PIF_VALUE: 14
PIF_VALUE: 27
PIF_VALUE: 27
PIF_VALUE: 28
PIF_VALUE: 0
PIF_VALUE: 23
PIF_VALUE: 17
PIF_VALUE: 29
PIF_VALUE: 28
PIF_VALUE: 28
PIF_VALUE: 1
PIF_VALUE: 16
PIF_VALUE: 28
PIF_VALUE: 27
PIF_VALUE: 17
PIF_VALUE: 16
PIF_VALUE: 1
PIF_VALUE: 27
PIF_VALUE: 18
PIF_VALUE: 14
PIF_VALUE: 16
PIF_VALUE: 16
PIF_VALUE: 28
PIF_VALUE: 15
PIF_VALUE: 27
PIF_VALUE: 17
PIF_VALUE: 15
PIF_VALUE: 17
PIF_VALUE: 27
PIF_VALUE: 27
PIF_VALUE: 28
PIF_VALUE: 21
PIF_VALUE: 27
PIF_VALUE: 14
PIF_VALUE: 24
PIF_VALUE: 26
PIF_VALUE: 27
PIF_VALUE: 28
PIF_VALUE: 18
PIF_VALUE: 15
PIF_VALUE: 28
PIF_VALUE: 28
PIF_VALUE: 29
PIF_VALUE: 27
PIF_VALUE: 16
PIF_VALUE: 18
PIF_VALUE: 27
PIF_VALUE: 28
PIF_VALUE: 17
PIF_VALUE: 19
PIF_VALUE: 27
PIF_VALUE: 2
PIF_VALUE: 26
PIF_VALUE: 15
PIF_VALUE: 15
PIF_VALUE: 18
PIF_VALUE: 28
PIF_VALUE: 28
PIF_VALUE: 27

## 2019-06-14 ASSESSMENT — PAIN SCALES - GENERAL
PAINLEVEL_OUTOF10: 7
PAINLEVEL_OUTOF10: 0
PAINLEVEL_OUTOF10: 6
PAINLEVEL_OUTOF10: 5
PAINLEVEL_OUTOF10: 4
PAINLEVEL_OUTOF10: 5
PAINLEVEL_OUTOF10: 4
PAINLEVEL_OUTOF10: 6
PAINLEVEL_OUTOF10: 4
PAINLEVEL_OUTOF10: 4
PAINLEVEL_OUTOF10: 0
PAINLEVEL_OUTOF10: 6
PAINLEVEL_OUTOF10: 6
PAINLEVEL_OUTOF10: 4
PAINLEVEL_OUTOF10: 3
PAINLEVEL_OUTOF10: 3

## 2019-06-14 ASSESSMENT — PAIN DESCRIPTION - FREQUENCY: FREQUENCY: CONTINUOUS

## 2019-06-14 ASSESSMENT — PAIN DESCRIPTION - ORIENTATION: ORIENTATION: INNER

## 2019-06-14 ASSESSMENT — PAIN DESCRIPTION - DESCRIPTORS: DESCRIPTORS: STABBING;ACHING

## 2019-06-14 ASSESSMENT — PAIN DESCRIPTION - LOCATION: LOCATION: ABDOMEN;VAGINA

## 2019-06-14 ASSESSMENT — PAIN - FUNCTIONAL ASSESSMENT: PAIN_FUNCTIONAL_ASSESSMENT: 0-10

## 2019-06-14 ASSESSMENT — PAIN DESCRIPTION - PAIN TYPE: TYPE: SURGICAL PAIN

## 2019-06-14 NOTE — ANESTHESIA PRE PROCEDURE
Department of Anesthesiology  Preprocedure Note       Name:  Modesto Harman   Age:  41 y.o.  :  1980                                          MRN:  [id number]         Date:  2019      Surgeon: Leighton Huntley):  Dee Guy MD    Procedure: XI LAPAROSCOPIC ROBOTIC ABDOMINAL HYSTERECTOMY, BSO, CYSTOSCOPY (N/A )    Medications prior to admission:   Prior to Admission medications    Medication Sig Start Date End Date Taking? Authorizing Provider   ibuprofen (ADVIL;MOTRIN) 200 MG tablet Take 200 mg by mouth every 6 hours as needed for Pain   Yes Historical Provider, MD   acetaminophen (TYLENOL) 325 MG tablet Take 650 mg by mouth every 6 hours as needed for Pain   Yes Historical Provider, MD       Current medications:    Current Facility-Administered Medications   Medication Dose Route Frequency Provider Last Rate Last Dose    ceFAZolin (ANCEF) 2 g in dextrose 5 % 50 mL IVPB  2 g Intravenous Once Dee Guy MD        lactated ringers infusion 1,000 mL  1,000 mL Intravenous Continuous Gloria Davenport MD        fentaNYL (SUBLIMAZE) 100 MCG/2ML injection             midazolam (VERSED) 2 MG/2ML injection             midazolam (VERSED) injection 0.5 mg  0.5 mg Intravenous 4x Daily PRN Anna Nguyen MD           Allergies: Allergies   Allergen Reactions    Heparin Swelling    Codeine Hives       Problem List:    Patient Active Problem List   Diagnosis Code    Asthma  O99.519, J45.909    Recurrent early pregnancy loss O26.20    Homozygous MTHFR mutation F2326P ([facility]) E72.12    Abnormal uterine bleeding (AUB) N93.9    Pelvic pain R10.2    Dysmenorrhea N94.6    Dyspareunia in female N94.10       Past Medical History:        Diagnosis Date    Abnormal pap     \" thought it was due to yeast infection\"    Allergic     heparin    Anemia     briefly RT bleeding    Asthma     Difficult intravenous access     May need PICC team for access.    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       Past Surgical History:        Procedure Laterality Date   [address]    laparoscopy--also 2 left one right ovarian cystectomies    CYST REMOVAL Bilateral 1996    two cyst on left one on right ovaries    DILATION AND CURETTAGE OF UTERUS  12/05/14    DILATION AND CURETTAGE OF UTERUS  07/23/2017    LAPAROSCOPY  12/05/2014    exploratory laparoscopy    LASIK Bilateral 09/2013    [place] INDUCED ABORTN BY DIL/EVAC N/A 7/23/2017    DILATATION AND CURETTAGE SUCTION performed by Alen Jeter MD at [address]  12/2000       Social History:    Social History     Tobacco Use    Smoking status: Never Smoker    Smokeless tobacco: Never Used   Substance Use Topics    Alcohol use:  Yes     Alcohol/week: 0.0 oz     Comment: rarely                                Counseling given: Not Answered      Vital Signs (Current):   Vitals:    06/14/19 1136 06/14/19 1139   BP:  (!) 143/72   Pulse:  87   Resp:  16   Temp:  99 °F (37.2 °C)   TempSrc:  Temporal   SpO2:  100%   Weight: 218 lb 4.1 oz (99 kg)    Height: 5' 9\" (1.753 m)                                               BP Readings from Last 3 Encounters:   06/14/19 (!) 143/72   06/11/19 129/88   05/21/19 (!) 144/97       NPO Status: Time of last liquid consumption: 1700                        Time of last solid consumption: 1700                        Date of last liquid consumption: 06/13/19                        Date of last solid food consumption: 06/13/19    BMI:   Wt Readings from Last 3 Encounters:   06/14/19 218 lb 4.1 oz (99 kg) 06/11/19 218 lb 4.1 oz (99 kg)   05/21/19 217 lb 6.4 oz (98.6 kg)     Body mass index is 32.23 kg/m². CBC:   Lab Results   Component Value Date    WBC 10.5 06/11/2019    RBC 5.06 06/11/2019    HGB 11.5 06/11/2019    HCT 38.6 06/11/2019    MCV 76.3 06/11/2019    RDW 16.6 06/11/2019     06/11/2019     12/08/2015       CMP:   Lab Results   Component Value Date     06/29/2016    K 4.1 06/29/2016     06/29/2016    CO2 20 06/29/2016    BUN 11 06/29/2016    CREATININE 0.59 06/29/2016    GFRAA >60 06/29/2016    LABGLOM >60 06/29/2016    GLUCOSE 79 06/29/2016    PROT 5.7 06/29/2016    CALCIUM 8.3 06/29/2016    BILITOT 0.15 06/29/2016    ALKPHOS 132 06/29/2016    AST 26 06/29/2016    ALT 34 06/29/2016       POC Tests: No results for input(s): POCGLU, POCNA, POCK, POCCL, POCBUN, POCHEMO, POCHCT in the last 72 hours. Coags:   Lab Results   Component Value Date    PROTIME 9.5 06/27/2016    INR 0.9 06/27/2016    APTT 21.0 06/27/2016       HCG (If Applicable):   Lab Results   Component Value Date    PREGTESTUR NEGATIVE 06/11/2019    HCGQUANT 174 (H) 07/23/2017        ABGs: No results found for: PHART, PO2ART, KLN9MPH, WSZ1ZTS, BEART, E0TGSZBK     Type & Screen (If Applicable):  No results found for: LABABO, LABRH    Anesthesia Evaluation    Airway: Mallampati: II  TM distance: >3 FB   Neck ROM: full  Mouth opening: > = 3 FB Dental:          Pulmonary:normal exam    (+) asthma:                            Cardiovascular:Negative CV ROS            Rhythm: regular  Rate: normal                    Neuro/Psych:   Negative Neuro/Psych ROS              GI/Hepatic/Renal: Neg GI/Hepatic/Renal ROS            Endo/Other: Negative Endo/Other ROS                    Abdominal:           Vascular: negative vascular ROS. Anesthesia Plan      general     ASA 1     (Discussed TAP block at the end of the case)  Induction: intravenous.     MIPS: Postoperative opioids intended and

## 2019-06-14 NOTE — OP NOTE
Gynecologic Operative Note      NAME: Cha Adam   MRN: [id number]  : 1980    PROCEDURE DATE:  19    Pre-op Diagnosis:  ENDOMETRIOSIS, ABNORMAL UTERINE BLEEDING     Post-op Diagnosis:  ENDOMETRIOSIS, ABNORMAL UTERINE BLEEDING * N    Procedure:  RTLH, BILATERAL SALPINGECTOMY, LEFT OVARIAN CYSTECTOMY, CYSTOSCOPY    Surgeon:  Dr. Te Lerner MD    Assistant:  None    Anesthesia Type: General    Complications: None    Estimated Blood Loss: 50 mL    Total IV Fluids:  1000 mL      Urine Output[de-identified]  150 mL clear urine    Findings:  Globular appearing uterus, normal right ovary with right paratubal cyst, normal left tube with left ovarian cyst, pelvic congestion with no obvious spots of endometriosis, patent left ureter, slow spill from right ureter initially, then strong jet from right ureter after a stitch was release from the vaginal cuff                  Specimens:  Uterus, cervix, bilateral fallopian tubes (with right paratubal cyst), cystoscopy    Condition: Stable    DESCRIPTION OF PROCEDURE:   The patient received preoperative antibiotics (2g Ancef) Then the patient was taken to the operating room where general endotracheal anesthesia was administered without any complications. The patient was prepped and draped in a [facility] normal [facility] fashion. Time out was undergone. A weighted speculum was placed in vagina and the anterior lip of the cervix was grasped with a single-tooth tenaculum. A stitch was placed at 12 o'clock of the cervix. The cervix was dilated slightly and the Medium VCare uterine manipulator was placed without any complications, and the tenaculum was removed as well as the weighed speculum. A Montes De Oca catheter was placed in the bladder. Gloves were then changed and attention was turned to the abdomen. Towel clips were used to tent up the umbilicus.  An 8-mm skin incision was made superior to the umbilicus and a Veress needle was tube was cauterized and excised from the tubo-ovarian ligament to the fimbriated end and removed from the abdomen. The utero-ovarian ligament was cauterized and ligated. Then the broad ligament was opened using bipolar and scissors then dissected down to the round ligament which was cauterized and ligated. The anterior leaf of the broad ligament was further dissected to the vesicouterine peritoneal reflection to complete the bladder flap. The bladder was further mobilized inferiorly and the anterior colpotomy cup could be palpated and visualized. The left uterine vessels were then skeletonized, cauterized and ligated at the cervical isthmus and a subsequent bite was taken, parallel to the cervix at the cardinal ligament. Then in a similar fashion, the right uterine arteries were skeletonized, cauterized and ligated at the cervical isthmus and a subsequent bite was taken, parallel to the cervix at the cardinal ligament. Posterior colpotomy was made circumferentially to the anterior aspect, amputating the cervix from the vagina without any complications. Then the uterus and cervix were removed en bloc vaginally without any complications. The vaginal cuff was then closed from right to left using 0 vicryl, being careful to incorporate both uterosacral ligaments in the corners and the vaginal mucosa into each suture, and oversewing suture to the midline. The pelvis was then irrigated/suctioned and excellent hemostasis was noted over the vaginal cuff, which was palpated vaginally after removing the glove with laparotomy sponge. Simultaneously, the cuff was also visualized laparoscopically and noted to be intact and well approximated. Augustine was then applied to the cuff. All pedicles were inspected with excellent hemostasis noted. There was no other intra-abdominal pathology.       A cystoscope was then inserted into the urethra and left ureteral jet was noted.  The right ureter was watched an noted to have a slow stream. I returned to the console and removed a stitch from the right angle of the vaginal cuff. This was then replaced. The cystoscope was reinserted and there was a strong jet noted from the right ureter at this point, after undocking the robot and bringing the patient out of Trendelenberg   A full inspection of the bladder showed no sutures in the bladder and no other evidence of injury. The cytoscope was then removed. Augustine was applied to the vaginal cuff. All instruments were removed from the abdomen under direct visualization. The daVinci was undocked and removed from the operative field. All CO2 was removed from the patient's abdomen and trocars were removed. Skin was closed using 4-0 monocryl in the subcuticular fashion and covered with Steris strips. Sponge, lap, needle count, and instrument counts were correct x 2. The patient was extubated and taken to the post operative recovery unit in good condition. She will receive a TAP block prior to leaving the operating room. The patient received postopertaive IV Toradol for further analgesia.     Britt Pozo MD  06/14/19  3:39 PM

## 2019-06-14 NOTE — H&P
History and Physical    Pt Name: Efe Gardiner  MRN: [id number]  YOB: 1980  Date of evaluation: 2019  Primary Care Physician: No primary care provider on file. SUBJECTIVE:   History of Chief Complaint:    Efe Gardiner is a 41 y.o. female who is scheduled today for laparoscopic robotic abdominal hysterectomy, BSO, Cystoscopy. She reports a longstanding history of AUB with heavy periods and inter-menstrual bleeding. She has significant dysmenorrhea as well. (I have completed a pre-operative H&P per anesthesia request for a TAP block). Allergies  is allergic to heparin and codeine. Medications  Prior to Admission medications    Medication Sig Start Date End Date Taking? Authorizing Provider   ibuprofen (ADVIL;MOTRIN) 200 MG tablet Take 200 mg by mouth every 6 hours as needed for Pain   Yes Historical Provider, MD   acetaminophen (TYLENOL) 325 MG tablet Take 650 mg by mouth every 6 hours as needed for Pain   Yes Historical Provider, MD     Past Medical History    has a past medical history of Abnormal pap, Allergic, Anemia, Asthma, Difficult intravenous access, Gestational HTN, History of recurrent UTIs, Hx of blood clots, Miscarriage, MTHFR mutation ([facility] [address] and Spine anomaly. Past Surgical History   has a past surgical history that includes Appendectomy (); LASIK (Bilateral, 2013); [place] tooth extraction (2000); Dilation and curettage of uterus (14); laparoscopy (2014); Dilation and curettage of uterus (2017); pr induced abortn by dil/evac (N/A, 2017); and cyst removal (Bilateral, ). Social History   reports that she has never smoked. She has never used smokeless tobacco. =   reports that she drinks alcohol. reports that she does not use drugs.   Marital Status   Occupation self employed   Family History  Family Status   Relation Name Status    Mother  Alive    Father  Alive    PUnc      MGM  (Not Specified)    MGF      PGM      PGF      Brother  Alive    Son 2 Alive    Mari 1 Alive     family history includes Alzheimer's Disease in her paternal grandmother; Cancer (age of onset: 46) in her paternal uncle; Clotting Disorder in her maternal grandmother; Diabetes in her mother; Heart Attack (age of onset: 59) in her maternal grandfather; Heart Attack (age of onset: 76) in her paternal grandfather; High Blood Pressure in her father; Manuela Gin in her paternal grandfather. OBJECTIVE:   VITALS:  height is 5' 9\" (1.753 m) and weight is 218 lb 4.1 oz (99 kg). Her temporal temperature is 99 °F (37.2 °C). Her blood pressure is 143/72 (abnormal) and her pulse is 87. Her respiration is 16 and oxygen saturation is 100%. CONSTITUTIONAL:alert & orientated x 3, no acute distress. Friendly. SKIN:  Warm and dry, no rashes   HEAD:  Normocephalic, atraumatic   EYES: PERRLA. EOMI    EARS:  Hearing grossly WNL. NOSE:  Nares patent. No rhinorrhea   MOUTH/THROAT:  Mucous membranes moist, benign (states right lower crown- intact)  NECK:supple, no lymphadenopathy  LUNGS: Respirations even and non-labored. Clear to auscultation bilaterally, no wheezes, rales, or rhonchi. CARDIOVASCULAR: Regular rate and rhythm. ABDOMEN: soft, non tender, non distended, no masses or organomegaly, bowel sounds active x 4 , obese  EXTREMITIES: no edema bilateral lower extremities. Peripheral pulses are intact     IMPRESSIONS:   1. AUB   2. Dsymenorrhea  3. Pelvic pain      Diagnosis Date    Abnormal pap     \" thought it was due to yeast infection\"    Allergic     heparin    Anemia     briefly RT bleeding    Asthma     Difficult intravenous access     May need PICC team for access.     Gestational HTN 2016    History of recurrent UTIs     frequent UTI during past pregnancy    Hx of blood clots     small clot behind knee after surgery in  surgeries since w/o problem     Miscarriage     pt has had multiple miscarrages on at 45 weeks,20 ,18 ,14 weeks an multiple at 12 weeks    MTHFR mutation (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 PLANS:   laparoscopic robotic abdominal hysterectomy, BSO, Cystoscopy. PASCUAL CAMPUZANO CNP  Electronically signed 6/14/2019 at 12:08 PM         Attending Physician Statement  I have discussed the care of Eddie Manuel, including pertinent history and exam findings,  with the resident. I have reviewed the key elements of all parts of the encounter with the resident. I agree with the assessment, plan and orders as documented by the resident.   (GE Modifier)

## 2019-06-14 NOTE — BRIEF OP NOTE
Brief Postoperative Note  ______________________________________________________________    Patient: Efe Gardiner  YOB: 1980  MRN: [id number]  Date of Procedure: 6/14/2019    Pre-Op Diagnosis: ENDOMETRIOSIS, ABNORMAL UTERINE BLEEDING    Post-Op Diagnosis: Same       Procedure(s):  XI LAPAROSCOPIC ROBOTIC ABDOMINAL HYSTERECTOMY, BILATERAL SALPINGECTOMY, LEFT OVARIAN CYSTECTOMY, CYSTOSCOPY    Anesthesia: General    Surgeon(s):  Mitali Chand MD    Assistant: NONE    Estimated Blood Loss (mL): 50 mL    Complications: None    Specimens:   UTERUS, CERVIX, BILATERAL TUBES (WITH RIGHT PARATUBAL CYST), LEFT OVARIAN CYST    Implants:  NONE      Drains: NONE    Findings: NORMAL APPEARING RIGHT OVARY AND LEFT FALLOPIAN TUBE, GLOBULAR UTERUS AND VERY TIGHT UTEROSACRAL LIGAMENTS, SLOW SPILL INITIALLY FROM RIGHT URETER WHICH INCREASED AFTER STITCH FROM VAGINAL CUFF WAS RELEASE, PATENT LEFT URETER    Geo Jean MD  Date: 6/14/2019  Time: 12:43 PM

## 2019-06-14 NOTE — PROGRESS NOTES
Notified Dr. Darron More for pt requesting pyridium d/t burning while urinating. Waiting for response.

## 2019-06-17 LAB — HCG, PREGNANCY URINE (POC): NEGATIVE

## 2019-06-17 NOTE — ANESTHESIA POSTPROCEDURE EVALUATION
Department of Anesthesiology  Postprocedure Note    Patient: Mani Galvan    CHART REVIEW  MRN: [id number]  YOB: 1980  Date of evaluation: 6/17/2019  Time:  6:54 AM     Procedure Summary     Date:  06/14/19 Room / Location:  [facility] OR  / [facility] OR    Anesthesia Start:  1242 Anesthesia Stop:  8702    Procedure:  XI LAPAROSCOPIC ROBOTIC ABDOMINAL HYSTERECTOMY, BILATERAL SALPINGECTOMY, LEFT OVARIAN CYSTECTOMY, CYSTOSCOPY (N/A ) Diagnosis:  (ENDOMETRIOSIS, ABNORMAL UTERINE BLEEDING)    Surgeon:  Kristy Nur MD Responsible Provider:  Vanesa Espinoza MD    Anesthesia Type:  general ASA Status:  1          Anesthesia Type: general    Layla Phase I: Layla Score: 10    Layla Phase II: Layla Score: 10    Last vitals: Reviewed and per EMR flowsheets.        Anesthesia Post Evaluation     No Anesthesia related complications    DNAA Thompson

## 2019-06-18 LAB — SURGICAL PATHOLOGY REPORT: NORMAL

## 2019-07-01 ENCOUNTER — OFFICE VISIT (OUTPATIENT)
Dept: OBGYN CLINIC | Age: 39
End: 2019-07-01

## 2019-07-01 ENCOUNTER — HOSPITAL ENCOUNTER (OUTPATIENT)
Age: 39
Setting detail: SPECIMEN
Discharge: HOME | End: 2019-07-01
Payer: COMMERCIAL

## 2019-07-01 VITALS
SYSTOLIC BLOOD PRESSURE: 124 MMHG | DIASTOLIC BLOOD PRESSURE: 70 MMHG | BODY MASS INDEX: 32.32 KG/M2 | HEIGHT: 69 IN | WEIGHT: 218.2 LBS

## 2019-07-01 DIAGNOSIS — N89.8 ITCHING IN THE VAGINAL AREA: ICD-10-CM

## 2019-07-01 DIAGNOSIS — N94.6 DYSMENORRHEA: ICD-10-CM

## 2019-07-01 DIAGNOSIS — N39.3 STRESS INCONTINENCE IN FEMALE: ICD-10-CM

## 2019-07-01 DIAGNOSIS — Z09 POSTOPERATIVE EXAMINATION: ICD-10-CM

## 2019-07-01 DIAGNOSIS — N93.9 ABNORMAL UTERINE BLEEDING (AUB): Primary | ICD-10-CM

## 2019-07-01 DIAGNOSIS — Z15.89 HOMOZYGOUS MTHFR MUTATION A1298C: ICD-10-CM

## 2019-07-01 DIAGNOSIS — Z90.710 S/P HYSTERECTOMY: ICD-10-CM

## 2019-07-01 DIAGNOSIS — R10.2 PELVIC PAIN: ICD-10-CM

## 2019-07-01 DIAGNOSIS — N94.10 DYSPAREUNIA IN FEMALE: ICD-10-CM

## 2019-07-01 PROCEDURE — 99024 POSTOP FOLLOW-UP VISIT: CPT | Performed by: OBSTETRICS & GYNECOLOGY

## 2019-07-02 LAB
DIRECT EXAM: NORMAL
Lab: NORMAL
SPECIMEN DESCRIPTION: NORMAL

## 2019-07-02 RX ORDER — DIAPER,BRIEF,INFANT-TODD,DISP
EACH MISCELLANEOUS
Qty: 1 TUBE | Refills: 1 | Status: SHIPPED | OUTPATIENT
Start: 2019-07-02 | End: 2019-07-09

## 2019-07-25 ENCOUNTER — OFFICE VISIT (OUTPATIENT)
Dept: OBGYN CLINIC | Age: 39
End: 2019-07-25

## 2019-07-25 VITALS
BODY MASS INDEX: 32.44 KG/M2 | SYSTOLIC BLOOD PRESSURE: 122 MMHG | WEIGHT: 219 LBS | DIASTOLIC BLOOD PRESSURE: 74 MMHG | HEIGHT: 69 IN

## 2019-07-25 DIAGNOSIS — Z90.710 S/P HYSTERECTOMY: ICD-10-CM

## 2019-07-25 DIAGNOSIS — Z09 POSTOPERATIVE EXAMINATION: ICD-10-CM

## 2019-07-25 DIAGNOSIS — Z15.89 HOMOZYGOUS MTHFR MUTATION A1298C: ICD-10-CM

## 2019-07-25 DIAGNOSIS — N94.10 DYSPAREUNIA IN FEMALE: ICD-10-CM

## 2019-07-25 DIAGNOSIS — N93.9 ABNORMAL UTERINE BLEEDING (AUB): Primary | ICD-10-CM

## 2019-07-25 DIAGNOSIS — N94.6 DYSMENORRHEA: ICD-10-CM

## 2019-07-25 PROCEDURE — 99024 POSTOP FOLLOW-UP VISIT: CPT | Performed by: OBSTETRICS & GYNECOLOGY

## 2019-08-07 ENCOUNTER — OFFICE VISIT (OUTPATIENT)
Dept: UROLOGY | Age: 39
End: 2019-08-07
Payer: COMMERCIAL

## 2019-08-07 VITALS
SYSTOLIC BLOOD PRESSURE: 123 MMHG | HEART RATE: 79 BPM | WEIGHT: 218.26 LBS | BODY MASS INDEX: 32.33 KG/M2 | TEMPERATURE: 98.3 F | DIASTOLIC BLOOD PRESSURE: 82 MMHG | HEIGHT: 69 IN

## 2019-08-07 DIAGNOSIS — N39.3 STRESS INCONTINENCE IN FEMALE: Primary | ICD-10-CM

## 2019-08-07 DIAGNOSIS — N39.0 LOWER URINARY TRACT INFECTIOUS DISEASE: ICD-10-CM

## 2019-08-07 PROCEDURE — 99213 OFFICE O/P EST LOW 20 MIN: CPT | Performed by: NURSE PRACTITIONER

## 2019-08-07 RX ORDER — MIRABEGRON 50 MG/1
50 TABLET, FILM COATED, EXTENDED RELEASE ORAL DAILY
Qty: 30 TABLET | Refills: 11 | Status: SHIPPED | OUTPATIENT
Start: 2019-08-07

## 2019-08-07 ASSESSMENT — ENCOUNTER SYMPTOMS
NAUSEA: 0
BACK PAIN: 0
COUGH: 0
EYE REDNESS: 0
COLOR CHANGE: 0
EYE PAIN: 0
WHEEZING: 0
VOMITING: 0
SHORTNESS OF BREATH: 0
ABDOMINAL PAIN: 0

## 2019-08-07 NOTE — PROGRESS NOTES
Review of Systems   Constitutional: Negative for activity change, chills and fever. Eyes: Negative for pain, redness and visual disturbance. Respiratory: Negative for cough, shortness of breath and wheezing. Cardiovascular: Negative for chest pain and leg swelling. Gastrointestinal: Negative for abdominal pain, nausea and vomiting. Genitourinary: Negative for difficulty urinating, dysuria, frequency, hematuria, pelvic pain, vaginal bleeding and vaginal discharge. Musculoskeletal: Negative for back pain, joint swelling and myalgias. Skin: Negative for color change and rash. Neurological: Negative for dizziness, tremors and numbness. Hematological: Negative for adenopathy. Does not bruise/bleed easily.
Problem Relation Age of Onset    Diabetes Mother     High Blood Pressure Father     Cancer Paternal Uncle 46        throat/ smoked and drank    Clotting Disorder Maternal Grandmother         blood clots    Heart Attack Maternal Grandfather 59         MI    Alzheimer's Disease Paternal Grandmother     Macular Degen Paternal Grandfather     Heart Attack Paternal Grandfather 76        MI     Outpatient Medications Marked as Taking for the 19 encounter (Office Visit) with CHERELLE Acevedo CNP   Medication Sig Dispense Refill    Mirabegron ER (MYRBETRIQ) 50 MG TB24 Take by mouth      MYRBETRIQ 50 MG TB24 Take 50 mg by mouth daily 30 tablet 11      (All medications reviewed and updated by provider sincelast office visit or hospitalization)   Heparin and Codeine  Social History     Tobacco Use   Smoking Status Never Smoker   Smokeless Tobacco Never Used      (If patient a smoker, smoking cessation counseling offered)     Social History     Substance and Sexual Activity   Alcohol Use Yes    Alcohol/week: 0.0 standard drinks    Comment: rarely       REVIEW OF SYSTEMS:  Review of Systems      Physical Exam:      Vitals:    19 1633   BP: 123/82   Pulse: 79   Temp: 98.3 °F (36.8 °C)     Body mass index is 32.22 kg/m². Patient is a 41 y.o. female in noacute distress and alert and oriented to person, place and time. Physical Exam  Constitutional: Patient in no acute distress. Neuro: Alert andoriented to person, place and time. Psych: Mood normal, affect normal  Skin: No rash noted  HEENT: Head: Normocephalic and atraumatic  Conjunctivae and EOM are normal. Pupils are equal, round  Nose: Normal  Right External Ear: Normal; Left External Ear: Normal  Mouth: Mucosa Moist  Neck: Supple  Lungs: Respiratory effort is normal  Cardiovascular: strong and reg  Abdomen: Soft, non-tender, non-distended  Bladder non-tender and not distended.   Musculoskeletal: Normal gait and station      Assessment and

## 2020-04-29 ENCOUNTER — E-VISIT (OUTPATIENT)
Dept: FAMILY MEDICINE CLINIC | Age: 40
End: 2020-04-29

## 2020-04-29 NOTE — PROGRESS NOTES
I feel this is not an appropriate evisit. I would recommend a DNA Probe prior to treating. Advised her to contact her PCP and/or gynecologist to get this done.

## 2020-05-04 ENCOUNTER — TELEMEDICINE (OUTPATIENT)
Dept: OBGYN CLINIC | Age: 40
End: 2020-05-04
Payer: COMMERCIAL

## 2020-05-04 PROCEDURE — 99213 OFFICE O/P EST LOW 20 MIN: CPT | Performed by: OBSTETRICS & GYNECOLOGY

## 2020-05-04 RX ORDER — FLUCONAZOLE 150 MG/1
150 TABLET ORAL ONCE
Qty: 1 TABLET | Refills: 0 | Status: SHIPPED | OUTPATIENT
Start: 2020-05-04 | End: 2020-05-04

## 2020-05-04 RX ORDER — METRONIDAZOLE 500 MG/1
500 TABLET ORAL 2 TIMES DAILY
Qty: 20 TABLET | Refills: 0 | Status: SHIPPED | OUTPATIENT
Start: 2020-05-04 | End: 2020-05-04

## 2020-05-04 RX ORDER — METRONIDAZOLE 500 MG/1
500 TABLET ORAL 2 TIMES DAILY
Qty: 14 TABLET | Refills: 0 | Status: SHIPPED | OUTPATIENT
Start: 2020-05-04 | End: 2020-05-11

## 2020-05-04 ASSESSMENT — ENCOUNTER SYMPTOMS
CONSTIPATION: 0
COUGH: 0
WHEEZING: 0
NAUSEA: 0
ABDOMINAL PAIN: 0
VOMITING: 0
DIARRHEA: 0

## 2022-12-06 ENCOUNTER — OFFICE VISIT (OUTPATIENT)
Dept: PODIATRY | Age: 42
End: 2022-12-06

## 2022-12-06 VITALS — WEIGHT: 209 LBS | HEIGHT: 69 IN | BODY MASS INDEX: 30.96 KG/M2

## 2022-12-06 DIAGNOSIS — M20.11 HALLUX VALGUS, RIGHT: ICD-10-CM

## 2022-12-06 DIAGNOSIS — M20.12 HALLUX VALGUS, LEFT: Primary | ICD-10-CM

## 2022-12-06 DIAGNOSIS — M79.671 PAIN IN RIGHT FOOT: ICD-10-CM

## 2022-12-06 DIAGNOSIS — M79.672 PAIN IN LEFT FOOT: ICD-10-CM

## 2022-12-06 ASSESSMENT — ENCOUNTER SYMPTOMS
COLOR CHANGE: 0
BACK PAIN: 0
DIARRHEA: 0
SHORTNESS OF BREATH: 0
NAUSEA: 0

## 2022-12-06 NOTE — PROGRESS NOTES
Radha Perez is a 38 y.o. female who presents to the office today with chief complaint of painful bunions to both feet, left greater than right. Chief Complaint   Patient presents with    Bunions     B/l feet    Symptoms have been present since she was about 10years old. Patient denies injury to the feet. Patient states that the bunions are painful with shoe gear and ambulation. Pain is rated 8 out of 10 at it's worst and is described as intermittent. Treatments prior to today's visit include: Patient has tried different shoes without relief. Allergies   Allergen Reactions    Heparin Swelling    Codeine Hives       Past Medical History:   Diagnosis Date    Abnormal pap     \" thought it was due to yeast infection\"    Allergic     heparin    Anemia     briefly RT bleeding    Asthma     Difficult intravenous access     May need PICC team for access. 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  Authorizing Provider   mirabegron (MYRBETRIQ) 50 MG TB24 Take by mouth   Yes Historical Provider, MD       Past Surgical History:   Procedure Laterality Date    APPENDECTOMY  1996    laparoscopy--also 2 left one right ovarian cystectomies    CYST REMOVAL Bilateral 1996    two cyst on left one on right ovaries    DILATION AND CURETTAGE OF UTERUS  12/05/14    DILATION AND CURETTAGE OF UTERUS  07/23/2017    HYSTERECTOMY ABDOMINAL N/A 6/14/2019    XI LAPAROSCOPIC ROBOTIC ABDOMINAL HYSTERECTOMY, BILATERAL SALPINGECTOMY, LEFT OVARIAN CYSTECTOMY, CYSTOSCOPY performed by Ruth Pettit MD at [facility]  2014    exploratory laparoscopy    LASIK Bilateral 2013    OTHER SURGICAL HISTORY Bilateral 2019    XI LAPAROSCOPIC ROBOTIC ABDOMINAL HYSTERECTOMY, BILATERAL SALPINGECTOMY, LEFT OVARIAN CYSTECTOMY, CYSTOSCOPY    MO INDUCED ABORTN BY DIL/EVAC N/A 2017    DILATATION AND CURETTAGE SUCTION performed by Virginie Stark MD at [address] [place] [address]  2000       Family History   Problem Relation Age of Onset    Diabetes Mother     High Blood Pressure Father     Cancer Paternal Uncle 46        throat/ smoked and drank    Clotting Disorder Maternal Grandmother         blood clots    Heart Attack Maternal Grandfather 59         MI    Alzheimer's Disease Paternal Grandmother     Macular Degen Paternal Grandfather     Heart Attack Paternal Grandfather 76        MI       Social History     Tobacco Use    Smoking status: Never    Smokeless tobacco: Never   Substance Use Topics    Alcohol use: Yes     Alcohol/week: 0.0 standard drinks     Comment: rarely       Review of Systems   Constitutional:  Negative for activity change, appetite change, chills, diaphoresis, fatigue and fever. Respiratory:  Negative for shortness of breath. Cardiovascular:  Negative for leg swelling. Gastrointestinal:  Negative for diarrhea and nausea. Endocrine: Negative for cold intolerance, heat intolerance and polyuria. Musculoskeletal:  Negative for arthralgias, back pain, gait problem, joint swelling and myalgias. Skin:  Negative for color change, pallor, rash and wound. Allergic/Immunologic: Negative for environmental allergies and food allergies. Neurological:  Negative for dizziness, weakness, light-headedness and numbness. Hematological:  Does not bruise/bleed easily. Psychiatric/Behavioral:  Negative for behavioral problems, confusion and self-injury. The patient is not nervous/anxious. Vitals:  There were no vitals filed for this visit. General: AAO x 3 in NAD. Integument: There are no rashes, ulcers, or breaks in the skin noted to the bilateral lower extremities. There is no induration, subcutaneous nodules, or tightening of the skin noted to the bilateral.     Toenails 1-5 of the right foot do not present with thickness, elongation, discoloration, brittleness, subungual debris. Toenails 1-5 of the left foot do not present with thickness, elongation, discoloration, brittleness, subungual debris. Interdigital maceration absent to web spaces 1-4, Bilateral.     There are preulcerative lesions noted to the right foot to the plantar medial aspect of the hallux and submetatarsal head five. There is mild pain with palpation to these lesions. There are preulcerative lesions noted to the left foot to the plantar medial aspect of the hallux and submetatarsal head five. There is mild pain with palpation to these lesions. The skin to the bilateral feet is not thin and shiny. The skin to the bilateral feet is  warm, supple, and dry. Vascular: DP pulse of the right foot is  palpable. DP pulse of the left foot is  palpable. PT pulse of the right foot is  palpable. PT pulse of the left foot is  palpable. CFT is less than 3 secs to the digits of the right foot. CFT is less than 3 secs to the digits of the left foot. There is no edema noted to the bilateral foot or ankle. There is hair growth noted to the digits of the bilateral feet. There are no varicosities noted to the right foot/ankle. There are no varicosities noted to the left foot/ankle. Erythema is absent to the bilateral feet. Neurological: Reflexes are present to the right plantar foot and to the Achilles tendon. Reflexes are present to the left plantar foot and to the Achilles tendon. Epicritic sensation is  intact to the right foot.      Epicritic sensation is  intact to the left foot.    Musculoskeletal:  Muscle strength is +5/5 to all four muscle groups of the right lower extremity and +5/5 to all four muscle groups of the left lower extremity. There are no areas of subluxation, dislocation, or laxity noted to either lower extremity. Range of motion to the right ankle is  free of pain or grinding. Range of motion to the left ankle is  free of pain or grinding. Range of motion to the right subtalar joint is  free of pain or grinding. Range of motion to the left subtalar joint is  free of pain or grinding. No abnormalities, asymmetries, or misalignments are seen between the extremities. Weightbearing evaluation does not reveal rearfoot eversion, medial prominence of the talar head, loss of the medial longitudinal arch height, and too many toes sign bilaterally. The lesser digits of the right foot are contracted. The lesser digits of the left foot are contracted. There is prominence noted to the first metatarsal head with abduction of the hallux of the right foot. There is prominence noted to the first metatarsal head with abduction of the hallux of the left foot. Shoe examination was performed. Biomechanical Exam: normal bilaterally. X-ray's taken: AP, Lateral, and Medial Oblique of the bilateral foot. Findings: There is a large increase in the first intermetatarsal angle bilaterally. There is adduction of the lesser metatarsals bilaterally. There is obliquity noted to the first metatarsal cuneiform joint bilaterally. There is elevation of the first metatarsal noted on the lateral view bilaterally. Asessment: Patient is a 38 y.o. female with:    Diagnosis Orders   1. Hallux valgus, left  XR FOOT LEFT (MIN 3 VIEWS)      2. Hallux valgus, right  XR FOOT RIGHT (MIN 3 VIEWS)      3. Pain in left foot  XR FOOT LEFT (MIN 3 VIEWS)      4. Pain in right foot  XR FOOT RIGHT (MIN 3 VIEWS)          Plan:  1. Clinical evaluation of the patient.  2. Patient informed that based on her symptoms, as well as my clinical and radiographic findings, I am recommending surgery to correct her bunion deformities. Patient would require a Lapidus bunionectomy to each foot, but could only do one foot at a time as she will need to be NWB to the operative foot. Patient would like to have the left foot corrected first and will call with a time that she can do this. 3. Contact office with any questions/problems/concerns. Return if symptoms worsen or fail to improve.    12/6/2022      Britt Yi DPM

## 2022-12-20 NOTE — H&P
HISTORY and [id number]       NAME:  Vinny Durand  MRN: [id number]   YOB: 1980   Date: 12/21/2022   Age: 38 y.o. Gender: female     COMPLAINT AND PRESENT HISTORY:   Vinny Durand is 38 y.o.,  female, presents for pre-anesthesia/admission testing for LAPIDUS FOOT BUNIONECTOMY per Dr. Bell Doe . Primary dx: HALLUX ABDUCTO VALGUS LEFT.    HPI:  Bunions:   Pt complain of  a bulging/ bump on the outside of the base of your big left toe since she was 10years old . Associated symptoms swelling, redness , soreness around your big toe joint. She has constant pain with Limited movement of her big toe. Pain rated 5/10. Pain Aggravated by walking /standing for long time. Pt used Tylenol with no pain relief. RECENT IMAGING R/T HPI   XR FOOT LEFT (MIN 3 VIEWS) [YJA000]  X-ray's taken: AP, Lateral, and Medial Oblique of the bilateral foot. Findings: There is a large increase in the first intermetatarsal angle bilaterally. There is adduction of the lesser metatarsals bilaterally. There is obliquity noted to the first metatarsal cuneiform joint bilaterally. There is elevation of the first metatarsal noted on the lateral view bilaterally. Review of additional significant medical hx:  Asthma  Current no medication use. Activity level:  Functional Capacity per patient:   1. Patient is able to walk 2 city blocks on level ground without SOB. 2. Patient is able to climb 2 flights of stairs without SOB. Denies hx of MRSA infection. Pt has hx of blood clots small clot behind knee after surgery in 1996 surgeries since w/o problem   Denies hx of any personal or family hx of complications w/anesthesia. PAST MEDICAL HISTORY     Past Medical History:   Diagnosis Date    Abnormal pap     \" thought it was due to yeast infection\"    Allergic     heparin    Anemia     briefly RT bleeding    Asthma     Difficult intravenous access     May need PICC team for access. 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       SURGICAL HISTORY       Past Surgical History:   Procedure Laterality Date    APPENDECTOMY  1996    laparoscopy--also 2 left one right ovarian cystectomies    CYST REMOVAL Bilateral 1996    two cyst on left one on right ovaries    DILATION AND CURETTAGE OF UTERUS  12/05/14    DILATION AND CURETTAGE OF UTERUS  07/23/2017    HYSTERECTOMY ABDOMINAL N/A 6/14/2019    XI LAPAROSCOPIC ROBOTIC ABDOMINAL HYSTERECTOMY, BILATERAL SALPINGECTOMY, LEFT OVARIAN CYSTECTOMY, CYSTOSCOPY performed by Ruth Pettit MD at [address] [place] [address]  12/05/2014    exploratory laparoscopy    LASIK Bilateral 09/2013    OTHER SURGICAL HISTORY Bilateral 06/14/2019    XI LAPAROSCOPIC ROBOTIC ABDOMINAL HYSTERECTOMY, BILATERAL SALPINGECTOMY, LEFT OVARIAN CYSTECTOMY, CYSTOSCOPY    OR INDUCED ABORTN BY DIL/EVAC N/A 7/23/2017    DILATATION AND CURETTAGE SUCTION performed by Virginie Stark MD at [address] [place] [address]  12/2000       SOCIAL HISTORY       Social History     Socioeconomic History    Marital status:      Spouse name: None    Number of children: None    Years of education: None    Highest education level: None   Tobacco Use    Smoking status: Never    Smokeless tobacco: Never   Vaping Use    Vaping Use: Never used   Substance and Sexual Activity    Alcohol use:  Yes     Alcohol/week: 0.0 standard drinks     Comment: rarely    Drug use: No    Sexual activity: Yes     Partners: Male       REVIEW OF SYSTEMS      Allergies   Allergen Reactions    Heparin Anaphylaxis    Codeine Hives Current Outpatient Medications on File Prior to Encounter   Medication Sig Dispense Refill    mirabegron (MYRBETRIQ) 50 MG TB24 Take by mouth       No current facility-administered medications on file prior to encounter. Review of Systems   Constitutional: Negative. HENT: Negative. Eyes: Negative. Respiratory: Negative. Cardiovascular: Negative. Gastrointestinal: Negative. Genitourinary: Negative. Musculoskeletal: Negative. Skin: Negative. Neurological: Negative. Hematological:  Bruises/bleeds easily. Psychiatric/Behavioral: Negative. GENERAL PHYSICAL EXAM     Vitals: BP (!) 156/96   Pulse 80   Temp 97.9 °F (36.6 °C) (Infrared)   Resp 18   Ht 5' 9\" (1.753 m)   Wt 209 lb (94.8 kg)   LMP  (LMP Unknown)   SpO2 100%   BMI 30.86 kg/m²               Physical Exam  Constitutional:       General: She is not in acute distress. Appearance: Normal appearance. She is not ill-appearing. HENT:      Head: Normocephalic. Right Ear: External ear normal.      Left Ear: External ear normal.      Mouth/Throat:      Mouth: Mucous membranes are moist.   Eyes:      General:         Right eye: No discharge. Left eye: No discharge. Cardiovascular:      Rate and Rhythm: Normal rate and regular rhythm. Pulses: Normal pulses. Radial pulses are 2+ on the right side and 2+ on the left side. Dorsalis pedis pulses are 2+ on the right side and 2+ on the left side. Posterior tibial pulses are 2+ on the right side and 2+ on the left side. Heart sounds: Normal heart sounds. No murmur heard. Comments: Hypertensive   Pulmonary:      Effort: Pulmonary effort is normal.      Breath sounds: No wheezing or rhonchi. Abdominal:      General: Bowel sounds are normal. There is no distension. Palpations: Abdomen is soft. Tenderness: There is no abdominal tenderness. Musculoskeletal:         General: Swelling and tenderness present. Normal range of motion. Cervical back: Normal range of motion and neck supple. Right lower leg: No edema. Left lower leg: No edema. Comments: There is a  bump on the side of left first metatarsal head  with with abduction of the hallux ,Tenderness with palpation ,erythema, swelling, limited ROM. Skin:     General: Skin is warm and dry. Findings: No bruising or erythema. Neurological:      General: No focal deficit present. Mental Status: She is alert and oriented to person, place, and time. Motor: No weakness. Gait: Gait normal.   Psychiatric:         Mood and Affect: Mood normal.         Behavior: Behavior normal.       LAB REVIEW     Lab Results   Component Value Date    WBC 9.1 12/21/2022    HGB 13.5 12/21/2022    HCT 40.3 12/21/2022    MCV 84.6 12/21/2022     (H) 12/21/2022     Lab Results   Component Value Date/Time     12/21/2022 02:15 PM    K 4.0 12/21/2022 02:15 PM     12/21/2022 02:15 PM    CO2 22 12/21/2022 02:15 PM    BUN 15 12/21/2022 02:15 PM    CREATININE 0.67 12/21/2022 02:15 PM    GLUCOSE 91 12/21/2022 02:15 PM    CALCIUM 9.1 12/21/2022 02:15 PM        SURGERY / PROVISIONAL DIAGNOSES:      LAPIDUS FOOT BUNIONECTOMY    HALLUX ABDUCTO VALGUS LEFT    Patient Active Problem List    Diagnosis Date Noted    Homozygous MTHFR mutation [id number] 06/27/2016    Recurrent early pregnancy loss 12/15/2015    Asthma  12/15/2015    S/P hysterectomy 06/14/2019    S/P laparoscopy 06/14/2019    Abnormal uterine bleeding (AUB) 04/09/2019    Pelvic pain 04/09/2019    Dysmenorrhea 04/09/2019    Dyspareunia in female 04/09/2019           CLEARANCE:   Dr. Marisol Preciado, anesthesia, was contacted and informed of patient's history and planned surgery. Orders received and no clearance required.       Total time spent on encounter- PAT provider minutes: 21-30 minutes     CHERELLE John CNP on 12/21/2022 at 3:04 PM

## 2022-12-20 NOTE — H&P (VIEW-ONLY)
HISTORY and Tre[facility] RODOLFO [id number]       NAME:  Rahul Austin  MRN: [id number]   YOB: 1980   Date: 12/21/2022   Age: 38 y.o. Gender: female     COMPLAINT AND PRESENT HISTORY:   Rahul Austin is 38 y.o.,  female, presents for pre-anesthesia/admission testing for LAPIDUS FOOT BUNIONECTOMY per Dr. Surendra Agudelo . Primary dx: HALLUX ABDUCTO VALGUS LEFT.    HPI:  Bunions:   Pt complain of  a bulging/ bump on the outside of the base of your big left toe since she was 10years old . Associated symptoms swelling, redness , soreness around your big toe joint. She has constant pain with Limited movement of her big toe. Pain rated 5/10. Pain Aggravated by walking /standing for long time. Pt used Tylenol with no pain relief. RECENT IMAGING R/T HPI   XR FOOT LEFT (MIN 3 VIEWS) [[id number]]  X-ray's taken: AP, Lateral, and Medial Oblique of the bilateral foot. Findings: There is a large increase in the first intermetatarsal angle bilaterally. There is adduction of the lesser metatarsals bilaterally. There is obliquity noted to the first metatarsal cuneiform joint bilaterally. There is elevation of the first metatarsal noted on the lateral view bilaterally. Review of additional significant medical hx:  Asthma  Current no medication use. Activity level:  Functional Capacity per patient:   1. Patient is able to walk 2 city blocks on level ground without SOB. 2. Patient is able to climb 2 flights of stairs without SOB. Denies hx of MRSA infection. Pt has hx of blood clots small clot behind knee after surgery in 1996 surgeries since w/o problem   Denies hx of any personal or family hx of complications w/anesthesia. PAST MEDICAL HISTORY     Past Medical History:   Diagnosis Date    Abnormal pap     \" thought it was due to yeast infection\"    Allergic     heparin    Anemia     briefly RT bleeding    Asthma     Difficult intravenous access     May need PICC team for access. 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       SURGICAL HISTORY       Past Surgical History:   Procedure Laterality Date    APPENDECTOMY      laparoscopy--also 2 left one right ovarian cystectomies    CYST REMOVAL Bilateral     two cyst on left one on right ovaries    DILATION AND CURETTAGE OF UTERUS  14    DILATION AND CURETTAGE OF UTERUS  2017    HYSTERECTOMY ABDOMINAL N/A 2019    XI LAPAROSCOPIC ROBOTIC ABDOMINAL HYSTERECTOMY, BILATERAL SALPINGECTOMY, LEFT OVARIAN CYSTECTOMY, CYSTOSCOPY performed by Deja Lozada MD at [address] [place] [address]  2014    exploratory laparoscopy    LASIK Bilateral 2013    OTHER SURGICAL HISTORY Bilateral 2019    XI LAPAROSCOPIC ROBOTIC ABDOMINAL HYSTERECTOMY, BILATERAL SALPINGECTOMY, LEFT OVARIAN CYSTECTOMY, CYSTOSCOPY    ID INDUCED ABORTN BY DIL/EVAC N/A 2017    DILATATION AND CURETTAGE SUCTION performed by Ni Negron MD at [address] [place] [address]  2000       SOCIAL HISTORY       Social History     Socioeconomic History    Marital status:      Spouse name: None    Number of children: None    Years of education: None    Highest education level: None   Tobacco Use    Smoking status: Never    Smokeless tobacco: Never   Vaping Use    Vaping Use: Never used   Substance and Sexual Activity    Alcohol use:  Yes     Alcohol/week: 0.0 standard drinks     Comment: rarely    Drug use: No    Sexual activity: Yes     Partners: Male       REVIEW OF SYSTEMS      Allergies   Allergen Reactions    Heparin Anaphylaxis    Codeine Hives Current Outpatient Medications on File Prior to Encounter   Medication Sig Dispense Refill    mirabegron (MYRBETRIQ) 50 MG TB24 Take by mouth       No current facility-administered medications on file prior to encounter. Review of Systems   Constitutional: Negative. HENT: Negative. Eyes: Negative. Respiratory: Negative. Cardiovascular: Negative. Gastrointestinal: Negative. Genitourinary: Negative. Musculoskeletal: Negative. Skin: Negative. Neurological: Negative. Hematological:  Bruises/bleeds easily. Psychiatric/Behavioral: Negative. GENERAL PHYSICAL EXAM     Vitals: BP (!) 156/96   Pulse 80   Temp 97.9 °F (36.6 °C) (Infrared)   Resp 18   Ht 5' 9\" (1.753 m)   Wt 209 lb (94.8 kg)   LMP  (LMP Unknown)   SpO2 100%   BMI 30.86 kg/m²               Physical Exam  Constitutional:       General: She is not in acute distress. Appearance: Normal appearance. She is not ill-appearing. HENT:      Head: Normocephalic. Right Ear: External ear normal.      Left Ear: External ear normal.      Mouth/Throat:      Mouth: Mucous membranes are moist.   Eyes:      General:         Right eye: No discharge. Left eye: No discharge. Cardiovascular:      Rate and Rhythm: Normal rate and regular rhythm. Pulses: Normal pulses. Radial pulses are 2+ on the right side and 2+ on the left side. Dorsalis pedis pulses are 2+ on the right side and 2+ on the left side. Posterior tibial pulses are 2+ on the right side and 2+ on the left side. Heart sounds: Normal heart sounds. No murmur heard. Comments: Hypertensive   Pulmonary:      Effort: Pulmonary effort is normal.      Breath sounds: No wheezing or rhonchi. Abdominal:      General: Bowel sounds are normal. There is no distension. Palpations: Abdomen is soft. Tenderness: There is no abdominal tenderness. Musculoskeletal:         General: Swelling and tenderness present. Normal range of motion. Cervical back: Normal range of motion and neck supple. Right lower leg: No edema. Left lower leg: No edema. Comments: There is a  bump on the side of left first metatarsal head  with with abduction of the hallux ,Tenderness with palpation ,erythema, swelling, limited ROM. Skin:     General: Skin is warm and dry. Findings: No bruising or erythema. Neurological:      General: No focal deficit present. Mental Status: She is alert and oriented to person, place, and time. Motor: No weakness. Gait: Gait normal.   Psychiatric:         Mood and Affect: Mood normal.         Behavior: Behavior normal.       LAB REVIEW     Lab Results   Component Value Date    WBC 9.1 12/21/2022    HGB 13.5 12/21/2022    HCT 40.3 12/21/2022    MCV 84.6 12/21/2022     (H) 12/21/2022     Lab Results   Component Value Date/Time     12/21/2022 02:15 PM    K 4.0 12/21/2022 02:15 PM     12/21/2022 02:15 PM    CO2 22 12/21/2022 02:15 PM    BUN 15 12/21/2022 02:15 PM    CREATININE 0.67 12/21/2022 02:15 PM    GLUCOSE 91 12/21/2022 02:15 PM    CALCIUM 9.1 12/21/2022 02:15 PM        SURGERY / PROVISIONAL DIAGNOSES:      LAPIDUS FOOT BUNIONECTOMY    HALLUX ABDUCTO VALGUS LEFT    Patient Active Problem List    Diagnosis Date Noted    Homozygous MTHFR mutation [id number] 06/27/2016    Recurrent early pregnancy loss 12/15/2015    Asthma  12/15/2015    S/P hysterectomy 06/14/2019    S/P laparoscopy 06/14/2019    Abnormal uterine bleeding (AUB) 04/09/2019    Pelvic pain 04/09/2019    Dysmenorrhea 04/09/2019    Dyspareunia in female 04/09/2019           CLEARANCE:   Dr. Elkin Sagastume, anesthesia, was contacted and informed of patient's history and planned surgery. Orders received and no clearance required.       Total time spent on encounter- PAT provider minutes: 21-30 minutes     CHERELLE John CNP on 12/21/2022 at 3:04 PM

## 2022-12-21 ENCOUNTER — HOSPITAL ENCOUNTER (OUTPATIENT)
Dept: PREADMISSION TESTING | Age: 42
Discharge: HOME | End: 2022-12-21
Payer: COMMERCIAL

## 2022-12-21 VITALS
WEIGHT: 209 LBS | OXYGEN SATURATION: 100 % | TEMPERATURE: 97.9 F | HEIGHT: 69 IN | DIASTOLIC BLOOD PRESSURE: 96 MMHG | SYSTOLIC BLOOD PRESSURE: 156 MMHG | BODY MASS INDEX: 30.96 KG/M2 | RESPIRATION RATE: 18 BRPM | HEART RATE: 80 BPM

## 2022-12-21 DIAGNOSIS — Z01.818 PREOP EXAMINATION: ICD-10-CM

## 2022-12-21 LAB
ABSOLUTE EOS #: 0.1 K/UL (ref 0–0.4)
ABSOLUTE LYMPH #: 2.6 K/UL (ref 1–4.8)
ABSOLUTE MONO #: 0.9 K/UL (ref 0.1–1.3)
ANION GAP SERPL CALCULATED.3IONS-SCNC: 11 MMOL/L (ref 9–17)
BASOPHILS # BLD: 1 % (ref 0–2)
BASOPHILS ABSOLUTE: 0.1 K/UL (ref 0–0.2)
BUN BLDV-MCNC: 15 MG/DL (ref 6–20)
CALCIUM SERPL-MCNC: 9.1 MG/DL (ref 8.6–10.4)
CHLORIDE BLD-SCNC: 103 MMOL/L (ref 98–107)
CO2: 22 MMOL/L (ref 20–31)
CREAT SERPL-MCNC: 0.67 MG/DL (ref 0.5–0.9)
EOSINOPHILS RELATIVE PERCENT: 2 % (ref 0–4)
GFR SERPL CREATININE-BSD FRML MDRD: >60 ML/MIN/1.73M2
GLUCOSE BLD-MCNC: 91 MG/DL (ref 70–99)
HCT VFR BLD CALC: 40.3 % (ref 36–46)
HEMOGLOBIN: 13.5 G/DL (ref 12–16)
LYMPHOCYTES # BLD: 29 % (ref 24–44)
MCH RBC QN AUTO: 28.3 PG (ref 26–34)
MCHC RBC AUTO-ENTMCNC: 33.5 G/DL (ref 31–37)
MCV RBC AUTO: 84.6 FL (ref 80–100)
MONOCYTES # BLD: 9 % (ref 1–7)
PDW BLD-RTO: 13.4 % (ref 11.5–14.9)
PLATELET # BLD: 472 K/UL (ref 150–450)
PMV BLD AUTO: 7.9 FL (ref 6–12)
POTASSIUM SERPL-SCNC: 4 MMOL/L (ref 3.7–5.3)
RBC # BLD: 4.77 M/UL (ref 4–5.2)
SEG NEUTROPHILS: 59 % (ref 36–66)
SEGMENTED NEUTROPHILS ABSOLUTE COUNT: 5.4 K/UL (ref 1.3–9.1)
SODIUM BLD-SCNC: 136 MMOL/L (ref 135–144)
WBC # BLD: 9.1 K/UL (ref 3.5–11)

## 2022-12-21 PROCEDURE — APPSS30 APP SPLIT SHARED TIME 16-30 MINUTES: Performed by: NURSE PRACTITIONER

## 2022-12-21 PROCEDURE — 36415 COLL VENOUS BLD VENIPUNCTURE: CPT

## 2022-12-21 PROCEDURE — 80048 BASIC METABOLIC PNL TOTAL CA: CPT

## 2022-12-21 PROCEDURE — 85025 COMPLETE CBC W/AUTO DIFF WBC: CPT

## 2022-12-21 ASSESSMENT — PAIN DESCRIPTION - ORIENTATION: ORIENTATION: LEFT

## 2022-12-21 ASSESSMENT — ENCOUNTER SYMPTOMS
GASTROINTESTINAL NEGATIVE: 1
EYES NEGATIVE: 1
RESPIRATORY NEGATIVE: 1

## 2022-12-21 ASSESSMENT — PAIN DESCRIPTION - DESCRIPTORS: DESCRIPTORS: ACHING

## 2022-12-21 ASSESSMENT — PAIN SCALES - GENERAL: PAINLEVEL_OUTOF10: 5

## 2022-12-21 ASSESSMENT — PAIN DESCRIPTION - PAIN TYPE: TYPE: ACUTE PAIN

## 2022-12-21 ASSESSMENT — PAIN DESCRIPTION - LOCATION: LOCATION: FOOT

## 2022-12-21 NOTE — DISCHARGE INSTRUCTIONS
Pre-op Instructions For Out-Patient Surgery    Medication Instructions:  Please stop herbs and any supplements now (includes vitamins and minerals). Please contact your surgeon and prescribing physician for pre-op instructions for any blood thinners. If you have inhalers/aerosol treatments at home, please use them the morning of your surgery and bring the inhalers with you to the hospital.    Please take the following medications the morning of your surgery with a sip of water:    none    Surgery Instructions:  After midnight before surgery:  Do not eat or drink anything, including water, mints, gum, and hard candy. You may brush your teeth without swallowing. No smoking, chewing tobacco, or street drugs. Please shower or bathe before surgery. If you were given Surgical Scrub Chlorhexidine Gluconate Liquid (CHG), please shower the night before and the morning of your surgery following the detailed instructions you received during your pre-admission visit. Please do not wear any cologne, lotion, powder, deodorant, jewelry, piercings, perfume, makeup, nail polish, hair accessories, or hair spray on the day of surgery. Wear loose comfortable clothing. Leave your valuables at home. Bring a storage case for any glasses/contacts. An adult who is responsible for you MUST drive you home and should be with you for the first 24 hours after surgery. If having out-patient knee and foot surgeries, please arrange for planned crutches, walker, or wheelchair before arriving to the hospital.    The Day of Surgery:  Arrive at [facility] AT [facility] Surgery Entrance at the time directed by your surgeon and check in at the desk. If you have a living will or healthcare power of , please bring a copy. You will be taken to the pre-op holding area where you will be prepared for surgery. A physical assessment will be performed by a nurse practitioner or house officer. Your IV will be started and you will meet your anesthesiologist.    When you go to surgery, your family will be directed to the surgical waiting room, where the doctor should speak with them after your surgery. After surgery, you will be taken to the recovery room then when you are awake and stable you will go to the short stay unit for preparation to be discharged. If you use a Bi-PAP or C-PAP machine, please bring it with you and leave it in the car in case it is needed in recovery room.

## 2022-12-21 NOTE — PROGRESS NOTES
Patient states she has an extreme fear of needles and has difficult veins, usually the picc team or ultrasound are needed to start IV's or draw labs. She is also expressing concern over having a nerve block and wants that done after she is asleep for surgery, she states that she CAN NOT be awake for nerve block. Patient does not have a PCP. Lab was paged for blood draw today, she does state that a butterfly needs to be used.

## 2023-01-03 ENCOUNTER — ANESTHESIA EVENT (OUTPATIENT)
Dept: OPERATING ROOM | Age: 43
End: 2023-01-03
Payer: COMMERCIAL

## 2023-01-03 NOTE — PRE-PROCEDURE INSTRUCTIONS
No answer, left message ? Unable to leave message ? When were you told to arrive at hospital ?[facility]      Do you have a  ?y    Are you on any blood thinners ? n                If yes when did you stop taking ? Do you have your prep Rx filled and instruction ? Nothing to eat the day before , only clear liquids. Are you experiencing any covid symptoms ? n    Do you have any infections or rash we should be aware of ?n      Do you have the Hibiclens soap to use the night before and the morning of surgery ?y    Nothing to eat or drink after midnight, only a sip of water to take any medication instructed to take the night before.   Wear comfortable clothing, leave any valuables at home, remove any jewelry and body piercing . y

## 2023-01-04 ENCOUNTER — HOSPITAL ENCOUNTER (OUTPATIENT)
Age: 43
Setting detail: OUTPATIENT SURGERY
Discharge: HOME | End: 2023-01-04
Attending: PODIATRIST | Admitting: PODIATRIST
Payer: COMMERCIAL

## 2023-01-04 ENCOUNTER — ANESTHESIA (OUTPATIENT)
Dept: OPERATING ROOM | Age: 43
End: 2023-01-04
Payer: COMMERCIAL

## 2023-01-04 ENCOUNTER — APPOINTMENT (OUTPATIENT)
Dept: GENERAL RADIOLOGY | Age: 43
End: 2023-01-04
Attending: PODIATRIST
Payer: COMMERCIAL

## 2023-01-04 VITALS
TEMPERATURE: 97.8 F | HEIGHT: 69 IN | HEART RATE: 69 BPM | OXYGEN SATURATION: 100 % | DIASTOLIC BLOOD PRESSURE: 100 MMHG | SYSTOLIC BLOOD PRESSURE: 147 MMHG | BODY MASS INDEX: 30.96 KG/M2 | RESPIRATION RATE: 20 BRPM | WEIGHT: 209 LBS

## 2023-01-04 DIAGNOSIS — G89.18 POST-OP PAIN: Primary | ICD-10-CM

## 2023-01-04 PROCEDURE — 6360000002 HC RX W HCPCS: Performed by: PODIATRIST

## 2023-01-04 PROCEDURE — 2580000003 HC RX 258: Performed by: NURSE ANESTHETIST, CERTIFIED REGISTERED

## 2023-01-04 PROCEDURE — 2500000003 HC RX 250 WO HCPCS: Performed by: NURSE ANESTHETIST, CERTIFIED REGISTERED

## 2023-01-04 PROCEDURE — 2580000003 HC RX 258: Performed by: ANESTHESIOLOGY

## 2023-01-04 PROCEDURE — 3600000003 HC SURGERY LEVEL 3 BASE: Performed by: PODIATRIST

## 2023-01-04 PROCEDURE — 2500000003 HC RX 250 WO HCPCS: Performed by: ANESTHESIOLOGY

## 2023-01-04 PROCEDURE — 7100000011 HC PHASE II RECOVERY - ADDTL 15 MIN: Performed by: PODIATRIST

## 2023-01-04 PROCEDURE — 7100000030 HC ASPR PHASE II RECOVERY - FIRST 15 MIN: Performed by: PODIATRIST

## 2023-01-04 PROCEDURE — 7100000031 HC ASPR PHASE II RECOVERY - ADDTL 15 MIN: Performed by: PODIATRIST

## 2023-01-04 PROCEDURE — 3600000013 HC SURGERY LEVEL 3 ADDTL 15MIN: Performed by: PODIATRIST

## 2023-01-04 PROCEDURE — 3700000000 HC ANESTHESIA ATTENDED CARE: Performed by: PODIATRIST

## 2023-01-04 PROCEDURE — 7100000000 HC PACU RECOVERY - FIRST 15 MIN: Performed by: PODIATRIST

## 2023-01-04 PROCEDURE — C1769 GUIDE WIRE: HCPCS | Performed by: PODIATRIST

## 2023-01-04 PROCEDURE — 6360000002 HC RX W HCPCS: Performed by: NURSE ANESTHETIST, CERTIFIED REGISTERED

## 2023-01-04 PROCEDURE — 6370000000 HC RX 637 (ALT 250 FOR IP): Performed by: ANESTHESIOLOGY

## 2023-01-04 PROCEDURE — C1713 ANCHOR/SCREW BN/BN,TIS/BN: HCPCS | Performed by: PODIATRIST

## 2023-01-04 PROCEDURE — 7100000010 HC PHASE II RECOVERY - FIRST 15 MIN: Performed by: PODIATRIST

## 2023-01-04 PROCEDURE — 2720000010 HC SURG SUPPLY STERILE: Performed by: PODIATRIST

## 2023-01-04 PROCEDURE — 2709999900 HC NON-CHARGEABLE SUPPLY: Performed by: PODIATRIST

## 2023-01-04 PROCEDURE — 3209999900 FLUORO FOR SURGICAL PROCEDURES

## 2023-01-04 PROCEDURE — 3700000001 HC ADD 15 MINUTES (ANESTHESIA): Performed by: PODIATRIST

## 2023-01-04 PROCEDURE — 7100000001 HC PACU RECOVERY - ADDTL 15 MIN: Performed by: PODIATRIST

## 2023-01-04 PROCEDURE — 2500000003 HC RX 250 WO HCPCS: Performed by: PODIATRIST

## 2023-01-04 DEVICE — HEADLESS COMPRESSION SCREW
Type: IMPLANTABLE DEVICE | Site: FOOT | Status: FUNCTIONAL
Brand: FIXOS

## 2023-01-04 RX ORDER — ONDANSETRON 2 MG/ML
INJECTION INTRAMUSCULAR; INTRAVENOUS PRN
Status: DISCONTINUED | OUTPATIENT
Start: 2023-01-04 | End: 2023-01-04 | Stop reason: SDUPTHER

## 2023-01-04 RX ORDER — SODIUM CHLORIDE 0.9 % (FLUSH) 0.9 %
5-40 SYRINGE (ML) INJECTION EVERY 12 HOURS SCHEDULED
Status: DISCONTINUED | OUTPATIENT
Start: 2023-01-04 | End: 2023-01-04 | Stop reason: HOSPADM

## 2023-01-04 RX ORDER — FENTANYL CITRATE 50 UG/ML
INJECTION, SOLUTION INTRAMUSCULAR; INTRAVENOUS PRN
Status: DISCONTINUED | OUTPATIENT
Start: 2023-01-04 | End: 2023-01-04 | Stop reason: SDUPTHER

## 2023-01-04 RX ORDER — PROPOFOL 10 MG/ML
INJECTION, EMULSION INTRAVENOUS CONTINUOUS PRN
Status: DISCONTINUED | OUTPATIENT
Start: 2023-01-04 | End: 2023-01-04 | Stop reason: SDUPTHER

## 2023-01-04 RX ORDER — SODIUM CHLORIDE 0.9 % (FLUSH) 0.9 %
5-40 SYRINGE (ML) INJECTION PRN
Status: DISCONTINUED | OUTPATIENT
Start: 2023-01-04 | End: 2023-01-04 | Stop reason: HOSPADM

## 2023-01-04 RX ORDER — PHENAZOPYRIDINE HYDROCHLORIDE 200 MG/1
200 TABLET, FILM COATED ORAL
Status: DISCONTINUED | OUTPATIENT
Start: 2023-01-04 | End: 2023-01-04 | Stop reason: HOSPADM

## 2023-01-04 RX ORDER — SODIUM CHLORIDE 9 MG/ML
INJECTION, SOLUTION INTRAVENOUS PRN
Status: DISCONTINUED | OUTPATIENT
Start: 2023-01-04 | End: 2023-01-04 | Stop reason: HOSPADM

## 2023-01-04 RX ORDER — MIDAZOLAM HYDROCHLORIDE 1 MG/ML
2 INJECTION INTRAMUSCULAR; INTRAVENOUS ONCE
Status: DISCONTINUED | OUTPATIENT
Start: 2023-01-04 | End: 2023-01-04 | Stop reason: HOSPADM

## 2023-01-04 RX ORDER — LIDOCAINE HYDROCHLORIDE 20 MG/ML
INJECTION, SOLUTION EPIDURAL; INFILTRATION; INTRACAUDAL; PERINEURAL PRN
Status: DISCONTINUED | OUTPATIENT
Start: 2023-01-04 | End: 2023-01-04 | Stop reason: SDUPTHER

## 2023-01-04 RX ORDER — SODIUM CHLORIDE, SODIUM LACTATE, POTASSIUM CHLORIDE, CALCIUM CHLORIDE 600; 310; 30; 20 MG/100ML; MG/100ML; MG/100ML; MG/100ML
INJECTION, SOLUTION INTRAVENOUS CONTINUOUS
Status: DISCONTINUED | OUTPATIENT
Start: 2023-01-04 | End: 2023-01-04 | Stop reason: HOSPADM

## 2023-01-04 RX ORDER — ONDANSETRON 2 MG/ML
4 INJECTION INTRAMUSCULAR; INTRAVENOUS
Status: DISCONTINUED | OUTPATIENT
Start: 2023-01-04 | End: 2023-01-04 | Stop reason: HOSPADM

## 2023-01-04 RX ORDER — LIDOCAINE HYDROCHLORIDE 10 MG/ML
1 INJECTION, SOLUTION EPIDURAL; INFILTRATION; INTRACAUDAL; PERINEURAL
Status: COMPLETED | OUTPATIENT
Start: 2023-01-04 | End: 2023-01-04

## 2023-01-04 RX ORDER — BUPIVACAINE HYDROCHLORIDE 5 MG/ML
INJECTION, SOLUTION EPIDURAL; INTRACAUDAL PRN
Status: DISCONTINUED | OUTPATIENT
Start: 2023-01-04 | End: 2023-01-04 | Stop reason: ALTCHOICE

## 2023-01-04 RX ORDER — DIPHENHYDRAMINE HYDROCHLORIDE 50 MG/ML
12.5 INJECTION INTRAMUSCULAR; INTRAVENOUS
Status: DISCONTINUED | OUTPATIENT
Start: 2023-01-04 | End: 2023-01-04 | Stop reason: HOSPADM

## 2023-01-04 RX ORDER — ACETAMINOPHEN 500 MG
1000 TABLET ORAL ONCE
Status: COMPLETED | OUTPATIENT
Start: 2023-01-04 | End: 2023-01-04

## 2023-01-04 RX ORDER — GABAPENTIN 300 MG/1
300 CAPSULE ORAL ONCE
Status: COMPLETED | OUTPATIENT
Start: 2023-01-04 | End: 2023-01-04

## 2023-01-04 RX ORDER — DOXYCYCLINE HYCLATE 100 MG
100 TABLET ORAL 2 TIMES DAILY
Qty: 20 TABLET | Refills: 0 | Status: SHIPPED | OUTPATIENT
Start: 2023-01-04 | End: 2023-01-14

## 2023-01-04 RX ORDER — SODIUM CHLORIDE 9 MG/ML
INJECTION, SOLUTION INTRAVENOUS CONTINUOUS PRN
Status: DISCONTINUED | OUTPATIENT
Start: 2023-01-04 | End: 2023-01-04 | Stop reason: SDUPTHER

## 2023-01-04 RX ORDER — OXYCODONE HYDROCHLORIDE AND ACETAMINOPHEN 5; 325 MG/1; MG/1
1 TABLET ORAL EVERY 6 HOURS PRN
Qty: 28 TABLET | Refills: 0 | Status: SHIPPED | OUTPATIENT
Start: 2023-01-04 | End: 2023-01-11

## 2023-01-04 RX ORDER — PROPOFOL 10 MG/ML
INJECTION, EMULSION INTRAVENOUS PRN
Status: DISCONTINUED | OUTPATIENT
Start: 2023-01-04 | End: 2023-01-04 | Stop reason: SDUPTHER

## 2023-01-04 RX ORDER — MIDAZOLAM HYDROCHLORIDE 1 MG/ML
INJECTION INTRAMUSCULAR; INTRAVENOUS PRN
Status: DISCONTINUED | OUTPATIENT
Start: 2023-01-04 | End: 2023-01-04 | Stop reason: SDUPTHER

## 2023-01-04 RX ORDER — DEXAMETHASONE SODIUM PHOSPHATE 4 MG/ML
INJECTION, SOLUTION INTRA-ARTICULAR; INTRALESIONAL; INTRAMUSCULAR; INTRAVENOUS; SOFT TISSUE PRN
Status: DISCONTINUED | OUTPATIENT
Start: 2023-01-04 | End: 2023-01-04 | Stop reason: SDUPTHER

## 2023-01-04 RX ADMIN — SODIUM CHLORIDE: 9 INJECTION, SOLUTION INTRAVENOUS at 11:59

## 2023-01-04 RX ADMIN — MIDAZOLAM 2 MG: 1 INJECTION INTRAMUSCULAR; INTRAVENOUS at 10:03

## 2023-01-04 RX ADMIN — PROPOFOL 100 MCG/KG/MIN: 10 INJECTION, EMULSION INTRAVENOUS at 10:03

## 2023-01-04 RX ADMIN — DEXAMETHASONE SODIUM PHOSPHATE 4 MG: 4 INJECTION, SOLUTION INTRAMUSCULAR; INTRAVENOUS at 10:03

## 2023-01-04 RX ADMIN — SODIUM CHLORIDE, POTASSIUM CHLORIDE, SODIUM LACTATE AND CALCIUM CHLORIDE: 600; 310; 30; 20 INJECTION, SOLUTION INTRAVENOUS at 09:18

## 2023-01-04 RX ADMIN — PROPOFOL 50 MG: 10 INJECTION, EMULSION INTRAVENOUS at 10:06

## 2023-01-04 RX ADMIN — PROPOFOL 40 MG: 10 INJECTION, EMULSION INTRAVENOUS at 10:08

## 2023-01-04 RX ADMIN — GABAPENTIN 300 MG: 300 CAPSULE ORAL at 07:58

## 2023-01-04 RX ADMIN — LIDOCAINE HYDROCHLORIDE 1 ML: 10 INJECTION, SOLUTION EPIDURAL; INFILTRATION; INTRACAUDAL; PERINEURAL at 09:19

## 2023-01-04 RX ADMIN — FENTANYL CITRATE 100 MCG: 50 INJECTION, SOLUTION INTRAMUSCULAR; INTRAVENOUS at 10:03

## 2023-01-04 RX ADMIN — CEFAZOLIN 2000 MG: 10 INJECTION, POWDER, FOR SOLUTION INTRAVENOUS at 10:02

## 2023-01-04 RX ADMIN — PROPOFOL 158 MG: 10 INJECTION, EMULSION INTRAVENOUS at 11:55

## 2023-01-04 RX ADMIN — ACETAMINOPHEN 1000 MG: 500 TABLET ORAL at 07:58

## 2023-01-04 RX ADMIN — ONDANSETRON 4 MG: 2 INJECTION INTRAMUSCULAR; INTRAVENOUS at 10:03

## 2023-01-04 RX ADMIN — LIDOCAINE HYDROCHLORIDE 40 MG: 20 INJECTION, SOLUTION EPIDURAL; INFILTRATION; INTRACAUDAL; PERINEURAL at 10:03

## 2023-01-04 ASSESSMENT — PAIN DESCRIPTION - ORIENTATION: ORIENTATION: LEFT

## 2023-01-04 ASSESSMENT — PAIN SCALES - GENERAL: PAINLEVEL_OUTOF10: 1

## 2023-01-04 ASSESSMENT — PAIN DESCRIPTION - DESCRIPTORS: DESCRIPTORS: ACHING

## 2023-01-04 ASSESSMENT — PAIN - FUNCTIONAL ASSESSMENT: PAIN_FUNCTIONAL_ASSESSMENT: 0-10

## 2023-01-04 ASSESSMENT — PAIN DESCRIPTION - LOCATION: LOCATION: FOOT

## 2023-01-04 ASSESSMENT — PAIN DESCRIPTION - PAIN TYPE: TYPE: SURGICAL PAIN

## 2023-01-04 NOTE — BRIEF OP NOTE
Brief Postoperative Note      Patient: Edson Hyman  YOB: 1980  MRN: [id number]    Date of Procedure: 1/4/2023    Pre-Op Diagnosis: HALLUX ABDUCTO VALGUS LEFT    Post-Op Diagnosis: Same       Procedure(s):  LAPIDUS FOOT BUNIONECTOMY WITH LATERAL RELEASE    Surgeon(s):  Delilah Higgins DPM    Assistant:  Resident: Emir Robb DPM    Anesthesia: Monitor Anesthesia Care 15 cc 0.5% marcaine plain    Estimated Blood Loss (mL): Minimal    Complications: None    Specimens:   * No specimens in log *    Implants:  Implant Name Type Inv.  Item Serial No.  Lot No. LRB No. Used Action   SCREW HEADLESS COMP [id number] - [id number]  SCREW HEADLESS COMP [id number]  [employer] [facility]-  Left 1 Implanted   SCREW HEADLESS COMP [id number] - [id number]  SCREW HEADLESS COMP [id number]  [employer] [facility]  Left 1 Implanted         Drains: * No LDAs found *    Findings: 1-2 IM angle reduced, HAV reduced, fixation crossing compressive screws    Electronically signed by Emir Robb DPM on 1/4/2023 at 12:20 PM

## 2023-01-04 NOTE — DISCHARGE INSTRUCTIONS
Podiatric Post Operative Instructions: You are being discharged following Lapidus procedure on your left lower extremity. Fluids and Diet:  Lean proteins and vegetables should be the backbone of your diet to aid in the healing process  Reduce salt intake to assist with decreased fluid retention which reduces the swelling to your affected extremity     Medications: Take your prescriptions as directed  You are receiving new prescriptions for pain, antibiotics, crutches. If your pain is not severe then you may take the non-prescription medication that you normally take for aches and pains  You may resume your regularly scheduled medications (unless otherwise directed)  If any side effects or adverse reactions occur, discontinue the medication and contact your doctor. Review the patient drug information that is provided before you take any medication    Ambulation and Activity:  You are advised to go directly home from the hospital  Use assistive devices to ambulate at all times (for walking, moving around - be very careful to avoid weight on your injured extremity). You may not put weight on the operated foot. Avoid stairs if possible. Do not lift or move heavy objects  Do not drive until cleared by your physician    Bandage and Wound Care Instructions:  Keep bandage clean and dry  Do not shower or bathe the operative extremity  Do not remove the bandage  Do not attempt to put anything between the dressing and your skin, some itching is normal.    Ice and Elevation:  Elevate operative extremity as much as possible to reduce swelling and discomfort. Elevate with 2 pillows at or above the level of the heart for the first 72 hours and whenever possible to reduce swelling. Ice:  Apply ice to the back of the knee of the affected extremity for 20 minutes of every 2 hours while awake for the first 72 hours. You may ice the bandage as well.     Special Instructions: Call your doctor immediately if you develop any of the following. Fever over 100.4 degrees Fahrenheit by mouth - take your temperature daily until your first follow up visit. Pain not relieved by medication ordered  Swelling, increased redness, warmth, or hardness around operative area. Numb, tingling or cold toes. Toe(s) become white or bluish  Bandage becomes wet, soiled, or blood soaked (small amount of bleeding may be normal)  Increased or progressive drainage from surgical area. Follow up instructions: You will need to follow up with Dr. Ruby Hightower DPM within one week. Call when you get home to schedule or confirm your appointment. Call [facility] office if you have any questions or concerns.

## 2023-01-04 NOTE — ANESTHESIA POSTPROCEDURE EVALUATION
Department of Anesthesiology  Postprocedure Note    Patient: Ashley Jeong  MRN: [id number]  YOB: 1980  Date of evaluation: 1/4/2023      Procedure Summary     Date: 01/04/23 Room / Location: [address] [place] [address] Luan Grant 03 / [facility]: [place]    Anesthesia Start: 0957 Anesthesia Stop: 1716    Procedure: LAPIDUS FOOT BUNIONECTOMY WITH LATERAL RELEASE (Left: Foot) Diagnosis:       Hallux abducto valgus, left      (HALLUX ABDUCTO VALGUS LEFT)    Surgeons: Maliha Gagnon DPM Responsible Provider: Riddhi Abraham MD    Anesthesia Type: MAC ASA Status: 2          Anesthesia Type: MAC    Layla Phase I: Layla Score: 10    Layla Phase II: Lyala Score: 10      Anesthesia Post Evaluation    Comments: POST- ANESTHESIA EVALUATION       Pt Name: Ashley Jeong  MRN: [id number]  YOB: 1980  Date of evaluation: 1/4/2023  Time:  1:10 PM      BP (!) 149/92   Pulse 64   Temp 97.8 °F (36.6 °C) (Temporal)   Resp 16   Ht 5' 9\" (1.753 m)   Wt 209 lb (94.8 kg)   LMP  (LMP Unknown)   SpO2 100%   BMI 30.86 kg/m²      Consciousness Level  Awake  Cardiopulmonary Status  Stable  Pain Adequately Treated YES  Nausea / Vomiting  NO  Adequate Hydration  YES  Anesthesia Related Complications NONE      Electronically signed by Riddhi Abraham MD on 1/4/2023 at 1:10 PM

## 2023-01-04 NOTE — ANESTHESIA PRE PROCEDURE
Department of Anesthesiology  Preprocedure Note       Name:  Madelin Agosto   Age:  38 y.o.  :  1980                                          MRN:  [id number]         Date:  2023      Surgeon: Aguila Rodriguez):  Brian Lorenz DPM    Procedure: Procedure(s):  LAPIDUS FOOT BUNIONECTOMY    Medications prior to admission:   Prior to Admission medications    Medication Sig Start Date End Date Taking? Authorizing Provider   mirabegron (MYRBETRIQ) 50 MG TB24 Take by mouth    Historical Provider, MD       Current medications:    Current Facility-Administered Medications   Medication Dose Route Frequency Provider Last Rate Last Admin    lidocaine PF 1 % injection 1 mL  1 mL IntraDERmal Once PRN Fran Strong MD        lactated ringers infusion   IntraVENous Continuous Fran Strong MD        sodium chloride flush 0.9 % injection 5-40 mL  5-40 mL IntraVENous 2 times per day Fran Strong MD        sodium chloride flush 0.9 % injection 5-40 mL  5-40 mL IntraVENous PRN Fran Strong MD        0.9 % sodium chloride infusion   IntraVENous PRN Fran Strong MD        ceFAZolin (ANCEF) 2000 mg in dextrose 5 % 50 mL IVPB  2,000 mg IntraVENous Once Brian Lorenz DPM           Allergies: Allergies   Allergen Reactions    Heparin Anaphylaxis    Codeine Hives       Problem List:    Patient Active Problem List   Diagnosis Code    Asthma  O99.519, J45.909    Recurrent early pregnancy loss O26.20    Homozygous MTHFR mutation [id number] Z15.89    Abnormal uterine bleeding (AUB) N93.9    Pelvic pain R10.2    Dysmenorrhea N94.6    Dyspareunia in female N94.10    S/P hysterectomy Z90.710    S/P laparoscopy Z98.890    Preop examination Z01.818       Past Medical History:        Diagnosis Date    Abnormal pap     \" thought it was due to yeast infection\"    Allergic     heparin    Anemia     briefly RT bleeding    Asthma     Difficult intravenous access     May need PICC team for access.    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       Past Surgical History:        Procedure Laterality Date   [address]    laparoscopy--also 2 left one right ovarian cystectomies    CYST REMOVAL Bilateral     two cyst on left one on right ovaries    DILATION AND CURETTAGE OF UTERUS  14    DILATION AND CURETTAGE OF UTERUS  2017    HYSTERECTOMY ABDOMINAL N/A 2019    XI LAPAROSCOPIC ROBOTIC ABDOMINAL HYSTERECTOMY, BILATERAL SALPINGECTOMY, LEFT OVARIAN CYSTECTOMY, CYSTOSCOPY performed by Kate Feldman MD at [address]  2014    exploratory laparoscopy    LASIK Bilateral 2013    OTHER SURGICAL HISTORY Bilateral 2019    XI LAPAROSCOPIC ROBOTIC ABDOMINAL HYSTERECTOMY, BILATERAL SALPINGECTOMY, LEFT OVARIAN CYSTECTOMY, CYSTOSCOPY    HI INDUCED  DILATION & EVACUATION N/A 2017    DILATATION AND CURETTAGE SUCTION performed by Rylee Garcia MD at [address]  2000       Social History:    Social History     Tobacco Use    Smoking status: Never    Smokeless tobacco: Never   Substance Use Topics    Alcohol use:  Yes     Alcohol/week: 0.0 standard drinks     Comment: rarely                                Counseling given: Not Answered      Vital Signs (Current):   Vitals:    23 0738   BP: (!) 150/94   Pulse: 77   Resp: 16   Temp: 98.9 °F (37.2 °C)   TempSrc: Infrared   SpO2: 98%   Weight: 209 lb (94.8 kg)   Height: 5' 9\" (1.753 m)                                              BP Readings from Last 3 Encounters:   23 (!) 150/94 12/21/22 (!) 156/96   08/07/19 123/82       NPO Status: Time of last liquid consumption: 2200                        Time of last solid consumption: 2200                        Date of last liquid consumption: 01/03/23                        Date of last solid food consumption: 01/03/23    BMI:   Wt Readings from Last 3 Encounters:   01/04/23 209 lb (94.8 kg)   12/21/22 209 lb (94.8 kg)   12/06/22 209 lb (94.8 kg)     Body mass index is 30.86 kg/m². CBC:   Lab Results   Component Value Date/Time    WBC 9.1 12/21/2022 02:15 PM    RBC 4.77 12/21/2022 02:15 PM    HGB 13.5 12/21/2022 02:15 PM    HCT 40.3 12/21/2022 02:15 PM    MCV 84.6 12/21/2022 02:15 PM    RDW 13.4 12/21/2022 02:15 PM     12/21/2022 02:15 PM     12/08/2015 12:00 AM       CMP:   Lab Results   Component Value Date/Time     12/21/2022 02:15 PM    K 4.0 12/21/2022 02:15 PM     12/21/2022 02:15 PM    CO2 22 12/21/2022 02:15 PM    BUN 15 12/21/2022 02:15 PM    CREATININE 0.67 12/21/2022 02:15 PM    GFRAA >60 06/29/2016 09:02 AM    LABGLOM >60 12/21/2022 02:15 PM    GLUCOSE 91 12/21/2022 02:15 PM    PROT 5.7 06/29/2016 09:02 AM    CALCIUM 9.1 12/21/2022 02:15 PM    BILITOT 0.15 06/29/2016 09:02 AM    ALKPHOS 132 06/29/2016 09:02 AM    AST 26 06/29/2016 09:02 AM    ALT 34 06/29/2016 09:02 AM       POC Tests: No results for input(s): POCGLU, POCNA, POCK, POCCL, POCBUN, POCHEMO, POCHCT in the last 72 hours.     Coags:   Lab Results   Component Value Date/Time    PROTIME 9.5 06/27/2016 06:09 PM    INR 0.9 06/27/2016 06:09 PM    APTT 21.0 06/27/2016 06:09 PM       HCG (If Applicable):   Lab Results   Component Value Date    PREGTESTUR NEGATIVE 06/11/2019    HCG NEGATIVE 06/14/2019    HCGQUANT 174 (H) 07/23/2017        ABGs: No results found for: PHART, PO2ART, LJT8XYG, OKZ0ZZD, BEART, S3MTYRSA     Type & Screen (If Applicable):  No results found for: LABABO, LABRH    Drug/Infectious Status (If Applicable):  No results found for: HIV, HEPCAB    COVID-19 Screening (If Applicable): No results found for: COVID19        Anesthesia Evaluation  Patient summary reviewed and Nursing notes reviewed no history of anesthetic complications:   Airway: Mallampati: II  TM distance: >3 FB   Neck ROM: full  Mouth opening: > = 3 FB   Dental: normal exam         Pulmonary:normal exam  breath sounds clear to auscultation  (+) asthma:                            Cardiovascular:    (+) hypertension:,         Rhythm: regular  Rate: normal                    Neuro/Psych:   Negative Neuro/Psych ROS              GI/Hepatic/Renal: Neg GI/Hepatic/Renal ROS            Endo/Other:                      ROS comment: Hallux abducto valgus, left  Abdominal:             Vascular: negative vascular ROS. Other Findings:           Anesthesia Plan      general     ASA 2       Induction: intravenous. MIPS: Postoperative opioids intended and Prophylactic antiemetics administered. Anesthetic plan and risks discussed with patient. Plan discussed with CRNA.                     Gisela Calero MD   1/4/2023

## 2023-01-04 NOTE — INTERVAL H&P NOTE
Update History & Physical    The patient's History and Physical of December 21, 2022 was reviewed with the patient and I examined the patient. There was no change. The surgical site was confirmed by the patient and me. Pt undergoing for LAPIDUS FOOT BUNIONECTOMY per Dr. Sloane Macedo . Pt denies fever/chills, chest pain or SOB  Pt NPO since the past midnight , no am medication today   Denies hx of MRSA infection. Pt has hx of blood clots small clot behind knee after surgery in 1996 surgeries since w/o problem   Pt denies taking any blood thinner  Denies hx of any personal or family hx of complications w/anesthesia. Physical exam remains unchanged including cardiac and pulmonary assessment   See nursing flow sheet for vital signs       Lab Results   Component Value Date    WBC 9.1 12/21/2022    HGB 13.5 12/21/2022    HCT 40.3 12/21/2022    MCV 84.6 12/21/2022     (H) 12/21/2022     Lab Results   Component Value Date/Time     12/21/2022 02:15 PM    K 4.0 12/21/2022 02:15 PM     12/21/2022 02:15 PM    CO2 22 12/21/2022 02:15 PM    BUN 15 12/21/2022 02:15 PM    CREATININE 0.67 12/21/2022 02:15 PM    GLUCOSE 91 12/21/2022 02:15 PM    CALCIUM 9.1 12/21/2022 02:15 PM        Plan: The risks, benefits, expected outcome, and alternative to the recommended procedure have been discussed with the patient. Patient understands and wants to proceed with the procedure.      Electronically signed by CHERELLE Figueredo CNP on 1/4/2023 at 7:31 AM

## 2023-01-04 NOTE — OP NOTE
Operative Note      Patient: Augustina Louis  YOB: 1980  MRN: [id number]    Date of Procedure: 1/4/2023    Pre-Op Diagnosis: HALLUX ABDUCTO VALGUS LEFT with hypermobility of 1st ray  Pain left foot    Post-Op Diagnosis: Same       Procedure(s):  LAPIDUS FOOT BUNIONECTOMY WITH LATERAL RELEASE    Surgeon(s):  Sebastián Mae DPM    Assistant:  Resident: Keo Collazo DPM    Anesthesia: Monitor Anesthesia Care 15 cc 0.5% marcaine plain    Estimated Blood Loss (mL): Minimal    Complications: None    Specimens:   * No specimens in log *    Implants:  Implant Name Type Inv. Item Serial No.  Lot No. LRB No. Used Action   SCREW HEADLESS COMP [id number] - [id number]  SCREW HEADLESS COMP [id number]  [employer]-  Left 1 Implanted   SCREW HEADLESS COMP [id number] - [id number]  SCREW HEADLESS COMP [id number]  [employer]-  Left 1 Implanted         Drains: * No LDAs found *    Findings: 1-2 IM angle reduced, HAV reduced, fixation crossing compressive screws    INDICATIONS FOR PROCEDURE: Patient is a 38year old female who has had multiple progressive complaints of the HAV and left foot pain foot for several years. 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 Preoperative radiographs of the left foot were obtained which demonstrated, increased intermetatarsal angle and severe hallux valgus deformity. Pain has progressed to the point of limiting ambulation, difficulty in shoe gear, and negatively affecting daily activity. Patient has failed numerous conservative treatments including anti-inflammatory medication, offloading padding and inserts, shoe gear modifications, physical therapy, lifestyle modifications and elects to undergo surgical correction.  We once again discussed surgical intervention including lapidus procedure of left foot including all the risks, benefits, alternatives. We recommend surgical intervention to aid in improving function, pain, activities of daily living, quality of life, as well to help correct deformity and to restore anatomic alignment. All questions were answered to the patient's satisfaction. She verbalizes and demonstrates understanding. No guarantees were implied or given. She provided written informed consent which was signed and placed in the chart. NPO status confirmed. PROCEDURE IN DETAIL: Under mild sedation the patient was transported from pre-op to the operating room and placed on the operating table in the supine position with a safety strap across the lap. A well-padded pneumatic tourniquet was applied to the left ankle. After adequate sedation by anesthesia a local block of 15 cc 0.5% marcaine plain was given. The foot was then prepped, scrubbed, and draped in the usual aseptic fashion. A timeout was performed confirming correct patient, correct surgical site, correct procedure, antibiotics, everyone in the room was in agreement. Then the Esmarch bandage was used to exsanguinate the left foot. The pneumatic ankle tourniquet was inflated to 250 mmHg and wound remained inflated for approximately 103 minutes throughout the procedure. Attention was directed to the dorsal medial aspect of the midtarsal joint of the left foot. Linear incision was then made over the dorsal medial aspect of the first tarsometatarsal joint utilizing a #15 blade down to the 1st MPJ. Incision was then deepened to the level of capsule utilizing a combination of sharp and blunt dissection. Care was taken to protect and retract all neurovascular structures. The saphenous nerve was not identified. Any bleeding vessels were cauterized via Bovie electrocautery.   Once adequate exposure of the capsule was achieved a linear capsular incision was made over the dorsal medial aspect of the first tarsometatarsal joint and 1st MPJ where the medial eminence was resected using a sagittal saw, removed, flushed and passed to the back table. This incision allowed for adequate access and visualization to both the first tarsometatarsal joint. At this time the first tarsometatarsal joint was then prepped for arthrodesis all intervening soft tissue and cartilage were removed until subchondral bone was noted. The arthrodesis sites were then fenestrated with use of a 2.0 drill and osteotome in hopes of creating more osteoprogenitor cells to the site. The arthrodesis sites were then temporarily fixated with use of provisional K wires and reduction clamps. Then per 's instructions utilizing [address] [place] [address] jig the first ray was reduced into anatomic alignment and held in place with the jig. This did require an ancillary incision along the lateral aspect of the second metatarsal head which was made utilizing a 15 blade and deepened to the level of bone with blunt dissection. Adequate reduction and alignment was confirmed utilizing intraoperative fluoroscopy in multiple views. This time we move forward with fixation of our arthrodesis sites with use of a 4.0 mm partially-threaded lag screw which was inserted following AO principles. To further stabilize our construct we proceeded with fixation of an additional crossing 4.0 mm partially-threaded lag screw using AO techinque. All provisional fixation was removed at this time. Intraoperative fluoroscopy was again utilized to confirm appropriate and safe hardware placement. A medial capsulorraphy was performed using 2-0 ethibond and the hallux was noted to be maintained in rectus alignment. The surgical sites were then irrigated with copious amounts of sterile saline. The surgical site was then closed in layers. Capsular closure was performed utilizing 2-0 Vicryl, subcutaneous closure was performed utilizing 4-0 vicryl, skin closure performed utilizing 4-0 nylon.       Dressings consisting of Adaptic, gauze 4 x 4's, Kerlix, ACE were then applied. The pneumatic ankle tourniquet was released and immediate hyperemic flush was noted to all five digits of the left foot. Crutches and a surgical shoe were prescribed. The patient tolerated the above procedure and anesthesia well without complications. The patient was transported from the operating room to the PACU with vital signs stable and vascular status intact to the foot.       Electronically signed by Emir Robb DPM on 1/4/2023 at 12:20 PM

## 2023-01-05 DIAGNOSIS — M20.12 HALLUX VALGUS, LEFT: ICD-10-CM

## 2023-01-05 DIAGNOSIS — R26.2 PATIENT UNABLE TO WALK 150 FEET: Primary | ICD-10-CM

## 2023-01-05 NOTE — TELEPHONE ENCOUNTER
Patient called for a prescription for a handicap placard and a knee scooter. Pended if appropriate, please advise.

## 2023-01-09 ENCOUNTER — OFFICE VISIT (OUTPATIENT)
Dept: PODIATRY | Age: 43
End: 2023-01-09

## 2023-01-09 VITALS — HEIGHT: 69 IN | BODY MASS INDEX: 30.96 KG/M2 | WEIGHT: 209 LBS

## 2023-01-09 DIAGNOSIS — R60.0 EDEMA, LOWER EXTREMITY: ICD-10-CM

## 2023-01-09 DIAGNOSIS — M79.672 PAIN IN LEFT FOOT: ICD-10-CM

## 2023-01-09 DIAGNOSIS — Z98.890 POST-OPERATIVE STATE: ICD-10-CM

## 2023-01-09 DIAGNOSIS — M20.12 HALLUX VALGUS, LEFT: Primary | ICD-10-CM

## 2023-01-09 PROBLEM — M24.576: Status: ACTIVE | Noted: 2023-01-09

## 2023-01-09 PROCEDURE — 99024 POSTOP FOLLOW-UP VISIT: CPT | Performed by: PODIATRIST

## 2023-01-09 RX ORDER — IBUPROFEN 600 MG/1
600 TABLET ORAL EVERY 6 HOURS PRN
COMMUNITY

## 2023-01-09 RX ORDER — OXYCODONE HYDROCHLORIDE AND ACETAMINOPHEN 5; 325 MG/1; MG/1
1 TABLET ORAL EVERY 4 HOURS PRN
Qty: 30 TABLET | Refills: 0 | Status: SHIPPED | OUTPATIENT
Start: 2023-01-09 | End: 2023-01-16

## 2023-01-09 ASSESSMENT — ENCOUNTER SYMPTOMS
SHORTNESS OF BREATH: 0
DIARRHEA: 0
BACK PAIN: 0
NAUSEA: 0
COLOR CHANGE: 0

## 2023-01-09 NOTE — PROGRESS NOTES
Subjective: Patient presents to the office today status-post lapidus bunionectomy to the left foot. Patient states they have kept the dressing to the left lower extremity clean, dry, and intact since the surgery. Patient states that she has remained NWB to the LLE as instructed since her surgery. Patient states that their pain has been well-controlled with the prescribed pain medication. However, patient states that she is out of pain medication and is requesting a refill. Patient is also requesting a prescription for ibuprofen. Patient states that they have been taking their oral antibiotics as prescribed. Patient denies any fever, chills, nausea, vomiting, or night sweats. Review of Systems   Constitutional:  Negative for activity change, appetite change, chills, diaphoresis, fatigue and fever. Respiratory:  Negative for shortness of breath. Cardiovascular:  Negative for leg swelling. Gastrointestinal:  Negative for diarrhea and nausea. Endocrine: Negative for cold intolerance, heat intolerance and polyuria. Musculoskeletal:  Positive for gait problem and joint swelling. Negative for arthralgias, back pain and myalgias. Skin:  Positive for wound. Negative for color change, pallor and rash. Allergic/Immunologic: Negative for environmental allergies and food allergies. Neurological:  Negative for dizziness, weakness, light-headedness and numbness. Hematological:  Does not bruise/bleed easily. Psychiatric/Behavioral:  Negative for behavioral problems, confusion and self-injury. The patient is not nervous/anxious. Objective: Clinical evaluation of the patient reveals dressing to the left lower extremity to be clean, dry, and intact. Removal of the dressing reveals incision to be well-coapted and the sutures intact. There is erythema surrounding the incision site, but it does not extend beyond this area. There is no calor, drainage, or malodor noted to the surgical site.  There is moderate non-pitting edema present to the left lower extremity. Clinically there is adequate alignment noted to the left first ray. X-ray's taken: AP, Lateral, and Medial Oblique of the left foot. Findings: There is adequate reduction of bunion deformity to the first metatarsal with intact internal screw fixation. Assessment:    Diagnosis Orders   1. Hallux valgus, left  XR FOOT LEFT (MIN 3 VIEWS)    oxyCODONE-acetaminophen (PERCOCET) 5-325 MG per tablet      2. Edema, lower extremity  XR FOOT LEFT (MIN 3 VIEWS)    oxyCODONE-acetaminophen (PERCOCET) 5-325 MG per tablet      3. Pain in left foot  XR FOOT LEFT (MIN 3 VIEWS)    oxyCODONE-acetaminophen (PERCOCET) 5-325 MG per tablet      4. Post-operative state  XR FOOT LEFT (MIN 3 VIEWS)    oxyCODONE-acetaminophen (PERCOCET) 5-325 MG per tablet        Plan: Antibiotic ointment was placed along the incision and a dry sterile dressing was applied. The patient was instructed to keep the dressing clean, dry, and intact until the next visit. Patient to remain NWB to the LLE at all times. Patient given a prescription for Percocet. Patient informed that I don't like to give an antiinflammatory as this could inhibit bone healing. Patient states that she has already been taking ibuprofen. Patient advised to stop taking this medication at this time. The patient is to follow up in the office as previously scheduled for likely suture removal. Return in about 11 days (around 1/20/2023) for Second postoperative evaluation.    1/9/2023      Constantin Greene DPM

## 2023-01-20 ENCOUNTER — OFFICE VISIT (OUTPATIENT)
Dept: PODIATRY | Age: 43
End: 2023-01-20

## 2023-01-20 VITALS — BODY MASS INDEX: 30.96 KG/M2 | WEIGHT: 209 LBS | HEIGHT: 69 IN

## 2023-01-20 DIAGNOSIS — R60.0 EDEMA, LOWER EXTREMITY: ICD-10-CM

## 2023-01-20 DIAGNOSIS — M79.672 PAIN IN LEFT FOOT: ICD-10-CM

## 2023-01-20 DIAGNOSIS — M20.12 HALLUX VALGUS, LEFT: Primary | ICD-10-CM

## 2023-01-20 DIAGNOSIS — Z98.890 POST-OPERATIVE STATE: ICD-10-CM

## 2023-01-20 PROBLEM — Z01.818 PREOP EXAMINATION: Status: RESOLVED | Noted: 2022-12-21 | Resolved: 2023-01-20

## 2023-01-20 ASSESSMENT — ENCOUNTER SYMPTOMS
SHORTNESS OF BREATH: 0
NAUSEA: 0
BACK PAIN: 0
COLOR CHANGE: 0
DIARRHEA: 0

## 2023-01-20 NOTE — PROGRESS NOTES
Subjective: Patient presents to the office today for their second post-operative evaluation of Lapidus bunionectomy to the left foot. Patient states that they have kept the dressing to the left lower extremity clean, dry, and intact since last visit. Patient states that she has remained NWB to the LLE as instructed since last visit. Patient states that their pain continues to be well controlled with the prescribed pain medication. Review of Systems   Constitutional:  Negative for activity change, appetite change, chills, diaphoresis, fatigue and fever. Respiratory:  Negative for shortness of breath. Cardiovascular:  Negative for leg swelling. Gastrointestinal:  Negative for diarrhea and nausea. Endocrine: Negative for cold intolerance, heat intolerance and polyuria. Musculoskeletal:  Positive for gait problem and joint swelling. Negative for arthralgias, back pain and myalgias. Skin:  Positive for wound. Negative for color change, pallor and rash. Allergic/Immunologic: Negative for environmental allergies and food allergies. Neurological:  Negative for dizziness, weakness, light-headedness and numbness. Hematological:  Does not bruise/bleed easily. Psychiatric/Behavioral:  Negative for behavioral problems, confusion and self-injury. The patient is not nervous/anxious. Objective: Clinical evaluation of the patient reveals dressing to the left lower extremity to be clean, dry, and intact. Removal of the dressing reveals incision to be well co-apted and the sutures intact. Tension placed along the incision line does not produce any gapping. There is mild erythema remaining around the incision site, but it does not extend beyond this area. There is no calor, drainage, or malodor noted to the surgical site. There remains mild nonpitting edema to the left lower extremity. Clinically, there remains adequate alignment to the left first ray.  The sutures were removed and tension was once again placed along the incision line. Again, no gapping was observed. However, there is a superficial wound present to the incision site. There are no signs of bacterial infection noted to the area. Assessment:    Diagnosis Orders   1. Hallux valgus, left  SC PNEUMAT WALKING BOOT PRE CST      2. Edema, lower extremity  SC PNEUMAT WALKING BOOT PRE CST      3. Pain in left foot  SC PNEUMAT WALKING BOOT PRE CST      4. Post-operative state  SC PNEUMAT WALKING BOOT PRE CST        Plan: The left foot was dressed with antibiotic ointment and a band aid. The patient was advised that they may begin bathing their left lower extremity in one week. However, they are cautioned against any aggressive scrubbing of the incision site as this may reopen the wound. I have dispensed a short pneumatic equalizer walker to the patient's left lower extremity. Due to the patient's diagnosis and related symptoms this is medically necessary for the treatment. The function of this device is to restrict and limit motion and provide stabilization and compression to the affected area. Specific instructions on weight bearing and ROM exercises were discussed and given to the patient. Patient to wear this boot at all times and is to remain strictly NWB to the LLE. The goals and function of this device were explained in detail to the patient. Upon dispensing, the device appeared to be fitting well and the patient states that the device is comfortable at this time. The patient was shown how to properly apply, wear, and care for the device. The patient was able to properly apply the device and ambulate with it without distress. At the time that the device was dispensed it was suitable for the patient's condition and was not substandard. No guarantees were given or implied and the precautions of the device were reviewed the patient.  Written instructions and warranty information was given along with the list of the twenty-one (21) Durable Medical Equipment Supplier Guidelines. All patient questions were answered satisfactorily. Return in about 4 weeks (around 2/17/2023) for third post op left foot with xrays. Patient will have xrays taken on this next visit.     Rae Yao DPM

## 2023-02-17 ENCOUNTER — OFFICE VISIT (OUTPATIENT)
Dept: PODIATRY | Age: 43
End: 2023-02-17

## 2023-02-17 VITALS — HEIGHT: 69 IN | WEIGHT: 209 LBS | BODY MASS INDEX: 30.96 KG/M2

## 2023-02-17 DIAGNOSIS — Z98.890 POST-OPERATIVE STATE: ICD-10-CM

## 2023-02-17 DIAGNOSIS — R60.0 EDEMA, LOWER EXTREMITY: ICD-10-CM

## 2023-02-17 DIAGNOSIS — M20.12 HALLUX VALGUS, LEFT: Primary | ICD-10-CM

## 2023-02-17 DIAGNOSIS — M79.672 PAIN IN LEFT FOOT: ICD-10-CM

## 2023-02-17 PROCEDURE — 99024 POSTOP FOLLOW-UP VISIT: CPT | Performed by: PODIATRIST

## 2023-02-17 NOTE — PROGRESS NOTES
[facility]  Return Patient Progress Note    Subjective: Chelita Ariza 38 y.o. female that presents for follow up evaluation of Lapidus bunionectomy to the left foot. Chief Complaint   Patient presents with    Post-Op Check     Post op left foot, doing good     Patient's treatment thus far has included NWB with crutch assist and immobilization in a CAM walker, as well as Xarelto for DVT prophylaxis. Pain is rated 2 out of 10 and is described as intermittent. Patient has been following my prescribed course of therapy as instructed. Patient is requesting that the right foot also be corrected in the same manner. Review of Systems   Constitutional:  Negative for activity change, appetite change, chills, diaphoresis, fatigue and fever. Respiratory:  Negative for shortness of breath. Cardiovascular:  Negative for leg swelling. Gastrointestinal:  Negative for diarrhea and nausea. Endocrine: Negative for cold intolerance, heat intolerance and polyuria. Musculoskeletal:  Positive for gait problem and joint swelling. Negative for arthralgias, back pain and myalgias. Skin:  Negative for color change, pallor, rash and wound. Allergic/Immunologic: Negative for environmental allergies and food allergies. Neurological:  Negative for dizziness, weakness, light-headedness and numbness. Hematological:  Does not bruise/bleed easily. Psychiatric/Behavioral:  Negative for behavioral problems, confusion and self-injury. The patient is not nervous/anxious. Objective: Clinical evaluation of the patient reveals some abduction of the hallux at the MTPJ of the left foot, but this is flexible. There is only mild pain with reduction of this deformity. There remains moderate edema to the right foot. The surgical incision remains adequately healed. There is no erythema or calor noted to the left foot. There is no motion to the left first metatarsal cuneiform joint.  There is mild pain with attempt at this range of motion. There is limitation with range of motion to the left first MTPJ upon dorsiflexion. There is pain with this range of motion. X-ray's taken: AP, Lateral, and Medial Oblique of the left foot. Findings: There is adequate osseous union of the first metatarsal cuneiform joint with intact internal fixation. Assessment:    Diagnosis Orders   1. Hallux valgus, left  XR FOOT LEFT (MIN 3 VIEWS)      2. Edema, lower extremity  XR FOOT LEFT (MIN 3 VIEWS)      3. Pain in left foot  XR FOOT LEFT (MIN 3 VIEWS)      4. Post-operative state  XR FOOT LEFT (MIN 3 VIEWS)            Plan: 1. Clinical evaluation of the patient. 2. Patient informed that she has adequately healed and she may resume full weightbearing in a regular shoe at this time. Patient will be seen in 2 weeks for evaluation. Patient is to schedule a date for surgery to the right foot consisting of a Lapidus bunionectomy. 3. Return in about 2 weeks (around 3/3/2023) for post op left foot.    2/17/2023      Jluis Amaya DPM

## 2023-02-20 ASSESSMENT — ENCOUNTER SYMPTOMS
COLOR CHANGE: 0
BACK PAIN: 0
NAUSEA: 0
DIARRHEA: 0
SHORTNESS OF BREATH: 0

## 2023-02-23 ENCOUNTER — TELEPHONE (OUTPATIENT)
Dept: PODIATRY | Age: 43
End: 2023-02-23

## 2023-02-23 DIAGNOSIS — M20.12 HALLUX VALGUS, LEFT: Primary | ICD-10-CM

## 2023-02-23 DIAGNOSIS — R60.0 EDEMA, LOWER EXTREMITY: ICD-10-CM

## 2023-02-23 DIAGNOSIS — M79.672 PAIN IN LEFT FOOT: ICD-10-CM

## 2023-02-23 DIAGNOSIS — Z98.890 POST-OPERATIVE STATE: ICD-10-CM

## 2023-02-23 NOTE — TELEPHONE ENCOUNTER
Patient called because she was told at her last appointment if her ROM did not improve we could send her to PT. Her next appointment is 3/3 but she wants to know if we can order it for her and she can start it now before that appointment. She said she cannot move her toe at all.

## 2023-02-28 ENCOUNTER — OFFICE VISIT (OUTPATIENT)
Dept: PRIMARY CARE CLINIC | Age: 43
End: 2023-02-28
Payer: COMMERCIAL

## 2023-02-28 VITALS
HEART RATE: 90 BPM | SYSTOLIC BLOOD PRESSURE: 130 MMHG | DIASTOLIC BLOOD PRESSURE: 80 MMHG | WEIGHT: 211.2 LBS | RESPIRATION RATE: 16 BRPM | OXYGEN SATURATION: 100 % | BODY MASS INDEX: 31.19 KG/M2

## 2023-02-28 DIAGNOSIS — Z76.89 ENCOUNTER TO ESTABLISH CARE: Primary | ICD-10-CM

## 2023-02-28 DIAGNOSIS — Z13.29 SCREENING FOR THYROID DISORDER: ICD-10-CM

## 2023-02-28 DIAGNOSIS — J45.909 ASTHMA AFFECTING PREGNANCY, ANTEPARTUM: ICD-10-CM

## 2023-02-28 DIAGNOSIS — Z13.1 SCREENING FOR DIABETES MELLITUS: ICD-10-CM

## 2023-02-28 DIAGNOSIS — E53.8 VITAMIN B 12 DEFICIENCY: ICD-10-CM

## 2023-02-28 DIAGNOSIS — M20.12 HALLUX VALGUS, LEFT: ICD-10-CM

## 2023-02-28 DIAGNOSIS — M24.576 EXTENSION CONTRACTURE OF METATARSOPHALANGEAL (MTP) JOINT: ICD-10-CM

## 2023-02-28 DIAGNOSIS — D50.9 IRON DEFICIENCY ANEMIA, UNSPECIFIED IRON DEFICIENCY ANEMIA TYPE: ICD-10-CM

## 2023-02-28 DIAGNOSIS — O99.519 ASTHMA AFFECTING PREGNANCY, ANTEPARTUM: ICD-10-CM

## 2023-02-28 DIAGNOSIS — F41.9 ANXIETY: ICD-10-CM

## 2023-02-28 DIAGNOSIS — E55.9 VITAMIN D DEFICIENCY: ICD-10-CM

## 2023-02-28 DIAGNOSIS — Z13.6 ENCOUNTER FOR LIPID SCREENING FOR CARDIOVASCULAR DISEASE: ICD-10-CM

## 2023-02-28 DIAGNOSIS — Z11.3 SCREENING EXAMINATION FOR STD (SEXUALLY TRANSMITTED DISEASE): ICD-10-CM

## 2023-02-28 DIAGNOSIS — Z13.220 ENCOUNTER FOR LIPID SCREENING FOR CARDIOVASCULAR DISEASE: ICD-10-CM

## 2023-02-28 PROBLEM — N94.6 DYSMENORRHEA: Status: RESOLVED | Noted: 2019-04-09 | Resolved: 2023-02-28

## 2023-02-28 PROBLEM — N93.9 ABNORMAL UTERINE BLEEDING (AUB): Status: RESOLVED | Noted: 2019-04-09 | Resolved: 2023-02-28

## 2023-02-28 PROBLEM — N94.10 DYSPAREUNIA IN FEMALE: Status: RESOLVED | Noted: 2019-04-09 | Resolved: 2023-02-28

## 2023-02-28 PROBLEM — Z98.890 S/P LAPAROSCOPY: Status: RESOLVED | Noted: 2019-06-14 | Resolved: 2023-02-28

## 2023-02-28 PROBLEM — Z90.710 S/P HYSTERECTOMY: Status: RESOLVED | Noted: 2019-06-14 | Resolved: 2023-02-28

## 2023-02-28 PROBLEM — R10.2 PELVIC PAIN: Status: RESOLVED | Noted: 2019-04-09 | Resolved: 2023-02-28

## 2023-02-28 PROBLEM — M79.672 LEFT FOOT PAIN: Status: RESOLVED | Noted: 2023-01-09 | Resolved: 2023-02-28

## 2023-02-28 PROCEDURE — 99204 OFFICE O/P NEW MOD 45 MIN: CPT | Performed by: NURSE PRACTITIONER

## 2023-02-28 RX ORDER — HYDROXYZINE HYDROCHLORIDE 25 MG/1
25 TABLET, FILM COATED ORAL 2 TIMES DAILY PRN
Qty: 60 TABLET | Refills: 1 | Status: SHIPPED | OUTPATIENT
Start: 2023-02-28 | End: 2023-03-30

## 2023-02-28 RX ORDER — CITALOPRAM 10 MG/1
10 TABLET ORAL DAILY
Qty: 30 TABLET | Refills: 3 | Status: SHIPPED | OUTPATIENT
Start: 2023-02-28

## 2023-02-28 SDOH — ECONOMIC STABILITY: FOOD INSECURITY: WITHIN THE PAST 12 MONTHS, THE FOOD YOU BOUGHT JUST DIDN'T LAST AND YOU DIDN'T HAVE MONEY TO GET MORE.: NEVER TRUE

## 2023-02-28 SDOH — ECONOMIC STABILITY: HOUSING INSECURITY
IN THE LAST 12 MONTHS, WAS THERE A TIME WHEN YOU DID NOT HAVE A STEADY PLACE TO SLEEP OR SLEPT IN A SHELTER (INCLUDING NOW)?: NO

## 2023-02-28 SDOH — ECONOMIC STABILITY: INCOME INSECURITY: HOW HARD IS IT FOR YOU TO PAY FOR THE VERY BASICS LIKE FOOD, HOUSING, MEDICAL CARE, AND HEATING?: SOMEWHAT HARD

## 2023-02-28 SDOH — ECONOMIC STABILITY: FOOD INSECURITY: WITHIN THE PAST 12 MONTHS, YOU WORRIED THAT YOUR FOOD WOULD RUN OUT BEFORE YOU GOT MONEY TO BUY MORE.: SOMETIMES TRUE

## 2023-02-28 ASSESSMENT — ENCOUNTER SYMPTOMS
VOMITING: 0
SORE THROAT: 0
EYE DISCHARGE: 0
SINUS PAIN: 0
TROUBLE SWALLOWING: 0
NAUSEA: 0
SINUS PRESSURE: 0
CHEST TIGHTNESS: 0
EYE REDNESS: 0
COUGH: 0
DIARRHEA: 0
WHEEZING: 0
SHORTNESS OF BREATH: 0
ABDOMINAL PAIN: 0
EYE ITCHING: 0

## 2023-02-28 ASSESSMENT — PATIENT HEALTH QUESTIONNAIRE - PHQ9
5. POOR APPETITE OR OVEREATING: 2
8. MOVING OR SPEAKING SO SLOWLY THAT OTHER PEOPLE COULD HAVE NOTICED. OR THE OPPOSITE, BEING SO FIGETY OR RESTLESS THAT YOU HAVE BEEN MOVING AROUND A LOT MORE THAN USUAL: 0
SUM OF ALL RESPONSES TO PHQ QUESTIONS 1-9: 15
4. FEELING TIRED OR HAVING LITTLE ENERGY: 2
SUM OF ALL RESPONSES TO PHQ QUESTIONS 1-9: 15
9. THOUGHTS THAT YOU WOULD BE BETTER OFF DEAD, OR OF HURTING YOURSELF: 0
1. LITTLE INTEREST OR PLEASURE IN DOING THINGS: 1
SUM OF ALL RESPONSES TO PHQ9 QUESTIONS 1 & 2: 4
10. IF YOU CHECKED OFF ANY PROBLEMS, HOW DIFFICULT HAVE THESE PROBLEMS MADE IT FOR YOU TO DO YOUR WORK, TAKE CARE OF THINGS AT HOME, OR GET ALONG WITH OTHER PEOPLE: 3
7. TROUBLE CONCENTRATING ON THINGS, SUCH AS READING THE NEWSPAPER OR WATCHING TELEVISION: 1
3. TROUBLE FALLING OR STAYING ASLEEP: 3
2. FEELING DOWN, DEPRESSED OR HOPELESS: 3
6. FEELING BAD ABOUT YOURSELF - OR THAT YOU ARE A FAILURE OR HAVE LET YOURSELF OR YOUR FAMILY DOWN: 3

## 2023-02-28 NOTE — PROGRESS NOTES
[address] PRIMARY CARE  [address] [facility] [address]  [address] [address]  Dept: [phone]  Dept Fax: [phone]    Art Martin is a 38 y.o. female who presents today for her medical conditions/complaintsas noted below. Art Martin is c/o of Establish Care, Anxiety (Increased stress), Hypertension, Otitis Media (Having drainage in L ear that started last Wednesday, starting to develop pain in R ear), Mole (On finger), and Skin Tag (In L ear, had skin tag frozen off)        HPI:     Pt presents to establish care  Bp stable  Weight is stable    Pt presents to establish care    She is not happy with her weight. Recent foot surgery  Still having pain. She has to walk on the sides of her feet. C/o anxiety. She has had increased stress. She has trouble sleeping. She has been up since 345 thinking about all the issues going on. She is able to nap d/t exhaustion. She has been in therapy for a year. Started with her martial issues. She has tried wellbutrin 15 years ago but only took for a month. C/o left ear pain. Noticed drainage. Started Wednesday. Now starting to have right ear pain   Hx of wax   Skin tag in ear. Wart on right middle finger. Hx of CLAY. This improved after hysterectomy    Would like tested for STDs d/t her husbands history of cheating. No sx       Past Medical History:   Diagnosis Date    Abnormal pap     \" thought it was due to yeast infection\"    Allergic     heparin    Anemia     briefly RT bleeding    Asthma     Asthma  12/15/2015    Difficult intravenous access     May need PICC team for access.     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      Past Surgical History:   Procedure Laterality Date    APPENDECTOMY      laparoscopy--also 2 left one right ovarian cystectomies    BUNIONECTOMY Left 2023    LAPIDUS FOOT BUNIONECTOMY WITH LATERAL RELEASE performed by Christiane Hernández DPM at [facility] Bilateral     two cyst on left one on right ovaries    DILATION AND CURETTAGE OF UTERUS  14    DILATION AND CURETTAGE OF UTERUS  2017    HYSTERECTOMY ABDOMINAL N/A 2019    XI LAPAROSCOPIC ROBOTIC ABDOMINAL HYSTERECTOMY, BILATERAL SALPINGECTOMY, LEFT OVARIAN CYSTECTOMY, CYSTOSCOPY performed by Franchesca Cárdenas MD at [facility]  2014    exploratory laparoscopy    LASIK Bilateral 2013    OTHER SURGICAL HISTORY Bilateral 2019    XI LAPAROSCOPIC ROBOTIC ABDOMINAL HYSTERECTOMY, BILATERAL SALPINGECTOMY, LEFT OVARIAN CYSTECTOMY, CYSTOSCOPY    PA INDUCED  DILATION & EVACUATION N/A 2017    DILATATION AND CURETTAGE SUCTION performed by Adolph Nation MD at [address] [place] [address]  2000       Family History   Problem Relation Age of Onset    Diabetes Mother     High Blood Pressure Father     Cancer Paternal Uncle 46        throat/ smoked and drank    Clotting Disorder Maternal Grandmother         blood clots    Heart Attack Maternal Grandfather 59         MI    Alzheimer's Disease Paternal Grandmother     Macular Degen Paternal Grandfather     Heart Attack Paternal Grandfather 76        MI       Social History     Tobacco Use    Smoking status: Never    Smokeless tobacco: Never   Substance Use Topics    Alcohol use:  Yes     Alcohol/week: 0.0 standard drinks     Comment: rarely      Current Outpatient Medications   Medication Sig Dispense Refill    citalopram (CELEXA) 10 MG tablet Take 1 tablet by mouth daily 30 tablet 3    hydrOXYzine HCl (ATARAX) 25 MG tablet Take 1 tablet by mouth 2 times daily as needed for Anxiety 60 tablet 1    Handicap Placard MISC by Does not apply route Patient cannot walk 150 feet. Duration 6 months ending 6/5/2023 1 each 0     No current facility-administered medications for this visit. Allergies   Allergen Reactions    Heparin Anaphylaxis    Codeine Hives       Health Maintenance   Topic Date Due    COVID-19 Vaccine (1) Never done    Varicella vaccine (1 of 2 - 2-dose childhood series) Never done    Pneumococcal 0-64 years Vaccine (1 - PCV) Never done    Hepatitis C screen  Never done    DTaP/Tdap/Td vaccine (1 - Tdap) Never done    A1C test (Diabetic or Prediabetic)  06/27/2017    Cervical cancer screen  03/28/2022    Flu vaccine (1) Never done    Lipids  Never done    Depression Monitoring  02/28/2024    HIV screen  Completed    Hepatitis A vaccine  Aged Out    Hib vaccine  Aged Out    Meningococcal (ACWY) vaccine  Aged Out       :     Review of Systems   Constitutional:  Negative for chills, fatigue and fever. HENT:  Negative for ear discharge, ear pain, sinus pressure, sinus pain, sore throat and trouble swallowing. Eyes:  Negative for discharge, redness and itching. Respiratory:  Negative for cough, chest tightness, shortness of breath and wheezing. Cardiovascular:  Negative for chest pain. Gastrointestinal:  Negative for abdominal pain, diarrhea, nausea and vomiting. Genitourinary:  Negative for difficulty urinating. Musculoskeletal:  Negative for arthralgias and neck pain. Skin:  Negative for rash. Neurological:  Negative for dizziness, weakness, light-headedness and headaches. Psychiatric/Behavioral:  The patient is nervous/anxious. Insomnia   All other systems reviewed and are negative. Objective:     Physical Exam  Constitutional:       Appearance: Normal appearance. She is normal weight. HENT:      Head: Normocephalic and atraumatic.       Nose: Nose normal.   Eyes: Extraocular Movements: Extraocular movements intact. Conjunctiva/sclera: Conjunctivae normal.      Pupils: Pupils are equal, round, and reactive to light. Cardiovascular:      Rate and Rhythm: Normal rate and regular rhythm. Pulses: Normal pulses. Heart sounds: Normal heart sounds. Pulmonary:      Effort: Pulmonary effort is normal.      Breath sounds: Normal breath sounds. Abdominal:      General: Abdomen is flat. Palpations: Abdomen is soft. Musculoskeletal:         General: Normal range of motion. Cervical back: Neck supple. Skin:     General: Skin is warm and dry. Capillary Refill: Capillary refill takes less than 2 seconds. Neurological:      General: No focal deficit present. Mental Status: She is alert and oriented to person, place, and time. Psychiatric:         Mood and Affect: Mood normal.     /80   Pulse 90   Resp 16   Wt 211 lb 3.2 oz (95.8 kg)   LMP  (LMP Unknown)   SpO2 100%   BMI 31.19 kg/m²     Assessment:       Diagnosis Orders   1. Encounter to establish care        2. Asthma         3. Hallux valgus, left        4. Extension contracture of metatarsophalangeal (MTP) joint        5. Iron deficiency anemia, unspecified iron deficiency anemia type  CBC with Auto Differential    Iron and TIBC    Ferritin      6. Screening examination for STD (sexually transmitted disease)  Herpes Profile    HIV Screen    Hepatitis C Antibody    T. Pallidum Ab      7. Encounter for lipid screening for cardiovascular disease  Lipid Panel    Comprehensive Metabolic Panel      8. Vitamin B 12 deficiency  Vitamin B12 & Folate      9. Vitamin D deficiency  Vitamin D 25 Hydroxy      10. Screening for thyroid disorder  TSH    T4, Free      11. Screening for diabetes mellitus  Hemoglobin A1C      12. Anxiety            :      Return in about 1 month (around 3/28/2023) for physical .  1.  Encounter establish care  Reviewed previous records and lab work  2. Asthma  This was an issue when she was a child. No longer has any concerns of asthma. Does not use an inhaler  3-4. Left hallux valgus and contracture of MTP joint  Status post surgery of her left foot. She is due to follow-up with her podiatrist.  She does have to have surgery on the right foot  5-11. Screening  Plan to check lab work  15. Anxiety  Follow-up with a counselor  Agreeable to start Celexa 10 mg daily  Rx for hydroxyzine 25 mg twice a day as needed. She is to take the hydroxyzine for anxiety and to help her sleep at night  Patient is going to follow-up reported both for physical  Orders Placed This Encounter   Procedures    CBC with Auto Differential     Standing Status:   Future     Standing Expiration Date:   2/28/2024    Lipid Panel     Standing Status:   Future     Standing Expiration Date:   2/28/2024     Order Specific Question:   Is Patient Fasting?/# of Hours     Answer: Fast 8-10 hours    Comprehensive Metabolic Panel     Standing Status:   Future     Standing Expiration Date:   2/28/2024    Vitamin B12 & Folate     Standing Status:   Future     Standing Expiration Date:   2/28/2024    Vitamin D 25 Hydroxy     Standing Status:   Future     Standing Expiration Date:   2/28/2024    TSH     Standing Status:   Future     Standing Expiration Date:   2/28/2024    T4, Free     Standing Status:   Future     Standing Expiration Date:   2/28/2024    Hemoglobin A1C     Standing Status:   Future     Standing Expiration Date:   2/28/2024    Herpes Profile     Standing Status:   Future     Standing Expiration Date:   2/28/2024    HIV Screen     Standing Status:   Future     Standing Expiration Date:   2/28/2024    Hepatitis C Antibody     Standing Status:   Future     Standing Expiration Date:   2/28/2024    T.  Pallidum Ab     Standing Status:   Future     Standing Expiration Date:   2/28/2024    Iron and TIBC     Standing Status:   Future     Standing Expiration Date:   2/28/2024    Ferritin Standing Status:   Future     Standing Expiration Date:   2/28/2024     Orders Placed This Encounter   Medications    citalopram (CELEXA) 10 MG tablet     Sig: Take 1 tablet by mouth daily     Dispense:  30 tablet     Refill:  3    hydrOXYzine HCl (ATARAX) 25 MG tablet     Sig: Take 1 tablet by mouth 2 times daily as needed for Anxiety     Dispense:  60 tablet     Refill:  1       Patient given educational materials - seepatient instructions. Discussed use, benefit, and side effects of prescribed medications. All patient questions answered. Pt voiced understanding. Reviewed health maintenance. Instructed to continue current medications, diet and exercise. Patient agreedwith treatment plan. Follow up as directed.       Electronically signed by CHERELLE Haro CNP on 2/28/2023at 7:46 PM

## 2023-03-03 ENCOUNTER — OFFICE VISIT (OUTPATIENT)
Dept: PODIATRY | Age: 43
End: 2023-03-03

## 2023-03-03 VITALS — BODY MASS INDEX: 31.25 KG/M2 | HEIGHT: 69 IN | WEIGHT: 211 LBS

## 2023-03-03 DIAGNOSIS — R60.0 EDEMA, LOWER EXTREMITY: ICD-10-CM

## 2023-03-03 DIAGNOSIS — Z98.890 POST-OPERATIVE STATE: ICD-10-CM

## 2023-03-03 DIAGNOSIS — M79.672 PAIN IN LEFT FOOT: ICD-10-CM

## 2023-03-03 DIAGNOSIS — M20.12 HALLUX VALGUS, LEFT: Primary | ICD-10-CM

## 2023-03-03 PROCEDURE — 99024 POSTOP FOLLOW-UP VISIT: CPT | Performed by: PODIATRIST

## 2023-03-07 ASSESSMENT — ENCOUNTER SYMPTOMS
SHORTNESS OF BREATH: 0
COLOR CHANGE: 0
DIARRHEA: 0
NAUSEA: 0
BACK PAIN: 0

## 2023-03-07 NOTE — PROGRESS NOTES
[address] [facility] Podiatry  Return Patient Progress Note    Subjective: Phil Reeves 38 y.o. female that presents for follow up evaluation of Lapidus bunionectomy to the left foot. Chief Complaint   Patient presents with    Post-Op Check     Left foot    Patient's treatment thus far has included FWB in a regular shoe. Pain is rated 2 out of 10 and is described as intermittent. Patient has been following my prescribed course of therapy as instructed. Review of Systems   Constitutional:  Negative for activity change, appetite change, chills, diaphoresis, fatigue and fever. Respiratory:  Negative for shortness of breath. Cardiovascular:  Negative for leg swelling. Gastrointestinal:  Negative for diarrhea and nausea. Endocrine: Negative for cold intolerance, heat intolerance and polyuria. Musculoskeletal:  Positive for gait problem and joint swelling. Negative for arthralgias, back pain and myalgias. Skin:  Negative for color change, pallor, rash and wound. Allergic/Immunologic: Negative for environmental allergies and food allergies. Neurological:  Negative for dizziness, weakness, light-headedness and numbness. Hematological:  Does not bruise/bleed easily. Psychiatric/Behavioral:  Negative for behavioral problems, confusion and self-injury. The patient is not nervous/anxious. Objective: Clinical evaluation of the patient reveals continued abduction of the hallux at the MTPJ of the left foot, but this is flexible. There is only mild pain with reduction of this deformity. There remains moderate edema to the left foot. There is no erythema or calor noted to the left foot. There is no motion to the left first metatarsal cuneiform joint. There is no pain today with attempt at this range of motion. There is limitation with range of motion to the left first MTPJ upon dorsiflexion. There is pain with this range of motion. Assessment:    Diagnosis Orders   1. Hallux valgus, left        2. Edema, lower extremity        3. Pain in left foot        4. Post-operative state              Plan: 1. Clinical evaluation of the patient. 2. Patient informed that she will need a return to the OR to remove more capsule from the left MTPJ. Patient also is scheduled for Lapidus bunionectomy to the right foot. Patient states that she will schedule the capsulotomy of the left foot after recovery of her right foot. 3. Return if symptoms worsen or fail to improve.    3/3/2023      Emelina Mir DPM

## 2023-04-04 ENCOUNTER — OFFICE VISIT (OUTPATIENT)
Dept: PRIMARY CARE CLINIC | Age: 43
End: 2023-04-04
Payer: COMMERCIAL

## 2023-04-04 VITALS
SYSTOLIC BLOOD PRESSURE: 130 MMHG | RESPIRATION RATE: 20 BRPM | WEIGHT: 210.4 LBS | HEART RATE: 73 BPM | OXYGEN SATURATION: 99 % | HEIGHT: 69 IN | BODY MASS INDEX: 31.16 KG/M2 | DIASTOLIC BLOOD PRESSURE: 94 MMHG

## 2023-04-04 DIAGNOSIS — M79.2 NERVE PAIN: ICD-10-CM

## 2023-04-04 DIAGNOSIS — F41.9 ANXIETY: Primary | ICD-10-CM

## 2023-04-04 DIAGNOSIS — I10 PRIMARY HYPERTENSION: ICD-10-CM

## 2023-04-04 DIAGNOSIS — M20.12 HALLUX VALGUS, LEFT: ICD-10-CM

## 2023-04-04 PROCEDURE — 3079F DIAST BP 80-89 MM HG: CPT | Performed by: NURSE PRACTITIONER

## 2023-04-04 PROCEDURE — 99214 OFFICE O/P EST MOD 30 MIN: CPT | Performed by: NURSE PRACTITIONER

## 2023-04-04 PROCEDURE — 3075F SYST BP GE 130 - 139MM HG: CPT | Performed by: NURSE PRACTITIONER

## 2023-04-04 RX ORDER — LISINOPRIL 10 MG/1
10 TABLET ORAL DAILY
Qty: 30 TABLET | Refills: 0 | Status: SHIPPED | OUTPATIENT
Start: 2023-04-04

## 2023-04-04 RX ORDER — HYDROXYZINE HYDROCHLORIDE 25 MG/1
12.5 TABLET, FILM COATED ORAL NIGHTLY
Qty: 30 TABLET | Refills: 2 | Status: SHIPPED | OUTPATIENT
Start: 2023-04-04 | End: 2023-05-04

## 2023-04-04 RX ORDER — BUPROPION HYDROCHLORIDE 150 MG/1
150 TABLET ORAL EVERY MORNING
Qty: 30 TABLET | Refills: 3 | Status: SHIPPED | OUTPATIENT
Start: 2023-04-04

## 2023-04-04 RX ORDER — GABAPENTIN 100 MG/1
100 CAPSULE ORAL 2 TIMES DAILY
Qty: 60 CAPSULE | Refills: 0 | Status: SHIPPED | OUTPATIENT
Start: 2023-04-04 | End: 2023-05-04

## 2023-04-04 ASSESSMENT — ENCOUNTER SYMPTOMS
SINUS PRESSURE: 0
NAUSEA: 0
EYE REDNESS: 0
ABDOMINAL PAIN: 0
WHEEZING: 0
VOMITING: 0
COUGH: 0
CHEST TIGHTNESS: 0
SINUS PAIN: 0
EYE DISCHARGE: 0
DIARRHEA: 0
SHORTNESS OF BREATH: 0
TROUBLE SWALLOWING: 0
SORE THROAT: 0
EYE ITCHING: 0

## 2023-04-04 ASSESSMENT — PATIENT HEALTH QUESTIONNAIRE - PHQ9
1. LITTLE INTEREST OR PLEASURE IN DOING THINGS: 2
8. MOVING OR SPEAKING SO SLOWLY THAT OTHER PEOPLE COULD HAVE NOTICED. OR THE OPPOSITE, BEING SO FIGETY OR RESTLESS THAT YOU HAVE BEEN MOVING AROUND A LOT MORE THAN USUAL: 0
10. IF YOU CHECKED OFF ANY PROBLEMS, HOW DIFFICULT HAVE THESE PROBLEMS MADE IT FOR YOU TO DO YOUR WORK, TAKE CARE OF THINGS AT HOME, OR GET ALONG WITH OTHER PEOPLE: 2
SUM OF ALL RESPONSES TO PHQ QUESTIONS 1-9: 12
4. FEELING TIRED OR HAVING LITTLE ENERGY: 3
SUM OF ALL RESPONSES TO PHQ9 QUESTIONS 1 & 2: 4
2. FEELING DOWN, DEPRESSED OR HOPELESS: 2
6. FEELING BAD ABOUT YOURSELF - OR THAT YOU ARE A FAILURE OR HAVE LET YOURSELF OR YOUR FAMILY DOWN: 2
5. POOR APPETITE OR OVEREATING: 0
3. TROUBLE FALLING OR STAYING ASLEEP: 0
9. THOUGHTS THAT YOU WOULD BE BETTER OFF DEAD, OR OF HURTING YOURSELF: 0
SUM OF ALL RESPONSES TO PHQ QUESTIONS 1-9: 12
7. TROUBLE CONCENTRATING ON THINGS, SUCH AS READING THE NEWSPAPER OR WATCHING TELEVISION: 3

## 2023-04-04 NOTE — PROGRESS NOTES
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 Bilateral     two cyst on left one on right ovaries    DILATION AND CURETTAGE OF UTERUS  14    DILATION AND CURETTAGE OF UTERUS  2017    HYSTERECTOMY ABDOMINAL N/A 2019    XI LAPAROSCOPIC ROBOTIC ABDOMINAL HYSTERECTOMY, BILATERAL SALPINGECTOMY, LEFT OVARIAN CYSTECTOMY, CYSTOSCOPY performed by Maximiano Duane, MD at [address] [facility]  2014    exploratory laparoscopy    LASIK Bilateral 2013    OTHER SURGICAL HISTORY Bilateral 2019    XI LAPAROSCOPIC ROBOTIC ABDOMINAL HYSTERECTOMY, BILATERAL SALPINGECTOMY, LEFT OVARIAN CYSTECTOMY, CYSTOSCOPY    NE INDUCED  DILATION & EVACUATION N/A 2017    DILATATION AND CURETTAGE SUCTION performed by Shlomo Givens MD at [address] [place] [address]  2000       Family History   Problem Relation Age of Onset    Diabetes Mother     High Blood Pressure Father     Cancer Paternal Uncle 46        throat/ smoked and drank    Clotting Disorder Maternal Grandmother         blood clots    Heart Attack Maternal Grandfather 59         MI    Alzheimer's Disease Paternal Grandmother     Macular Degen Paternal Grandfather     Heart Attack Paternal Grandfather 76        MI       Social History     Tobacco Use    Smoking status: Never    Smokeless tobacco: Never   Substance Use Topics    Alcohol use:  Yes     Alcohol/week: 0.0 standard drinks     Comment: rarely      Current Outpatient Medications   Medication Sig Dispense Refill    hydrOXYzine HCl (ATARAX) 25 MG tablet Take 0.5 tablets by mouth at bedtime 30 tablet 2

## 2023-04-26 RX ORDER — LISINOPRIL 10 MG/1
TABLET ORAL
Qty: 30 TABLET | Refills: 0 | Status: SHIPPED | OUTPATIENT
Start: 2023-04-26

## 2023-05-04 ENCOUNTER — OFFICE VISIT (OUTPATIENT)
Dept: PRIMARY CARE CLINIC | Age: 43
End: 2023-05-04
Payer: COMMERCIAL

## 2023-05-04 VITALS
DIASTOLIC BLOOD PRESSURE: 82 MMHG | WEIGHT: 214.6 LBS | SYSTOLIC BLOOD PRESSURE: 118 MMHG | RESPIRATION RATE: 18 BRPM | BODY MASS INDEX: 31.69 KG/M2 | OXYGEN SATURATION: 100 % | HEART RATE: 95 BPM

## 2023-05-04 DIAGNOSIS — M79.2 NERVE PAIN: ICD-10-CM

## 2023-05-04 DIAGNOSIS — I10 PRIMARY HYPERTENSION: Primary | ICD-10-CM

## 2023-05-04 DIAGNOSIS — F41.9 ANXIETY: ICD-10-CM

## 2023-05-04 PROCEDURE — 99214 OFFICE O/P EST MOD 30 MIN: CPT | Performed by: NURSE PRACTITIONER

## 2023-05-04 PROCEDURE — 3074F SYST BP LT 130 MM HG: CPT | Performed by: NURSE PRACTITIONER

## 2023-05-04 PROCEDURE — 3079F DIAST BP 80-89 MM HG: CPT | Performed by: NURSE PRACTITIONER

## 2023-05-04 RX ORDER — CITALOPRAM 20 MG/1
20 TABLET ORAL DAILY
Qty: 30 TABLET | Refills: 1 | Status: SHIPPED | OUTPATIENT
Start: 2023-05-04

## 2023-05-04 RX ORDER — LOSARTAN POTASSIUM 25 MG/1
25 TABLET ORAL DAILY
Qty: 30 TABLET | Refills: 0 | Status: SHIPPED | OUTPATIENT
Start: 2023-05-04

## 2023-05-04 RX ORDER — LIDOCAINE 50 MG/G
OINTMENT TOPICAL
Qty: 30 G | Refills: 4 | Status: SHIPPED | OUTPATIENT
Start: 2023-05-04

## 2023-05-04 RX ORDER — PROPRANOLOL HYDROCHLORIDE 20 MG/1
20 TABLET ORAL 3 TIMES DAILY
Qty: 90 TABLET | Refills: 3 | Status: SHIPPED | OUTPATIENT
Start: 2023-05-04

## 2023-05-04 RX ORDER — HYDROXYZINE HYDROCHLORIDE 25 MG/1
25 TABLET, FILM COATED ORAL NIGHTLY
Qty: 30 TABLET | Refills: 2 | Status: SHIPPED | OUTPATIENT
Start: 2023-05-04 | End: 2023-06-03

## 2023-05-04 ASSESSMENT — ENCOUNTER SYMPTOMS
EYE DISCHARGE: 0
SHORTNESS OF BREATH: 0
SINUS PRESSURE: 0
COUGH: 0
WHEEZING: 0
SINUS PAIN: 0
ABDOMINAL PAIN: 0
EYE ITCHING: 0
EYE REDNESS: 0
SORE THROAT: 0
CHEST TIGHTNESS: 0
DIARRHEA: 0
VOMITING: 0
TROUBLE SWALLOWING: 0
NAUSEA: 0

## 2023-05-04 ASSESSMENT — PATIENT HEALTH QUESTIONNAIRE - PHQ9
7. TROUBLE CONCENTRATING ON THINGS, SUCH AS READING THE NEWSPAPER OR WATCHING TELEVISION: 0
5. POOR APPETITE OR OVEREATING: 0
9. THOUGHTS THAT YOU WOULD BE BETTER OFF DEAD, OR OF HURTING YOURSELF: 0
SUM OF ALL RESPONSES TO PHQ QUESTIONS 1-9: 0
SUM OF ALL RESPONSES TO PHQ QUESTIONS 1-9: 0
3. TROUBLE FALLING OR STAYING ASLEEP: 0
8. MOVING OR SPEAKING SO SLOWLY THAT OTHER PEOPLE COULD HAVE NOTICED. OR THE OPPOSITE, BEING SO FIGETY OR RESTLESS THAT YOU HAVE BEEN MOVING AROUND A LOT MORE THAN USUAL: 0
SUM OF ALL RESPONSES TO PHQ9 QUESTIONS 1 & 2: 0
2. FEELING DOWN, DEPRESSED OR HOPELESS: 0
SUM OF ALL RESPONSES TO PHQ QUESTIONS 1-9: 0
4. FEELING TIRED OR HAVING LITTLE ENERGY: 0
10. IF YOU CHECKED OFF ANY PROBLEMS, HOW DIFFICULT HAVE THESE PROBLEMS MADE IT FOR YOU TO DO YOUR WORK, TAKE CARE OF THINGS AT HOME, OR GET ALONG WITH OTHER PEOPLE: 0
1. LITTLE INTEREST OR PLEASURE IN DOING THINGS: 0
6. FEELING BAD ABOUT YOURSELF - OR THAT YOU ARE A FAILURE OR HAVE LET YOURSELF OR YOUR FAMILY DOWN: 0
SUM OF ALL RESPONSES TO PHQ QUESTIONS 1-9: 0

## 2023-05-04 NOTE — PROGRESS NOTES
[facility] CARE  [facility] [address]  [address] [address]  Dept: [phone]  Dept Fax: [phone]    Adalgisa Valencia is a 38 y.o. female who presents today for her medical conditions/complaintsas noted below. Adalgisa Valencia is c/o of Medication Check (1 mo f/u) and Discuss Medications (Gets dry cough with lisinopril, discuss celexa)        HPI:     Patient presents today for an office visit  Blood pressure stable  Weight is up 4 pounds since last visit    Patient presents for an office visit today for medication follow-up. The Wellbutrin has helped with her sexual dysfunction side effects. She is feeling a little bit better while taking this medication. Her anxiety has been high in the lately. Her  is out of town and she thinks that she may be getting stalked by somebody online. This is made her very uneasy. No thoughts of harming her self or others    She does think that she needs to take 1 full dose of hydroxyzine versus half a tablet this does work better for her    The gabapentin is helping with the nerve leg pain at night    Complains of a cough since starting lisinopril    She is scheduled to have her foot surgery on the right foot on June 14      Past Medical History:   Diagnosis Date    Abnormal pap     \" thought it was due to yeast infection\"    Allergic     heparin    Anemia     briefly RT bleeding    Asthma     Asthma  12/15/2015    Difficult intravenous access     May need PICC team for access.     Gestational HTN 6/27/2016    History of recurrent UTIs     frequent UTI during past pregnancy    Hx of blood clots 1996    small clot behind knee after surgery in 1996 surgeries since w/o problem     Miscarriage     pt has had multiple miscarrages on at 45 weeks,20 ,18 ,14 weeks an multiple at 12 weeks    MTHFR mutation     multiple miscarriages, does not see hematology    S/P laparoscopy 6/14/2019    Spine anomaly     Curve in spine

## 2023-05-05 RX ORDER — GABAPENTIN 100 MG/1
CAPSULE ORAL
Qty: 60 CAPSULE | Refills: 2 | Status: SHIPPED | OUTPATIENT
Start: 2023-05-05 | End: 2023-06-04

## 2023-05-25 NOTE — H&P
HISTORY and [facility] [address]       NAME:  Ashleigh Wilson  MRN: [id number]   YOB: 1980   Date: 5/26/2023   Age: 38 y.o. Gender: female       COMPLAINT AND PRESENT HISTORY:     Ashleigh Wilson is 38 y.o. , female, Preadmission Testing for Pre-Op Diagnosis Codes:     * Hallux abducto valgus, right     Patient will be scheduled for : LAPIDUS FOOT BUNIONECTOMY OF THE RIGHT FOOT    Initial office visit on 12/6/22  Ashleigh Wilson is a 38 y.o. female who presents to the office today with chief complaint of painful bunions to both feet, left greater than right. Chief Complaint  Patient presents with   Bunions      B/l feet   Symptoms have been present since she was about 10years old. Patient denies injury to the feet. Patient states that the bunions are painful with shoe gear and ambulation. Pain is rated 8 out of 10 at it's worst and is described as intermittent. Treatments prior to today's visit include: Patient has tried different shoes without relief. INDICATIONS FOR PROCEDURE: Patient is a 38year old female who has had multiple progressive complaints of the HAV and left foot pain foot for several years. 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 Preoperative radiographs of the left foot were obtained which demonstrated, increased intermetatarsal angle and severe hallux valgus deformity. Pain has progressed to the point of limiting ambulation, difficulty in shoe gear, and negatively affecting daily activity. Patient has failed numerous conservative treatments including anti-inflammatory medication, offloading padding and inserts, shoe gear modifications, physical therapy, lifestyle modifications and elects to undergo surgical correction.  We once again discussed surgical intervention including lapidus procedure of left foot including all the risks,

## 2023-05-26 ENCOUNTER — HOSPITAL ENCOUNTER (OUTPATIENT)
Age: 43
Discharge: HOME | End: 2023-05-26
Attending: PODIATRIST
Payer: COMMERCIAL

## 2023-05-26 ENCOUNTER — HOSPITAL ENCOUNTER (OUTPATIENT)
Dept: PREADMISSION TESTING | Age: 43
End: 2023-05-26
Attending: PODIATRIST | Admitting: PODIATRIST
Payer: COMMERCIAL

## 2023-05-26 VITALS
OXYGEN SATURATION: 100 % | DIASTOLIC BLOOD PRESSURE: 86 MMHG | HEART RATE: 75 BPM | BODY MASS INDEX: 31.1 KG/M2 | WEIGHT: 210 LBS | RESPIRATION RATE: 16 BRPM | TEMPERATURE: 98.8 F | SYSTOLIC BLOOD PRESSURE: 143 MMHG | HEIGHT: 69 IN

## 2023-05-26 DIAGNOSIS — Z13.6 ENCOUNTER FOR LIPID SCREENING FOR CARDIOVASCULAR DISEASE: ICD-10-CM

## 2023-05-26 DIAGNOSIS — D50.9 IRON DEFICIENCY ANEMIA, UNSPECIFIED IRON DEFICIENCY ANEMIA TYPE: ICD-10-CM

## 2023-05-26 DIAGNOSIS — E53.8 VITAMIN B 12 DEFICIENCY: ICD-10-CM

## 2023-05-26 DIAGNOSIS — Z11.3 SCREENING EXAMINATION FOR STD (SEXUALLY TRANSMITTED DISEASE): ICD-10-CM

## 2023-05-26 DIAGNOSIS — Z13.220 ENCOUNTER FOR LIPID SCREENING FOR CARDIOVASCULAR DISEASE: ICD-10-CM

## 2023-05-26 DIAGNOSIS — Z13.1 SCREENING FOR DIABETES MELLITUS: ICD-10-CM

## 2023-05-26 DIAGNOSIS — E55.9 VITAMIN D DEFICIENCY: ICD-10-CM

## 2023-05-26 DIAGNOSIS — Z13.29 SCREENING FOR THYROID DISORDER: ICD-10-CM

## 2023-05-26 LAB
25(OH)D3 SERPL-MCNC: 25.9 NG/ML
ALBUMIN SERPL-MCNC: 4.4 G/DL (ref 3.5–5.2)
ALP SERPL-CCNC: 77 U/L (ref 35–104)
ALT SERPL-CCNC: 11 U/L (ref 5–33)
ANION GAP SERPL CALCULATED.3IONS-SCNC: 10 MMOL/L (ref 9–17)
ANION GAP SERPL CALCULATED.3IONS-SCNC: 10 MMOL/L (ref 9–17)
AST SERPL-CCNC: 11 U/L
BASOPHILS # BLD: 0.1 K/UL (ref 0–0.2)
BASOPHILS # BLD: 0.1 K/UL (ref 0–0.2)
BASOPHILS NFR BLD: 1 % (ref 0–2)
BASOPHILS NFR BLD: 1 % (ref 0–2)
BILIRUB SERPL-MCNC: 0.4 MG/DL (ref 0.3–1.2)
BUN SERPL-MCNC: 13 MG/DL (ref 6–20)
BUN SERPL-MCNC: 13 MG/DL (ref 6–20)
CALCIUM SERPL-MCNC: 8.9 MG/DL (ref 8.6–10.4)
CALCIUM SERPL-MCNC: 8.9 MG/DL (ref 8.6–10.4)
CHLORIDE SERPL-SCNC: 103 MMOL/L (ref 98–107)
CHLORIDE SERPL-SCNC: 103 MMOL/L (ref 98–107)
CHOLEST SERPL-MCNC: 188 MG/DL
CHOLESTEROL/HDL RATIO: 3.1
CO2 SERPL-SCNC: 26 MMOL/L (ref 20–31)
CO2 SERPL-SCNC: 26 MMOL/L (ref 20–31)
CREAT SERPL-MCNC: 0.74 MG/DL (ref 0.5–0.9)
CREAT SERPL-MCNC: 0.74 MG/DL (ref 0.5–0.9)
EOSINOPHIL # BLD: 0.2 K/UL (ref 0–0.4)
EOSINOPHIL # BLD: 0.2 K/UL (ref 0–0.4)
EOSINOPHILS RELATIVE PERCENT: 3 % (ref 0–4)
EOSINOPHILS RELATIVE PERCENT: 3 % (ref 0–4)
ERYTHROCYTE [DISTWIDTH] IN BLOOD BY AUTOMATED COUNT: 14.2 % (ref 11.5–14.9)
ERYTHROCYTE [DISTWIDTH] IN BLOOD BY AUTOMATED COUNT: 14.2 % (ref 11.5–14.9)
EST. AVERAGE GLUCOSE BLD GHB EST-MCNC: 105 MG/DL
FERRITIN SERPL-MCNC: 77 NG/ML (ref 13–150)
FOLATE SERPL-MCNC: 7.9 NG/ML
GFR SERPL CREATININE-BSD FRML MDRD: >60 ML/MIN/1.73M2
GFR SERPL CREATININE-BSD FRML MDRD: >60 ML/MIN/1.73M2
GLUCOSE SERPL-MCNC: 105 MG/DL (ref 70–99)
GLUCOSE SERPL-MCNC: 105 MG/DL (ref 70–99)
HBA1C MFR BLD: 5.3 % (ref 4–6)
HCT VFR BLD AUTO: 39.4 % (ref 36–46)
HCT VFR BLD AUTO: 39.4 % (ref 36–46)
HCV AB SERPL QL IA: NONREACTIVE
HDLC SERPL-MCNC: 60 MG/DL
HGB BLD-MCNC: 13.5 G/DL (ref 12–16)
HGB BLD-MCNC: 13.5 G/DL (ref 12–16)
HIV 1+2 AB+HIV1 P24 AG SERPL QL IA: NONREACTIVE
IRON SATN MFR SERPL: 19 % (ref 20–55)
IRON SERPL-MCNC: 55 UG/DL (ref 37–145)
LDLC SERPL CALC-MCNC: 109 MG/DL (ref 0–130)
LYMPHOCYTES # BLD: 25 % (ref 24–44)
LYMPHOCYTES # BLD: 25 % (ref 24–44)
LYMPHOCYTES NFR BLD: 2.3 K/UL (ref 1–4.8)
LYMPHOCYTES NFR BLD: 2.3 K/UL (ref 1–4.8)
MCH RBC QN AUTO: 28.5 PG (ref 26–34)
MCH RBC QN AUTO: 28.5 PG (ref 26–34)
MCHC RBC AUTO-ENTMCNC: 34.3 G/DL (ref 31–37)
MCHC RBC AUTO-ENTMCNC: 34.3 G/DL (ref 31–37)
MCV RBC AUTO: 83.2 FL (ref 80–100)
MCV RBC AUTO: 83.2 FL (ref 80–100)
MONOCYTES NFR BLD: 0.5 K/UL (ref 0.1–1.3)
MONOCYTES NFR BLD: 0.5 K/UL (ref 0.1–1.3)
MONOCYTES NFR BLD: 6 % (ref 1–7)
MONOCYTES NFR BLD: 6 % (ref 1–7)
NEUTROPHILS NFR BLD: 65 % (ref 36–66)
NEUTROPHILS NFR BLD: 65 % (ref 36–66)
NEUTS SEG NFR BLD: 6.1 K/UL (ref 1.3–9.1)
NEUTS SEG NFR BLD: 6.1 K/UL (ref 1.3–9.1)
PLATELET # BLD AUTO: 412 K/UL (ref 150–450)
PLATELET # BLD AUTO: 412 K/UL (ref 150–450)
PMV BLD AUTO: 8 FL (ref 6–12)
PMV BLD AUTO: 8 FL (ref 6–12)
POTASSIUM SERPL-SCNC: 4.2 MMOL/L (ref 3.7–5.3)
POTASSIUM SERPL-SCNC: 4.2 MMOL/L (ref 3.7–5.3)
PROT SERPL-MCNC: 7.3 G/DL (ref 6.4–8.3)
RBC # BLD AUTO: 4.74 M/UL (ref 4–5.2)
RBC # BLD AUTO: 4.74 M/UL (ref 4–5.2)
SODIUM SERPL-SCNC: 139 MMOL/L (ref 135–144)
SODIUM SERPL-SCNC: 139 MMOL/L (ref 135–144)
T PALLIDUM AB SER QL IA: NONREACTIVE
T4 FREE SERPL-MCNC: 1 NG/DL (ref 0.9–1.7)
TIBC SERPL-MCNC: 286 UG/DL (ref 250–450)
TRIGL SERPL-MCNC: 93 MG/DL
TSH SERPL-MCNC: 1.04 UIU/ML (ref 0.3–5)
UNSATURATED IRON BINDING CAPACITY: 231 UG/DL (ref 112–347)
VIT B12 SERPL-MCNC: 513 PG/ML (ref 232–1245)
WBC OTHER # BLD: 9.3 K/UL (ref 3.5–11)
WBC OTHER # BLD: 9.3 K/UL (ref 3.5–11)

## 2023-05-26 PROCEDURE — 83550 IRON BINDING TEST: CPT

## 2023-05-26 PROCEDURE — 86694 HERPES SIMPLEX NES ANTBDY: CPT

## 2023-05-26 PROCEDURE — 86695 HERPES SIMPLEX TYPE 1 TEST: CPT

## 2023-05-26 PROCEDURE — 93005 ELECTROCARDIOGRAM TRACING: CPT

## 2023-05-26 PROCEDURE — 80048 BASIC METABOLIC PNL TOTAL CA: CPT

## 2023-05-26 PROCEDURE — 84443 ASSAY THYROID STIM HORMONE: CPT

## 2023-05-26 PROCEDURE — 80053 COMPREHEN METABOLIC PANEL: CPT

## 2023-05-26 PROCEDURE — 86696 HERPES SIMPLEX TYPE 2 TEST: CPT

## 2023-05-26 PROCEDURE — 82607 VITAMIN B-12: CPT

## 2023-05-26 PROCEDURE — 85025 COMPLETE CBC W/AUTO DIFF WBC: CPT

## 2023-05-26 PROCEDURE — 87389 HIV-1 AG W/HIV-1&-2 AB AG IA: CPT

## 2023-05-26 PROCEDURE — 36415 COLL VENOUS BLD VENIPUNCTURE: CPT

## 2023-05-26 PROCEDURE — 80061 LIPID PANEL: CPT

## 2023-05-26 PROCEDURE — 84439 ASSAY OF FREE THYROXINE: CPT

## 2023-05-26 PROCEDURE — 86803 HEPATITIS C AB TEST: CPT

## 2023-05-26 PROCEDURE — 82746 ASSAY OF FOLIC ACID SERUM: CPT

## 2023-05-26 PROCEDURE — 83036 HEMOGLOBIN GLYCOSYLATED A1C: CPT

## 2023-05-26 PROCEDURE — 83540 ASSAY OF IRON: CPT

## 2023-05-26 PROCEDURE — APPSS45 APP SPLIT SHARED TIME 31-45 MINUTES: Performed by: NURSE PRACTITIONER

## 2023-05-26 PROCEDURE — 82306 VITAMIN D 25 HYDROXY: CPT

## 2023-05-26 PROCEDURE — 82728 ASSAY OF FERRITIN: CPT

## 2023-05-26 PROCEDURE — 86780 TREPONEMA PALLIDUM: CPT

## 2023-05-26 NOTE — PROGRESS NOTES
Called and spoke with Filippo James at Dr. Adalberto Dorsey office. Patient concerned about IV for day of surgery. Patient relates she has always been a hard IV stick, blood draw & has needed several PICC lines in the past. Filippo James relates that she will speak with Dr. Ramu Griffin next week when he is back in the office and follow up with patient.

## 2023-05-26 NOTE — DISCHARGE INSTRUCTIONS
Pre-op Instructions For Out-Patient Surgery    Medication Instructions:  Please stop herbs and any supplements now (includes vitamins and minerals). Please contact your surgeon and prescribing physician for pre-op instructions for any blood thinners. If you have inhalers/aerosol treatments at home, please use them the morning of your surgery and bring the inhalers with you to the hospital.    Please take the following medications the morning of your surgery with a sip of water:    Losartan, Propranolol    Surgery Instructions:  After midnight before surgery:  Do not eat or drink anything, including water, mints, gum, and hard candy. You may brush your teeth without swallowing. No smoking, chewing tobacco, or street drugs. Please shower or bathe before surgery. If you were given Surgical Scrub Chlorhexidine Gluconate Liquid (CHG), please shower the night before and the morning of your surgery following the detailed instructions you received during your pre-admission visit. Please do not wear any cologne, lotion, powder, deodorant, jewelry, piercings, perfume, makeup, nail polish, hair accessories, or hair spray on the day of surgery. Wear loose comfortable clothing. Leave your valuables at home. Bring a storage case for any glasses/contacts. An adult who is responsible for you MUST drive you home and should be with you for the first 24 hours after surgery. If having out-patient knee and foot surgeries, please arrange for planned crutches, walker, or wheelchair before arriving to the hospital.    The Day of Surgery:  Arrive at [facility] Surgery Entrance at the time directed by your surgeon and check in at the desk. If you have a living will or healthcare power of , please bring a copy. You will be taken to the pre-op holding area where you will be prepared for surgery.   A physical assessment will be performed by a nurse practitioner

## 2023-05-27 LAB
EKG ATRIAL RATE: 67 BPM
EKG P AXIS: 70 DEGREES
EKG P-R INTERVAL: 144 MS
EKG Q-T INTERVAL: 390 MS
EKG QRS DURATION: 92 MS
EKG QTC CALCULATION (BAZETT): 412 MS
EKG R AXIS: 75 DEGREES
EKG T AXIS: 37 DEGREES
EKG VENTRICULAR RATE: 67 BPM

## 2023-05-28 DIAGNOSIS — I10 PRIMARY HYPERTENSION: ICD-10-CM

## 2023-05-30 LAB
EKG ATRIAL RATE: 67 BPM
EKG P AXIS: 70 DEGREES
EKG P-R INTERVAL: 144 MS
EKG Q-T INTERVAL: 390 MS
EKG QRS DURATION: 92 MS
EKG QTC CALCULATION (BAZETT): 412 MS
EKG R AXIS: 75 DEGREES
EKG T AXIS: 37 DEGREES
EKG VENTRICULAR RATE: 67 BPM
HERPES SIMPLEX VIRUS 2 IGG: 3.92
HERPES TYPE 1/2 IGM COMBINED: 1.96
HSV1 IGG SERPL QL IA: 0.6

## 2023-05-30 RX ORDER — LOSARTAN POTASSIUM 25 MG/1
TABLET ORAL
Qty: 30 TABLET | Refills: 0 | Status: SHIPPED | OUTPATIENT
Start: 2023-05-30

## 2023-05-30 RX ORDER — VALACYCLOVIR HYDROCHLORIDE 1 G/1
1000 TABLET, FILM COATED ORAL 2 TIMES DAILY
Qty: 20 TABLET | Refills: 0 | Status: SHIPPED | OUTPATIENT
Start: 2023-05-30

## 2023-06-19 ENCOUNTER — OFFICE VISIT (OUTPATIENT)
Dept: PODIATRY | Age: 43
End: 2023-06-19

## 2023-06-19 ENCOUNTER — OFFICE VISIT (OUTPATIENT)
Dept: PRIMARY CARE CLINIC | Age: 43
End: 2023-06-19
Payer: COMMERCIAL

## 2023-06-19 VITALS
RESPIRATION RATE: 16 BRPM | HEART RATE: 67 BPM | BODY MASS INDEX: 33.97 KG/M2 | WEIGHT: 230 LBS | OXYGEN SATURATION: 99 % | SYSTOLIC BLOOD PRESSURE: 120 MMHG | DIASTOLIC BLOOD PRESSURE: 76 MMHG

## 2023-06-19 VITALS — HEIGHT: 69 IN | WEIGHT: 230 LBS | BODY MASS INDEX: 34.07 KG/M2

## 2023-06-19 DIAGNOSIS — F41.9 ANXIETY: ICD-10-CM

## 2023-06-19 DIAGNOSIS — M20.11 HALLUX VALGUS, RIGHT: Primary | ICD-10-CM

## 2023-06-19 DIAGNOSIS — G62.9 NEUROPATHY: ICD-10-CM

## 2023-06-19 DIAGNOSIS — B00.9 HSV-2 (HERPES SIMPLEX VIRUS 2) INFECTION: ICD-10-CM

## 2023-06-19 DIAGNOSIS — R60.0 EDEMA, LOWER EXTREMITY: ICD-10-CM

## 2023-06-19 DIAGNOSIS — I10 PRIMARY HYPERTENSION: Primary | ICD-10-CM

## 2023-06-19 DIAGNOSIS — R26.81 GAIT INSTABILITY: ICD-10-CM

## 2023-06-19 DIAGNOSIS — Z98.890 S/P FOOT SURGERY, RIGHT: ICD-10-CM

## 2023-06-19 DIAGNOSIS — E66.09 CLASS 1 OBESITY DUE TO EXCESS CALORIES WITH SERIOUS COMORBIDITY AND BODY MASS INDEX (BMI) OF 33.0 TO 33.9 IN ADULT: ICD-10-CM

## 2023-06-19 DIAGNOSIS — Z98.890 POST-OPERATIVE STATE: ICD-10-CM

## 2023-06-19 DIAGNOSIS — M79.671 PAIN IN RIGHT FOOT: ICD-10-CM

## 2023-06-19 PROCEDURE — 99024 POSTOP FOLLOW-UP VISIT: CPT | Performed by: PODIATRIST

## 2023-06-19 PROCEDURE — 3074F SYST BP LT 130 MM HG: CPT | Performed by: NURSE PRACTITIONER

## 2023-06-19 PROCEDURE — 3078F DIAST BP <80 MM HG: CPT | Performed by: NURSE PRACTITIONER

## 2023-06-19 PROCEDURE — 99214 OFFICE O/P EST MOD 30 MIN: CPT | Performed by: NURSE PRACTITIONER

## 2023-06-19 RX ORDER — HYDROXYZINE HYDROCHLORIDE 25 MG/1
TABLET, FILM COATED ORAL
COMMUNITY
Start: 2023-06-14 | End: 2023-06-19 | Stop reason: SDUPTHER

## 2023-06-19 RX ORDER — LOSARTAN POTASSIUM 25 MG/1
25 TABLET ORAL DAILY
Qty: 90 TABLET | Refills: 1 | Status: SHIPPED | OUTPATIENT
Start: 2023-06-19

## 2023-06-19 RX ORDER — HYDROXYZINE HYDROCHLORIDE 25 MG/1
TABLET, FILM COATED ORAL
Qty: 180 TABLET | Refills: 1 | Status: SHIPPED | OUTPATIENT
Start: 2023-06-19

## 2023-06-19 RX ORDER — GABAPENTIN 300 MG/1
300 CAPSULE ORAL NIGHTLY
Qty: 30 CAPSULE | Refills: 1 | Status: SHIPPED | OUTPATIENT
Start: 2023-06-19 | End: 2023-12-16

## 2023-06-19 RX ORDER — OXYCODONE HYDROCHLORIDE AND ACETAMINOPHEN 5; 325 MG/1; MG/1
1 TABLET ORAL EVERY 4 HOURS PRN
Qty: 30 TABLET | Refills: 0 | Status: SHIPPED | OUTPATIENT
Start: 2023-06-19 | End: 2023-06-26

## 2023-06-19 RX ORDER — VALACYCLOVIR HYDROCHLORIDE 500 MG/1
500 TABLET, FILM COATED ORAL DAILY
Qty: 90 TABLET | Refills: 3 | Status: SHIPPED | OUTPATIENT
Start: 2023-06-19

## 2023-06-19 RX ORDER — CITALOPRAM 20 MG/1
20 TABLET ORAL DAILY
Qty: 90 TABLET | Refills: 1 | Status: SHIPPED | OUTPATIENT
Start: 2023-06-19

## 2023-06-19 RX ORDER — PHENTERMINE HYDROCHLORIDE 37.5 MG/1
37.5 TABLET ORAL
Qty: 30 TABLET | Refills: 0 | Status: SHIPPED | OUTPATIENT
Start: 2023-06-19 | End: 2023-07-19

## 2023-06-19 RX ORDER — GABAPENTIN 100 MG/1
100 CAPSULE ORAL DAILY
Qty: 90 CAPSULE | Refills: 1 | Status: SHIPPED | OUTPATIENT
Start: 2023-06-19 | End: 2023-12-16

## 2023-06-19 ASSESSMENT — ENCOUNTER SYMPTOMS
EYE DISCHARGE: 0
DIARRHEA: 0
NAUSEA: 0
NAUSEA: 0
COUGH: 0
SHORTNESS OF BREATH: 0
WHEEZING: 0
EYE ITCHING: 0
COLOR CHANGE: 0
TROUBLE SWALLOWING: 0
SINUS PRESSURE: 0
DIARRHEA: 0
SINUS PAIN: 0
CHEST TIGHTNESS: 0
ABDOMINAL PAIN: 0
EYE REDNESS: 0
VOMITING: 0
BACK PAIN: 0
SORE THROAT: 0
SHORTNESS OF BREATH: 0

## 2023-06-19 NOTE — PROGRESS NOTES
[address] PRIMARY CARE  [facility] [address] [place]  [address]  [address] [address]  Dept: [phone]  Dept Fax: [phone]    Kristy Love is a 38 y.o. female who presents today for her medical conditions/complaintsas noted below. Kristy Love is c/o of Medication Check        HPI:     Patient presents for follow-up  Blood pressure stable  Weight is stable    Patient presents for blood pressure follow-up. Her blood pressure has improved with new medication. This seems to be working well for her. She recently just had surgery on her foot. She is having more neuropathy pain with the surgery of her right foot than the left. The gabapentin at night does help. She is taking 300 mg. The 100 mg does help during the day. She is also taking Percocet which is prescribed from her surgeon    She is doing well with the blood pressure medication. No side effects. She has taken the Valtrex. She would like to be on something for suppression    Concerned about her weight. We did previously discuss starting Adipex. She would like to start this now that her blood pressure is better and her surgery is over. She also complains of having no motivation to do anything      Past Medical History:   Diagnosis Date    Abnormal pap     \" thought it was due to yeast infection\"    Allergic     heparin    Anemia     briefly RT bleeding    Asthma     Asthma  12/15/2015    Difficult intravenous access     May need PICC team for access.     Gestational HTN 2016    History of recurrent UTIs     frequent UTI during past pregnancy    HTN (hypertension)     Hx of blood clots     small clot behind knee after surgery in  surgeries since w/o problem     Miscarriage     pt has had multiple miscarrages on at 45 weeks,20 ,18 ,14 weeks an multiple at 12 weeks    MTHFR mutation     multiple miscarriages, does not see hematology    S/P laparoscopy 2019    Spine anomaly     Curve in

## 2023-06-19 NOTE — PROGRESS NOTES
Subjective: Patient presents to the office today status-post Lapidus bunionectomy to the right foot. Patient states they have kept the dressing to the right lower extremity clean, dry, and intact since the surgery. Patient states that they have remained nonweightbearing to the right lower extremity as instructed since her surgery. Patient states that their pain has been well-controlled with the prescribed pain medication. However, patient is requesting additional pain medication as she is out. Patient states that they have been taking their oral antibiotics as prescribed. Patient denies any fever, chills, nausea, vomiting, or night sweats. Patient is requesting a handicap placard. Review of Systems   Constitutional:  Negative for activity change, appetite change, chills, diaphoresis, fatigue and fever. Respiratory:  Negative for shortness of breath. Cardiovascular:  Negative for leg swelling. Gastrointestinal:  Negative for diarrhea and nausea. Endocrine: Negative for cold intolerance, heat intolerance and polyuria. Musculoskeletal:  Positive for gait problem and joint swelling. Negative for arthralgias, back pain and myalgias. Skin:  Positive for wound. Negative for color change, pallor and rash. Allergic/Immunologic: Negative for environmental allergies and food allergies. Neurological:  Negative for dizziness, weakness, light-headedness and numbness. Hematological:  Does not bruise/bleed easily. Psychiatric/Behavioral:  Negative for behavioral problems, confusion and self-injury. The patient is not nervous/anxious. Objective: Clinical evaluation of the patient reveals dressing to the right lower extremity to be clean, dry, and intact. Removal of the dressing reveals incision to be well-coapted and the sutures intact. There is erythema surrounding the incision site, but it does not extend beyond this area. There is no calor, drainage, or malodor noted to the surgical site.  There is

## 2023-06-22 RX ORDER — CITALOPRAM HYDROBROMIDE 10 MG/1
TABLET ORAL
Qty: 30 TABLET | Refills: 3 | OUTPATIENT
Start: 2023-06-22

## 2023-06-26 ENCOUNTER — PATIENT MESSAGE (OUTPATIENT)
Dept: PRIMARY CARE CLINIC | Age: 43
End: 2023-06-26

## 2023-06-26 DIAGNOSIS — G62.9 NEUROPATHY: ICD-10-CM

## 2023-06-26 PROBLEM — M79.671 RIGHT FOOT PAIN: Status: ACTIVE | Noted: 2023-06-26

## 2023-06-26 PROBLEM — M20.11 HALLUX VALGUS OF RIGHT FOOT: Status: ACTIVE | Noted: 2023-06-26

## 2023-06-26 RX ORDER — GABAPENTIN 300 MG/1
300 CAPSULE ORAL NIGHTLY
Qty: 90 CAPSULE | Refills: 1 | Status: SHIPPED | OUTPATIENT
Start: 2023-06-26 | End: 2023-12-23

## 2023-06-26 RX ORDER — GABAPENTIN 100 MG/1
100 CAPSULE ORAL DAILY
Qty: 90 CAPSULE | Refills: 1 | Status: SHIPPED | OUTPATIENT
Start: 2023-06-26 | End: 2023-12-23

## 2023-06-29 ENCOUNTER — OFFICE VISIT (OUTPATIENT)
Dept: PODIATRY | Age: 43
End: 2023-06-29

## 2023-06-29 VITALS — BODY MASS INDEX: 34.07 KG/M2 | WEIGHT: 230 LBS | HEIGHT: 69 IN

## 2023-06-29 DIAGNOSIS — M20.11 HALLUX VALGUS, RIGHT: Primary | ICD-10-CM

## 2023-06-29 DIAGNOSIS — M79.671 PAIN IN RIGHT FOOT: ICD-10-CM

## 2023-06-29 DIAGNOSIS — R60.0 EDEMA, LOWER EXTREMITY: ICD-10-CM

## 2023-06-29 DIAGNOSIS — Z98.890 POST-OPERATIVE STATE: ICD-10-CM

## 2023-06-29 PROCEDURE — 99024 POSTOP FOLLOW-UP VISIT: CPT | Performed by: PODIATRIST

## 2023-06-29 ASSESSMENT — ENCOUNTER SYMPTOMS
NAUSEA: 0
SHORTNESS OF BREATH: 0
BACK PAIN: 0
DIARRHEA: 0
COLOR CHANGE: 0

## 2023-07-25 ENCOUNTER — OFFICE VISIT (OUTPATIENT)
Dept: PRIMARY CARE CLINIC | Age: 43
End: 2023-07-25
Payer: COMMERCIAL

## 2023-07-25 VITALS
WEIGHT: 223.4 LBS | OXYGEN SATURATION: 96 % | HEART RATE: 63 BPM | SYSTOLIC BLOOD PRESSURE: 128 MMHG | RESPIRATION RATE: 16 BRPM | DIASTOLIC BLOOD PRESSURE: 76 MMHG | BODY MASS INDEX: 32.99 KG/M2

## 2023-07-25 DIAGNOSIS — I10 PRIMARY HYPERTENSION: Primary | ICD-10-CM

## 2023-07-25 DIAGNOSIS — E66.09 CLASS 1 OBESITY DUE TO EXCESS CALORIES WITH SERIOUS COMORBIDITY AND BODY MASS INDEX (BMI) OF 33.0 TO 33.9 IN ADULT: ICD-10-CM

## 2023-07-25 DIAGNOSIS — G62.9 NEUROPATHY: ICD-10-CM

## 2023-07-25 DIAGNOSIS — Z98.890 S/P FOOT SURGERY, RIGHT: ICD-10-CM

## 2023-07-25 DIAGNOSIS — F41.9 ANXIETY: ICD-10-CM

## 2023-07-25 PROCEDURE — 3078F DIAST BP <80 MM HG: CPT | Performed by: NURSE PRACTITIONER

## 2023-07-25 PROCEDURE — 99214 OFFICE O/P EST MOD 30 MIN: CPT | Performed by: NURSE PRACTITIONER

## 2023-07-25 PROCEDURE — 3074F SYST BP LT 130 MM HG: CPT | Performed by: NURSE PRACTITIONER

## 2023-07-25 RX ORDER — PHENTERMINE HYDROCHLORIDE 37.5 MG/1
37.5 TABLET ORAL
Qty: 30 TABLET | Refills: 0 | Status: SHIPPED | OUTPATIENT
Start: 2023-07-25 | End: 2023-08-24

## 2023-07-25 RX ORDER — PROPRANOLOL HYDROCHLORIDE 20 MG/1
20 TABLET ORAL 3 TIMES DAILY
Qty: 90 TABLET | Refills: 3 | Status: SHIPPED | OUTPATIENT
Start: 2023-07-25

## 2023-07-25 RX ORDER — GABAPENTIN 300 MG/1
300 CAPSULE ORAL NIGHTLY
Qty: 90 CAPSULE | Refills: 1 | Status: SHIPPED | OUTPATIENT
Start: 2023-07-25 | End: 2024-01-21

## 2023-07-25 RX ORDER — GABAPENTIN 300 MG/1
300 CAPSULE ORAL NIGHTLY
Qty: 90 CAPSULE | Refills: 1 | Status: SHIPPED | OUTPATIENT
Start: 2023-07-25 | End: 2023-07-25 | Stop reason: SDUPTHER

## 2023-07-25 ASSESSMENT — ENCOUNTER SYMPTOMS
SORE THROAT: 0
WHEEZING: 0
SINUS PAIN: 0
SINUS PRESSURE: 0
COUGH: 0
EYE DISCHARGE: 0
NAUSEA: 0
DIARRHEA: 0
TROUBLE SWALLOWING: 0
CHEST TIGHTNESS: 0
EYE ITCHING: 0
ABDOMINAL PAIN: 0
VOMITING: 0
EYE REDNESS: 0
SHORTNESS OF BREATH: 0

## 2023-07-25 ASSESSMENT — PATIENT HEALTH QUESTIONNAIRE - PHQ9
1. LITTLE INTEREST OR PLEASURE IN DOING THINGS: 0
2. FEELING DOWN, DEPRESSED OR HOPELESS: 0
SUM OF ALL RESPONSES TO PHQ QUESTIONS 1-9: 0
SUM OF ALL RESPONSES TO PHQ9 QUESTIONS 1 & 2: 0
SUM OF ALL RESPONSES TO PHQ QUESTIONS 1-9: 0

## 2023-07-25 NOTE — PROGRESS NOTES
Dispense:  30 tablet     Refill:  0    gabapentin (NEURONTIN) 300 MG capsule     Sig: Take 1 capsule by mouth nightly for 180 days. Intended supply: 30 days     Dispense:  90 capsule     Refill:  1    propranolol (INDERAL) 20 MG tablet     Sig: Take 1 tablet by mouth 3 times daily     Dispense:  90 tablet     Refill:  3       Patient given educational materials - seepatient instructions. Discussed use, benefit, and side effects of prescribed medications. All patient questions answered. Pt voiced understanding. Reviewed health maintenance. Instructed to continue current medications, diet and exercise. Patient agreedwith treatment plan. Follow up as directed.       Electronically signed by CHERELLE Schultz CNP on 7/25/2023at 3:17 PM

## 2023-07-27 ENCOUNTER — OFFICE VISIT (OUTPATIENT)
Dept: PODIATRY | Age: 43
End: 2023-07-27

## 2023-07-27 VITALS — HEIGHT: 69 IN | BODY MASS INDEX: 33.03 KG/M2 | WEIGHT: 223 LBS

## 2023-07-27 DIAGNOSIS — M20.11 HALLUX VALGUS, RIGHT: Primary | ICD-10-CM

## 2023-07-27 DIAGNOSIS — Z98.890 POST-OPERATIVE STATE: ICD-10-CM

## 2023-07-27 DIAGNOSIS — M79.671 PAIN IN RIGHT FOOT: ICD-10-CM

## 2023-07-27 DIAGNOSIS — R60.0 EDEMA, LOWER EXTREMITY: ICD-10-CM

## 2023-07-27 PROCEDURE — 99024 POSTOP FOLLOW-UP VISIT: CPT | Performed by: PODIATRIST

## 2023-07-27 ASSESSMENT — ENCOUNTER SYMPTOMS
SHORTNESS OF BREATH: 0
NAUSEA: 0
DIARRHEA: 0
COLOR CHANGE: 0
BACK PAIN: 0

## 2023-07-27 NOTE — PROGRESS NOTES
[id number] [facility]  Return Patient Progress Note    Subjective: Luis Blount 38 y.o. female that presents for follow up evaluation of Lapidus bunionectomy right foot. Chief Complaint   Patient presents with    Post-Op Check     Post op right foot, doing good     Patient's treatment thus far has included nonweightbearing right lower extremity. Pain is rated 0 out of 10 and is described as none. Patient has been following my prescribed course of therapy as instructed. Review of Systems   Constitutional:  Negative for activity change, appetite change, chills, diaphoresis, fatigue and fever. Respiratory:  Negative for shortness of breath. Cardiovascular:  Negative for leg swelling. Gastrointestinal:  Negative for diarrhea and nausea. Endocrine: Negative for cold intolerance, heat intolerance and polyuria. Musculoskeletal:  Positive for gait problem and joint swelling. Negative for arthralgias, back pain and myalgias. Skin:  Negative for color change, pallor, rash and wound. Allergic/Immunologic: Negative for environmental allergies and food allergies. Neurological:  Negative for dizziness, weakness, light-headedness and numbness. Hematological:  Does not bruise/bleed easily. Psychiatric/Behavioral:  Negative for behavioral problems, confusion and self-injury. The patient is not nervous/anxious. Objective: Clinical evaluation of the patient reveals adequate alignment to the first metatarsal of the right foot. There is some abduction of the hallux noted at the level of the MTPJ today. This abduction has increased since last visit, which there was none. Surgical incision remains adequately healed to the right foot. There is limited range of motion noted to the right first MTPJ. There remains edema to the right foot, but there is no erythema or calor noted. X-ray's taken: AP, Lateral, and Medial Oblique of the right foot. Findings:  There is adequate reduction of the first

## 2023-08-29 ENCOUNTER — OFFICE VISIT (OUTPATIENT)
Dept: PRIMARY CARE CLINIC | Age: 43
End: 2023-08-29
Payer: COMMERCIAL

## 2023-08-29 VITALS
BODY MASS INDEX: 32.7 KG/M2 | OXYGEN SATURATION: 98 % | HEART RATE: 64 BPM | SYSTOLIC BLOOD PRESSURE: 126 MMHG | RESPIRATION RATE: 14 BRPM | DIASTOLIC BLOOD PRESSURE: 68 MMHG | WEIGHT: 221.4 LBS

## 2023-08-29 DIAGNOSIS — F41.9 ANXIETY: Primary | ICD-10-CM

## 2023-08-29 DIAGNOSIS — B96.89 BACTERIAL VAGINOSIS: ICD-10-CM

## 2023-08-29 DIAGNOSIS — N76.0 BACTERIAL VAGINOSIS: ICD-10-CM

## 2023-08-29 DIAGNOSIS — I10 PRIMARY HYPERTENSION: ICD-10-CM

## 2023-08-29 DIAGNOSIS — E66.09 CLASS 1 OBESITY DUE TO EXCESS CALORIES WITH SERIOUS COMORBIDITY AND BODY MASS INDEX (BMI) OF 33.0 TO 33.9 IN ADULT: ICD-10-CM

## 2023-08-29 DIAGNOSIS — B07.8 OTHER VIRAL WARTS: ICD-10-CM

## 2023-08-29 DIAGNOSIS — B00.9 HSV-2 (HERPES SIMPLEX VIRUS 2) INFECTION: ICD-10-CM

## 2023-08-29 PROCEDURE — 3074F SYST BP LT 130 MM HG: CPT | Performed by: NURSE PRACTITIONER

## 2023-08-29 PROCEDURE — 99214 OFFICE O/P EST MOD 30 MIN: CPT | Performed by: NURSE PRACTITIONER

## 2023-08-29 PROCEDURE — 3078F DIAST BP <80 MM HG: CPT | Performed by: NURSE PRACTITIONER

## 2023-08-29 RX ORDER — METRONIDAZOLE 500 MG/1
500 TABLET ORAL 2 TIMES DAILY
Qty: 14 TABLET | Refills: 0 | Status: SHIPPED | OUTPATIENT
Start: 2023-08-29 | End: 2023-09-05

## 2023-08-29 RX ORDER — PHENTERMINE HYDROCHLORIDE 37.5 MG/1
37.5 TABLET ORAL
Qty: 30 TABLET | Refills: 0 | Status: SHIPPED | OUTPATIENT
Start: 2023-08-29 | End: 2023-09-28

## 2023-08-29 RX ORDER — ROPINIROLE 0.5 MG/1
0.5 TABLET, FILM COATED ORAL NIGHTLY
Qty: 30 TABLET | Refills: 1 | Status: SHIPPED | OUTPATIENT
Start: 2023-08-29

## 2023-08-29 RX ORDER — BUPROPION HYDROCHLORIDE 300 MG/1
300 TABLET ORAL EVERY MORNING
Qty: 30 TABLET | Refills: 1 | Status: SHIPPED | OUTPATIENT
Start: 2023-08-29

## 2023-08-29 ASSESSMENT — PATIENT HEALTH QUESTIONNAIRE - PHQ9
SUM OF ALL RESPONSES TO PHQ QUESTIONS 1-9: 0
2. FEELING DOWN, DEPRESSED OR HOPELESS: 0
1. LITTLE INTEREST OR PLEASURE IN DOING THINGS: 0
SUM OF ALL RESPONSES TO PHQ9 QUESTIONS 1 & 2: 0
SUM OF ALL RESPONSES TO PHQ QUESTIONS 1-9: 0

## 2023-08-29 ASSESSMENT — ENCOUNTER SYMPTOMS
NAUSEA: 0
VOMITING: 0
SINUS PAIN: 0
SHORTNESS OF BREATH: 0
EYE REDNESS: 0
SORE THROAT: 0
SINUS PRESSURE: 0
EYE DISCHARGE: 0
WHEEZING: 0
CHEST TIGHTNESS: 0
TROUBLE SWALLOWING: 0
ABDOMINAL PAIN: 0
DIARRHEA: 0
EYE ITCHING: 0
COUGH: 0

## 2023-08-31 RX ORDER — BUPROPION HYDROCHLORIDE 150 MG/1
150 TABLET ORAL EVERY MORNING
Qty: 30 TABLET | Refills: 3 | OUTPATIENT
Start: 2023-08-31

## 2023-09-28 ENCOUNTER — OFFICE VISIT (OUTPATIENT)
Dept: PRIMARY CARE CLINIC | Age: 43
End: 2023-09-28
Payer: COMMERCIAL

## 2023-09-28 VITALS
OXYGEN SATURATION: 97 % | BODY MASS INDEX: 33.55 KG/M2 | DIASTOLIC BLOOD PRESSURE: 70 MMHG | WEIGHT: 227.2 LBS | RESPIRATION RATE: 12 BRPM | SYSTOLIC BLOOD PRESSURE: 120 MMHG | HEART RATE: 69 BPM

## 2023-09-28 DIAGNOSIS — Z98.890 S/P FOOT SURGERY, RIGHT: ICD-10-CM

## 2023-09-28 DIAGNOSIS — I10 PRIMARY HYPERTENSION: Primary | ICD-10-CM

## 2023-09-28 DIAGNOSIS — F41.9 ANXIETY: ICD-10-CM

## 2023-09-28 DIAGNOSIS — G89.18 POST-OP PAIN: ICD-10-CM

## 2023-09-28 DIAGNOSIS — E66.09 CLASS 1 OBESITY DUE TO EXCESS CALORIES WITH SERIOUS COMORBIDITY AND BODY MASS INDEX (BMI) OF 33.0 TO 33.9 IN ADULT: ICD-10-CM

## 2023-09-28 DIAGNOSIS — G47.00 INSOMNIA, UNSPECIFIED TYPE: ICD-10-CM

## 2023-09-28 DIAGNOSIS — B00.9 HSV-2 (HERPES SIMPLEX VIRUS 2) INFECTION: ICD-10-CM

## 2023-09-28 DIAGNOSIS — G62.9 NEUROPATHY: ICD-10-CM

## 2023-09-28 DIAGNOSIS — B07.8 OTHER VIRAL WARTS: ICD-10-CM

## 2023-09-28 PROCEDURE — 3078F DIAST BP <80 MM HG: CPT | Performed by: NURSE PRACTITIONER

## 2023-09-28 PROCEDURE — 3074F SYST BP LT 130 MM HG: CPT | Performed by: NURSE PRACTITIONER

## 2023-09-28 PROCEDURE — 99214 OFFICE O/P EST MOD 30 MIN: CPT | Performed by: NURSE PRACTITIONER

## 2023-09-28 RX ORDER — TRAZODONE HYDROCHLORIDE 50 MG/1
50 TABLET ORAL NIGHTLY PRN
Qty: 30 TABLET | Refills: 5 | Status: SHIPPED | OUTPATIENT
Start: 2023-09-28

## 2023-09-28 RX ORDER — IBUPROFEN 800 MG/1
800 TABLET ORAL EVERY 8 HOURS PRN
Qty: 270 TABLET | Refills: 1 | Status: SHIPPED | OUTPATIENT
Start: 2023-09-28

## 2023-09-28 RX ORDER — LOSARTAN POTASSIUM 25 MG/1
25 TABLET ORAL DAILY
Qty: 90 TABLET | Refills: 1 | Status: SHIPPED | OUTPATIENT
Start: 2023-09-28

## 2023-09-28 RX ORDER — HYDROXYZINE HYDROCHLORIDE 25 MG/1
TABLET, FILM COATED ORAL
Qty: 180 TABLET | Refills: 1 | Status: SHIPPED | OUTPATIENT
Start: 2023-09-28

## 2023-09-28 RX ORDER — GABAPENTIN 100 MG/1
100 CAPSULE ORAL DAILY
Qty: 90 CAPSULE | Refills: 1 | Status: SHIPPED | OUTPATIENT
Start: 2023-09-28 | End: 2024-03-26

## 2023-09-28 RX ORDER — PHENTERMINE HYDROCHLORIDE 37.5 MG/1
37.5 TABLET ORAL
Qty: 30 TABLET | Refills: 0 | Status: SHIPPED | OUTPATIENT
Start: 2023-09-28 | End: 2023-10-28

## 2023-09-28 RX ORDER — CITALOPRAM 20 MG/1
20 TABLET ORAL DAILY
Qty: 90 TABLET | Refills: 1 | Status: SHIPPED | OUTPATIENT
Start: 2023-09-28

## 2023-09-28 ASSESSMENT — ENCOUNTER SYMPTOMS
SORE THROAT: 0
EYE ITCHING: 0
TROUBLE SWALLOWING: 0
VOMITING: 0
WHEEZING: 0
SINUS PRESSURE: 0
SINUS PAIN: 0
DIARRHEA: 0
EYE REDNESS: 0
EYE DISCHARGE: 0
COLOR CHANGE: 1
COUGH: 0
CHEST TIGHTNESS: 0
SHORTNESS OF BREATH: 0
ABDOMINAL PAIN: 0
NAUSEA: 0

## 2023-09-28 ASSESSMENT — PATIENT HEALTH QUESTIONNAIRE - PHQ9
SUM OF ALL RESPONSES TO PHQ9 QUESTIONS 1 & 2: 0
SUM OF ALL RESPONSES TO PHQ QUESTIONS 1-9: 0
SUM OF ALL RESPONSES TO PHQ QUESTIONS 1-9: 0
2. FEELING DOWN, DEPRESSED OR HOPELESS: 0
1. LITTLE INTEREST OR PLEASURE IN DOING THINGS: 0
SUM OF ALL RESPONSES TO PHQ QUESTIONS 1-9: 0
SUM OF ALL RESPONSES TO PHQ QUESTIONS 1-9: 0

## 2023-09-28 NOTE — PROGRESS NOTES
dermatology. She also has skin tags that she would like to be addressed  4. HSV-2  She is to continue with Valtrex as prescribed  5. Anxiety  Continue with propanolol 20 mg 3 times a day  Continue with hydroxyzine 25 mg 3 times a day as needed  Continue with citalopram 20 mg daily  Continue with Wellbutrin 300 mg extended release  6-8. Postoperative pain, status post right foot surgery and neuropathy  Patient is on gabapentin 100 mg during the day and 300 mg at night  9. Insomnia  We will start trazodone 50 mg nightly    Patient is going to follow-up in 1 month  Orders Placed This Encounter   Medications    phentermine (ADIPEX-P) 37.5 MG tablet     Sig: Take 1 tablet by mouth every morning (before breakfast) for 30 days. Max Daily Amount: 37.5 mg     Dispense:  30 tablet     Refill:  0    losartan (COZAAR) 25 MG tablet     Sig: Take 1 tablet by mouth daily     Dispense:  90 tablet     Refill:  1    citalopram (CELEXA) 20 MG tablet     Sig: Take 1 tablet by mouth daily     Dispense:  90 tablet     Refill:  1    gabapentin (NEURONTIN) 100 MG capsule     Sig: Take 1 capsule by mouth daily for 180 days. Intended supply: 30 days     Dispense:  90 capsule     Refill:  1    hydrOXYzine HCl (ATARAX) 25 MG tablet     Sig: TAKE  2 TABLETS BY MOUTH AT BEDTIME AS NEEDED     Dispense:  180 tablet     Refill:  1    ibuprofen (ADVIL;MOTRIN) 800 MG tablet     Sig: Take 1 tablet by mouth every 8 hours as needed for Pain     Dispense:  270 tablet     Refill:  1    traZODone (DESYREL) 50 MG tablet     Sig: Take 1 tablet by mouth nightly as needed for Sleep     Dispense:  30 tablet     Refill:  5       Patient given educational materials - seepatient instructions. Discussed use, benefit, and side effects of prescribed medications. All patient questions answered. Pt voiced understanding. Reviewed health maintenance. Instructed to continue current medications, diet and exercise. Patient agreedwith treatment plan.  Follow up as

## 2023-10-14 ENCOUNTER — PATIENT MESSAGE (OUTPATIENT)
Dept: PRIMARY CARE CLINIC | Age: 43
End: 2023-10-14

## 2023-10-16 RX ORDER — TRAZODONE HYDROCHLORIDE 50 MG/1
50 TABLET ORAL NIGHTLY PRN
Qty: 30 TABLET | Refills: 5 | Status: SHIPPED | OUTPATIENT
Start: 2023-10-16

## 2023-11-01 ENCOUNTER — OFFICE VISIT (OUTPATIENT)
Dept: PRIMARY CARE CLINIC | Age: 43
End: 2023-11-01
Payer: COMMERCIAL

## 2023-11-01 VITALS
HEART RATE: 84 BPM | WEIGHT: 233.6 LBS | SYSTOLIC BLOOD PRESSURE: 106 MMHG | DIASTOLIC BLOOD PRESSURE: 76 MMHG | RESPIRATION RATE: 18 BRPM | BODY MASS INDEX: 34.5 KG/M2 | OXYGEN SATURATION: 100 %

## 2023-11-01 DIAGNOSIS — G25.81 RLS (RESTLESS LEGS SYNDROME): ICD-10-CM

## 2023-11-01 DIAGNOSIS — M25.512 ACUTE PAIN OF BOTH SHOULDERS: Primary | ICD-10-CM

## 2023-11-01 DIAGNOSIS — G47.00 INSOMNIA, UNSPECIFIED TYPE: ICD-10-CM

## 2023-11-01 DIAGNOSIS — F51.5 NIGHTMARES: ICD-10-CM

## 2023-11-01 DIAGNOSIS — E66.09 CLASS 1 OBESITY DUE TO EXCESS CALORIES WITH SERIOUS COMORBIDITY AND BODY MASS INDEX (BMI) OF 33.0 TO 33.9 IN ADULT: ICD-10-CM

## 2023-11-01 DIAGNOSIS — F41.9 ANXIETY: ICD-10-CM

## 2023-11-01 DIAGNOSIS — M79.644 THUMB PAIN, RIGHT: ICD-10-CM

## 2023-11-01 DIAGNOSIS — M25.511 ACUTE PAIN OF BOTH SHOULDERS: Primary | ICD-10-CM

## 2023-11-01 PROCEDURE — 3074F SYST BP LT 130 MM HG: CPT | Performed by: NURSE PRACTITIONER

## 2023-11-01 PROCEDURE — 3078F DIAST BP <80 MM HG: CPT | Performed by: NURSE PRACTITIONER

## 2023-11-01 PROCEDURE — 99214 OFFICE O/P EST MOD 30 MIN: CPT | Performed by: NURSE PRACTITIONER

## 2023-11-01 RX ORDER — ROPINIROLE 1 MG/1
1 TABLET, FILM COATED ORAL NIGHTLY
Qty: 30 TABLET | Refills: 1 | Status: SHIPPED | OUTPATIENT
Start: 2023-11-01

## 2023-11-01 RX ORDER — PHENTERMINE HYDROCHLORIDE 37.5 MG/1
37.5 TABLET ORAL
Qty: 30 TABLET | Refills: 0 | Status: SHIPPED | OUTPATIENT
Start: 2023-11-01 | End: 2023-12-01

## 2023-11-01 ASSESSMENT — ENCOUNTER SYMPTOMS
VOMITING: 0
SINUS PRESSURE: 0
EYE REDNESS: 0
TROUBLE SWALLOWING: 0
DIARRHEA: 0
NAUSEA: 0
CHEST TIGHTNESS: 0
SINUS PAIN: 0
SORE THROAT: 0
ABDOMINAL PAIN: 0
COUGH: 0
EYE DISCHARGE: 0
EYE ITCHING: 0
SHORTNESS OF BREATH: 0
WHEEZING: 0

## 2023-11-01 ASSESSMENT — PATIENT HEALTH QUESTIONNAIRE - PHQ9
SUM OF ALL RESPONSES TO PHQ QUESTIONS 1-9: 0
2. FEELING DOWN, DEPRESSED OR HOPELESS: 0
SUM OF ALL RESPONSES TO PHQ QUESTIONS 1-9: 0
SUM OF ALL RESPONSES TO PHQ9 QUESTIONS 1 & 2: 0
1. LITTLE INTEREST OR PLEASURE IN DOING THINGS: 0

## 2023-11-01 NOTE — PROGRESS NOTES
Confirmed with Braxton Gowers that there is a co-pay for consultation. Preadmission orders entered for pt's colonoscopy  with Dr. Nice were entered into epic with informed consent and placed in signed/held status for release on that date.

## 2023-11-01 NOTE — PROGRESS NOTES
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      Past Surgical History:   Procedure Laterality Date    APPENDECTOMY      laparoscopy--also 2 left one right ovarian cystectomies    BUNIONECTOMY Left 2023    LAPIDUS FOOT BUNIONECTOMY WITH LATERAL RELEASE performed by Sukumar Schneider DPM at [address] Right 2023    LAPIDUS FOOT BUNIONECTOMY RIGHT performed by Sukumar Schneider DPM at [address] Bilateral     two cyst on left one on right ovaries    DILATION AND CURETTAGE OF UTERUS  14    DILATION AND CURETTAGE OF UTERUS  2017    HYSTERECTOMY ABDOMINAL N/A 2019    XI LAPAROSCOPIC ROBOTIC ABDOMINAL HYSTERECTOMY, BILATERAL SALPINGECTOMY, LEFT OVARIAN CYSTECTOMY, CYSTOSCOPY performed by Monico Erickson MD at [facility]  2014    exploratory laparoscopy    LASIK Bilateral 2013    OTHER SURGICAL HISTORY Bilateral 2019    XI LAPAROSCOPIC ROBOTIC ABDOMINAL HYSTERECTOMY, BILATERAL SALPINGECTOMY, LEFT OVARIAN CYSTECTOMY, CYSTOSCOPY    DE INDUCED  DILATION & EVACUATION N/A 2017    DILATATION AND CURETTAGE SUCTION performed by Liza Mayen MD at [address]  2000       Family History   Problem Relation Age of Onset    Diabetes Mother     High Blood Pressure Father     Cancer Paternal Uncle 46        throat/ smoked and drank    Clotting Disorder Maternal Grandmother         blood clots    Heart Attack Maternal Grandfather 59         MI    Alzheimer's Disease Paternal Grandmother     Macular Degen Paternal Grandfather     Heart Attack Paternal Grandfather 76        MI       Social History     Tobacco Use    Smoking status: Never    Smokeless tobacco: Never   Substance Use Topics    Alcohol use:  Yes     Alcohol/week: 0.0 standard drinks of

## 2023-11-16 ENCOUNTER — OFFICE VISIT (OUTPATIENT)
Dept: PODIATRY | Age: 43
End: 2023-11-16
Payer: COMMERCIAL

## 2023-11-16 DIAGNOSIS — R52 PAIN IN SURGICAL SCAR: Primary | ICD-10-CM

## 2023-11-16 DIAGNOSIS — L90.5 PAIN IN SURGICAL SCAR: Primary | ICD-10-CM

## 2023-11-16 DIAGNOSIS — M79.671 PAIN IN RIGHT FOOT: ICD-10-CM

## 2023-11-16 PROCEDURE — 99213 OFFICE O/P EST LOW 20 MIN: CPT | Performed by: PODIATRIST

## 2023-11-16 ASSESSMENT — ENCOUNTER SYMPTOMS
DIARRHEA: 0
BACK PAIN: 0
SHORTNESS OF BREATH: 0
COLOR CHANGE: 0
NAUSEA: 0

## 2023-11-16 NOTE — PROGRESS NOTES
[id number] [facility]  Return Patient Progress Note    Subjective: Layo Quiroga 41 y.o. female that presents with chief complaint of pain to the right midfoot. Chief Complaint   Patient presents with    Foot Pain     right foot    Patient states that this pain has been present since her surgery. Pain is rated 5 out of 10 and is described as intermittent. Patient has had physical therapy and has used deep tissue massage with vitamin E oil, but her pain remains. Review of Systems   Constitutional:  Negative for activity change, appetite change, chills, diaphoresis, fatigue and fever. Respiratory:  Negative for shortness of breath. Cardiovascular:  Negative for leg swelling. Gastrointestinal:  Negative for diarrhea and nausea. Endocrine: Negative for cold intolerance, heat intolerance and polyuria. Musculoskeletal:  Negative for arthralgias, back pain, gait problem, joint swelling and myalgias. Skin:  Negative for color change, pallor, rash and wound. Allergic/Immunologic: Negative for environmental allergies and food allergies. Neurological:  Negative for dizziness, weakness, light-headedness and numbness. Hematological:  Does not bruise/bleed easily. Psychiatric/Behavioral:  Negative for behavioral problems, confusion and self-injury. The patient is not nervous/anxious. Objective: Clinical evaluation of the patient reveals pain with palpation to the right plantar midfoot at the level of the first met cuneiform joint. There is no palpable screw prominence noted to this area. There is some induration suggesting scar tissue formation to this area. There is no erythema, calor, or open wound noted to this area. There is no edema noted to the right foot. X-ray's taken: AP, Lateral, and Medial Oblique of the right foot. Findings: There is no prominence of the either screw to this area of the foot. Assessment:    Diagnosis Orders   1.  Pain in surgical scar  XR FOOT RIGHT (MIN 3

## 2023-11-18 RX ORDER — ROPINIROLE 0.5 MG/1
0.5 TABLET, FILM COATED ORAL NIGHTLY
Qty: 30 TABLET | Refills: 1 | OUTPATIENT
Start: 2023-11-18

## 2023-11-22 ENCOUNTER — HOSPITAL ENCOUNTER (OUTPATIENT)
Dept: PREADMISSION TESTING | Age: 43
Discharge: HOME | End: 2023-11-22
Payer: COMMERCIAL

## 2023-11-22 VITALS
WEIGHT: 225 LBS | BODY MASS INDEX: 33.33 KG/M2 | HEIGHT: 69 IN | TEMPERATURE: 98.2 F | HEART RATE: 61 BPM | DIASTOLIC BLOOD PRESSURE: 78 MMHG | OXYGEN SATURATION: 98 % | RESPIRATION RATE: 16 BRPM | SYSTOLIC BLOOD PRESSURE: 127 MMHG

## 2023-11-22 LAB
ANION GAP SERPL CALCULATED.3IONS-SCNC: 13 MMOL/L (ref 9–17)
BASOPHILS # BLD: 0.1 K/UL (ref 0–0.2)
BASOPHILS NFR BLD: 1 % (ref 0–2)
BUN SERPL-MCNC: 13 MG/DL (ref 6–20)
CALCIUM SERPL-MCNC: 9 MG/DL (ref 8.6–10.4)
CHLORIDE SERPL-SCNC: 104 MMOL/L (ref 98–107)
CO2 SERPL-SCNC: 20 MMOL/L (ref 20–31)
CREAT SERPL-MCNC: 0.8 MG/DL (ref 0.5–0.9)
EOSINOPHIL # BLD: 0.1 K/UL (ref 0–0.4)
EOSINOPHILS RELATIVE PERCENT: 2 % (ref 0–4)
ERYTHROCYTE [DISTWIDTH] IN BLOOD BY AUTOMATED COUNT: 13.5 % (ref 11.5–14.9)
GFR SERPL CREATININE-BSD FRML MDRD: >60 ML/MIN/1.73M2
GLUCOSE SERPL-MCNC: 106 MG/DL (ref 70–99)
HCT VFR BLD AUTO: 42 % (ref 36–46)
HGB BLD-MCNC: 13.9 G/DL (ref 12–16)
LYMPHOCYTES NFR BLD: 2.3 K/UL (ref 1–4.8)
LYMPHOCYTES RELATIVE PERCENT: 32 % (ref 24–44)
MCH RBC QN AUTO: 28.8 PG (ref 26–34)
MCHC RBC AUTO-ENTMCNC: 33.1 G/DL (ref 31–37)
MCV RBC AUTO: 87 FL (ref 80–100)
MONOCYTES NFR BLD: 0.6 K/UL (ref 0.1–1.3)
MONOCYTES NFR BLD: 8 % (ref 1–7)
NEUTROPHILS NFR BLD: 57 % (ref 36–66)
NEUTS SEG NFR BLD: 4.1 K/UL (ref 1.3–9.1)
PLATELET # BLD AUTO: 276 K/UL (ref 150–450)
PMV BLD AUTO: 9.1 FL (ref 6–12)
POTASSIUM SERPL-SCNC: 4.8 MMOL/L (ref 3.7–5.3)
RBC # BLD AUTO: 4.83 M/UL (ref 4–5.2)
SODIUM SERPL-SCNC: 137 MMOL/L (ref 135–144)
WBC OTHER # BLD: 7.1 K/UL (ref 3.5–11)

## 2023-11-22 PROCEDURE — 80048 BASIC METABOLIC PNL TOTAL CA: CPT

## 2023-11-22 PROCEDURE — 85025 COMPLETE CBC W/AUTO DIFF WBC: CPT

## 2023-11-22 PROCEDURE — 93005 ELECTROCARDIOGRAM TRACING: CPT

## 2023-11-22 ASSESSMENT — ENCOUNTER SYMPTOMS
NAUSEA: 0
DIARRHEA: 0
VOMITING: 0
ABDOMINAL PAIN: 0
BACK PAIN: 0
COUGH: 0
SHORTNESS OF BREATH: 0
CONSTIPATION: 0
SORE THROAT: 0

## 2023-11-22 NOTE — DISCHARGE INSTRUCTIONS
Pre-op Instructions For Out-Patient Surgery    Medication Instructions:  Please stop herbs and any supplements now (includes vitamins and minerals). Please contact your surgeon and prescribing physician for pre-op instructions for any blood thinners. Ibuprofen and meloxicam as directed. If you have inhalers/aerosol treatments at home, please use them the morning of your surgery and bring the inhalers with you to the hospital.    Please take the following medications the morning of your surgery with a sip of water:    gabapentin, propranolol    Surgery Instructions:  After midnight before surgery:  Do not eat or drink anything, including water, mints, gum, and hard candy. You may brush your teeth without swallowing. No smoking, chewing tobacco, or street drugs. Please shower or bathe before surgery. If you were given Surgical Scrub Chlorhexidine Gluconate Liquid (CHG), please shower the night before and the morning of your surgery following the detailed instructions you received during your pre-admission visit. Please do not wear any cologne, lotion, powder, deodorant, jewelry, piercings, perfume, makeup, nail polish, hair accessories, or hair spray on the day of surgery. Wear loose comfortable clothing. Leave your valuables at home but bring a payment source for any after-surgery prescriptions you plan to fill at [facility]. Bring a storage case for any glasses/contacts. An adult who is responsible for you MUST drive you home and should be with you for the first 24 hours after surgery. If having out-patient knee and foot surgeries, please arrange for planned crutches, walker, or wheelchair before arriving to the hospital.    The Day of Surgery:  Arrive at [facility] at the time directed by your surgeon and check in at the desk. If you have a living will or healthcare power of , please bring a copy.     You

## 2023-11-22 NOTE — H&P
HISTORY and [address]       NAME:  Layo Quiroga  MRN: [id number]   YOB: 1980   Date: 11/22/2023   Age: 41 y.o. Gender: female       COMPLAINT AND PRESENT HISTORY:     Layo Quiroga is 41 y.o.  female, here for preadmission testing related to contracture of toe joint with scheduled CAPSOLOTOMY BILATERAL 1ST METATARSOPHALANGEAL JOINT (BIG TOE) bilateral per Dr. Leticia Freed. Pt s/p left bunionectomy in January, 2023 and right bunionectomy in June, 2023. Below italics a portion of podiatry office visit note by Dr. Leticia Freed dated 11/16/23: Reviewed  Subjective: Layo Quiroga 41 y.o. female that presents with chief complaint of pain to the right midfoot. Chief Complaint   Patient presents with    Foot Pain       right foot    Patient states that this pain has been present since her surgery. Pain is rated 5 out of 10 and is described as intermittent. Patient has had physical therapy and has used deep tissue massage with vitamin E oil, but her pain remains. UPDATE: Pt reports surgery was to be just left foot according to how she understands. She is going to call Dr. Anderson Sat office to determine specifics prior to surgery date. Pt denies pain to left foot. Pt has migration of left great toe toward second toe. Pt reports she did complete physical therapy with bilateral feet but not currently. Denies pain with walking or standing. Denies hx of MRSA infection. Surgical incisions to bilateral feet well healed. PMHx includes:    Asthma: Associated medications include: none  Denies cough, SOB, wheezing, chest tightness. HTN:  Associated medications include: losartan, propanolol (for anxiety)  Denies recent or current chest pain/pressure, palpitations, SOB, headaches, dizziness, lower extremity edema.      BP Readings from Last 3 Encounters:   11/22/23 127/78   11/01/23 106/76   09/28/23 120/70     MTHFR, blood clots, several miscarriages:   Associated discharge. Left eye: No discharge. Pupils: Pupils are equal, round, and reactive to light. Neck:      Trachea: No tracheal deviation. Cardiovascular:      Rate and Rhythm: Normal rate and regular rhythm. Pulses:           Dorsalis pedis pulses are 2+ on the right side and 2+ on the left side. Posterior tibial pulses are 2+ on the right side and 2+ on the left side. Heart sounds: Normal heart sounds. No murmur heard. No friction rub. No gallop. Pulmonary:      Effort: Pulmonary effort is normal. No respiratory distress. Breath sounds: Normal breath sounds. No wheezing, rhonchi or rales. Abdominal:      General: Bowel sounds are normal. There is no distension. Palpations: Abdomen is soft. Tenderness: There is no abdominal tenderness. There is no guarding. Musculoskeletal:      Cervical back: Neck supple. Right foot: Tenderness present. Left foot: Bunion present. Feet:      Right foot:      Skin integrity: No skin breakdown. Toenail Condition: Right toenails are normal.      Left foot:      Skin integrity: No skin breakdown. Toenail Condition: Left toenails are normal.   Skin:     General: Skin is warm and dry. Coloration: Skin is not jaundiced. Findings: No bruising, erythema or rash. Neurological:      General: No focal deficit present. Mental Status: She is alert and oriented to person, place, and time. Cranial Nerves: No cranial nerve deficit.       Gait: Gait normal.   Psychiatric:         Mood and Affect: Mood normal.        PROVISIONAL DIAGNOSES / SURGERY:      CAPSOLOTOMY BILATERAL 1ST METATARSOPHALANGEAL JOINT (BIG TOE)    Pre-Op Diagnosis Codes:     * Contracture of toe joint [M20.5X9]     Patient Active Problem List    Diagnosis Date Noted    Homozygous MTHFR mutation [id number] 06/27/2016    Hallux valgus, left 01/09/2023    Extension contracture of metatarsophalangeal (MTP) joint 01/09/2023    Right foot pain

## 2023-11-22 NOTE — PROGRESS NOTES
Surgery is scheduled for bilateral capsolotomy but patient is stating that it should only be the left foot, surgery scheduling was notified and Radha Burrell will call Dr Jairo Yanes office to verify.

## 2023-11-22 NOTE — H&P (VIEW-ONLY)
HISTORY and [address]       NAME:  Mynor Frazier  MRN: [id number]   YOB: 1980   Date: 11/22/2023   Age: 41 y.o. Gender: female       COMPLAINT AND PRESENT HISTORY:     Mynor Frazier is 41 y.o.  female, here for preadmission testing related to contracture of toe joint with scheduled CAPSOLOTOMY BILATERAL 1ST METATARSOPHALANGEAL JOINT (BIG TOE) bilateral per Dr. Wilman Garrett. Pt s/p left bunionectomy in January, 2023 and right bunionectomy in June, 2023. Below italics a portion of podiatry office visit note by Dr. Wilman Garrett dated 11/16/23: Reviewed  Subjective: Mynor Frazier 41 y.o. female that presents with chief complaint of pain to the right midfoot. Chief Complaint   Patient presents with    Foot Pain       right foot    Patient states that this pain has been present since her surgery. Pain is rated 5 out of 10 and is described as intermittent. Patient has had physical therapy and has used deep tissue massage with vitamin E oil, but her pain remains. UPDATE: Pt reports surgery was to be just left foot according to how she understands. She is going to call Dr. Gonsalo Escalera office to determine specifics prior to surgery date. Pt denies pain to left foot. Pt has migration of left great toe toward second toe. Pt reports she did complete physical therapy with bilateral feet but not currently. Denies pain with walking or standing. Denies hx of MRSA infection. Surgical incisions to bilateral feet well healed. PMHx includes:    Asthma: Associated medications include: none  Denies cough, SOB, wheezing, chest tightness. HTN:  Associated medications include: losartan, propanolol (for anxiety)  Denies recent or current chest pain/pressure, palpitations, SOB, headaches, dizziness, lower extremity edema.      BP Readings from Last 3 Encounters:   11/22/23 127/78   11/01/23 106/76   09/28/23 120/70     MTHFR, blood clots, several miscarriages:   Associated

## 2023-11-24 LAB
EKG ATRIAL RATE: 68 BPM
EKG P AXIS: 45 DEGREES
EKG P-R INTERVAL: 138 MS
EKG Q-T INTERVAL: 392 MS
EKG QRS DURATION: 88 MS
EKG QTC CALCULATION (BAZETT): 416 MS
EKG R AXIS: 42 DEGREES
EKG T AXIS: 15 DEGREES
EKG VENTRICULAR RATE: 68 BPM

## 2023-11-29 RX ORDER — ROPINIROLE 0.5 MG/1
0.5 TABLET, FILM COATED ORAL NIGHTLY
Qty: 30 TABLET | Refills: 1 | OUTPATIENT
Start: 2023-11-29

## 2023-12-05 ENCOUNTER — ANESTHESIA EVENT (OUTPATIENT)
Dept: OPERATING ROOM | Age: 43
End: 2023-12-05
Payer: COMMERCIAL

## 2023-12-05 ENCOUNTER — OFFICE VISIT (OUTPATIENT)
Dept: PRIMARY CARE CLINIC | Age: 43
End: 2023-12-05
Payer: COMMERCIAL

## 2023-12-05 VITALS
HEART RATE: 60 BPM | OXYGEN SATURATION: 99 % | RESPIRATION RATE: 14 BRPM | DIASTOLIC BLOOD PRESSURE: 88 MMHG | BODY MASS INDEX: 32.81 KG/M2 | WEIGHT: 222.2 LBS | SYSTOLIC BLOOD PRESSURE: 132 MMHG

## 2023-12-05 DIAGNOSIS — I10 PRIMARY HYPERTENSION: Primary | ICD-10-CM

## 2023-12-05 DIAGNOSIS — E66.09 CLASS 1 OBESITY DUE TO EXCESS CALORIES WITH SERIOUS COMORBIDITY AND BODY MASS INDEX (BMI) OF 33.0 TO 33.9 IN ADULT: ICD-10-CM

## 2023-12-05 PROCEDURE — 3078F DIAST BP <80 MM HG: CPT | Performed by: NURSE PRACTITIONER

## 2023-12-05 PROCEDURE — 99213 OFFICE O/P EST LOW 20 MIN: CPT | Performed by: NURSE PRACTITIONER

## 2023-12-05 PROCEDURE — 3074F SYST BP LT 130 MM HG: CPT | Performed by: NURSE PRACTITIONER

## 2023-12-05 ASSESSMENT — PATIENT HEALTH QUESTIONNAIRE - PHQ9
SUM OF ALL RESPONSES TO PHQ9 QUESTIONS 1 & 2: 0
SUM OF ALL RESPONSES TO PHQ QUESTIONS 1-9: 0
2. FEELING DOWN, DEPRESSED OR HOPELESS: 0
SUM OF ALL RESPONSES TO PHQ QUESTIONS 1-9: 0
1. LITTLE INTEREST OR PLEASURE IN DOING THINGS: 0
SUM OF ALL RESPONSES TO PHQ QUESTIONS 1-9: 0
SUM OF ALL RESPONSES TO PHQ QUESTIONS 1-9: 0

## 2023-12-05 NOTE — PRE-PROCEDURE INSTRUCTIONS
No answer, left message ? Yes                             Unable to leave message ? When were you told to arrive at hospital ?      Do you have a  ? Are you on any blood thinners ? If yes when did you stop taking ? Do you have your prep Rx filled and instruction ? Nothing to eat the day before , only clear liquids. Are you experiencing any covid symptoms ? Do you have any infections or rash we should be aware of ? Do you have the Hibiclens soap to use the night before and the morning of surgery ? Nothing to eat or drink after midnight, only a sip of water to take any medication instructed to take the night before.   Wear comfortable clothing, leave any valuables at home, remove any jewelry and body piercing   Left a voicemail reviewing arrival and surgery time, also to have a  and nothing to eat or drink after midnight

## 2023-12-05 NOTE — PROGRESS NOTES
Depression Screen  12/05/2024    Diabetes screen  05/26/2026    Lipids  05/26/2028    Hepatitis C screen  Completed    HIV screen  Completed    Hepatitis A vaccine  Aged Out    Hib vaccine  Aged Out    HPV vaccine  Aged Out    Meningococcal (ACWY) vaccine  Aged Out    Pneumococcal 0-64 years Vaccine  Aged Out    A1C test (Diabetic or Prediabetic)  Discontinued    Depression Monitoring  Discontinued       :     Review of Systems   Constitutional:  Negative for chills, fatigue and fever. HENT:  Positive for congestion, postnasal drip and sinus pressure. Negative for ear discharge, ear pain, sinus pain, sore throat and trouble swallowing. Eyes:  Negative for discharge, redness and itching. Respiratory:  Negative for cough, chest tightness, shortness of breath and wheezing. Cardiovascular:  Negative for chest pain. Gastrointestinal:  Negative for abdominal pain, diarrhea, nausea and vomiting. Genitourinary:  Negative for difficulty urinating. Musculoskeletal:  Negative for arthralgias and neck pain. Skin:  Negative for rash. Neurological:  Negative for dizziness, weakness, light-headedness and headaches. All other systems reviewed and are negative. Objective:     Physical Exam  Constitutional:       General: She is not in acute distress. Appearance: Normal appearance. She is normal weight. She is not ill-appearing. HENT:      Head: Normocephalic and atraumatic. Right Ear: Tympanic membrane normal.      Left Ear: Tympanic membrane normal.      Nose: Nose normal. No congestion or rhinorrhea. Right Sinus: No maxillary sinus tenderness or frontal sinus tenderness. Left Sinus: No maxillary sinus tenderness or frontal sinus tenderness. Mouth/Throat:      Mouth: Mucous membranes are moist.      Pharynx: Oropharynx is clear. No oropharyngeal exudate or posterior oropharyngeal erythema. Eyes:      Extraocular Movements: Extraocular movements intact.

## 2023-12-06 ENCOUNTER — ANESTHESIA (OUTPATIENT)
Dept: OPERATING ROOM | Age: 43
End: 2023-12-06
Payer: COMMERCIAL

## 2023-12-06 ENCOUNTER — HOSPITAL ENCOUNTER (OUTPATIENT)
Age: 43
Setting detail: OUTPATIENT SURGERY
Discharge: HOME | End: 2023-12-06
Attending: PODIATRIST | Admitting: PODIATRIST
Payer: COMMERCIAL

## 2023-12-06 VITALS
OXYGEN SATURATION: 98 % | HEART RATE: 77 BPM | BODY MASS INDEX: 32.88 KG/M2 | WEIGHT: 222 LBS | SYSTOLIC BLOOD PRESSURE: 124 MMHG | RESPIRATION RATE: 16 BRPM | HEIGHT: 69 IN | TEMPERATURE: 98.1 F | DIASTOLIC BLOOD PRESSURE: 84 MMHG

## 2023-12-06 DIAGNOSIS — G89.18 POST-OP PAIN: Primary | ICD-10-CM

## 2023-12-06 PROCEDURE — 3600000002 HC SURGERY LEVEL 2 BASE: Performed by: PODIATRIST

## 2023-12-06 PROCEDURE — 7100000030 HC ASPR PHASE II RECOVERY - FIRST 15 MIN: Performed by: PODIATRIST

## 2023-12-06 PROCEDURE — 2580000003 HC RX 258: Performed by: ANESTHESIOLOGY

## 2023-12-06 PROCEDURE — 7100000001 HC PACU RECOVERY - ADDTL 15 MIN: Performed by: PODIATRIST

## 2023-12-06 PROCEDURE — 2500000003 HC RX 250 WO HCPCS: Performed by: NURSE ANESTHETIST, CERTIFIED REGISTERED

## 2023-12-06 PROCEDURE — 6370000000 HC RX 637 (ALT 250 FOR IP): Performed by: ANESTHESIOLOGY

## 2023-12-06 PROCEDURE — 3700000001 HC ADD 15 MINUTES (ANESTHESIA): Performed by: PODIATRIST

## 2023-12-06 PROCEDURE — 2500000003 HC RX 250 WO HCPCS: Performed by: ANESTHESIOLOGY

## 2023-12-06 PROCEDURE — 2709999900 HC NON-CHARGEABLE SUPPLY: Performed by: PODIATRIST

## 2023-12-06 PROCEDURE — 6360000002 HC RX W HCPCS: Performed by: PODIATRIST

## 2023-12-06 PROCEDURE — 3600000012 HC SURGERY LEVEL 2 ADDTL 15MIN: Performed by: PODIATRIST

## 2023-12-06 PROCEDURE — 6360000002 HC RX W HCPCS: Performed by: NURSE ANESTHETIST, CERTIFIED REGISTERED

## 2023-12-06 PROCEDURE — 7100000011 HC PHASE II RECOVERY - ADDTL 15 MIN: Performed by: PODIATRIST

## 2023-12-06 PROCEDURE — 7100000031 HC ASPR PHASE II RECOVERY - ADDTL 15 MIN: Performed by: PODIATRIST

## 2023-12-06 PROCEDURE — 7100000010 HC PHASE II RECOVERY - FIRST 15 MIN: Performed by: PODIATRIST

## 2023-12-06 PROCEDURE — 3700000000 HC ANESTHESIA ATTENDED CARE: Performed by: PODIATRIST

## 2023-12-06 PROCEDURE — 7100000000 HC PACU RECOVERY - FIRST 15 MIN: Performed by: PODIATRIST

## 2023-12-06 RX ORDER — HYDRALAZINE HYDROCHLORIDE 20 MG/ML
10 INJECTION INTRAMUSCULAR; INTRAVENOUS
Status: CANCELLED | OUTPATIENT
Start: 2023-12-06

## 2023-12-06 RX ORDER — GABAPENTIN 300 MG/1
300 CAPSULE ORAL ONCE
Status: COMPLETED | OUTPATIENT
Start: 2023-12-06 | End: 2023-12-06

## 2023-12-06 RX ORDER — SODIUM CHLORIDE 0.9 % (FLUSH) 0.9 %
5-40 SYRINGE (ML) INJECTION PRN
Status: DISCONTINUED | OUTPATIENT
Start: 2023-12-06 | End: 2023-12-06 | Stop reason: HOSPADM

## 2023-12-06 RX ORDER — DIPHENHYDRAMINE HYDROCHLORIDE 50 MG/ML
12.5 INJECTION INTRAMUSCULAR; INTRAVENOUS
Status: CANCELLED | OUTPATIENT
Start: 2023-12-06 | End: 2023-12-07

## 2023-12-06 RX ORDER — ONDANSETRON 2 MG/ML
4 INJECTION INTRAMUSCULAR; INTRAVENOUS
Status: CANCELLED | OUTPATIENT
Start: 2023-12-06 | End: 2023-12-07

## 2023-12-06 RX ORDER — PROPOFOL 10 MG/ML
INJECTION, EMULSION INTRAVENOUS CONTINUOUS PRN
Status: DISCONTINUED | OUTPATIENT
Start: 2023-12-06 | End: 2023-12-06 | Stop reason: SDUPTHER

## 2023-12-06 RX ORDER — SODIUM CHLORIDE 0.9 % (FLUSH) 0.9 %
5-40 SYRINGE (ML) INJECTION EVERY 12 HOURS SCHEDULED
Status: CANCELLED | OUTPATIENT
Start: 2023-12-06

## 2023-12-06 RX ORDER — FENTANYL CITRATE 0.05 MG/ML
25 INJECTION, SOLUTION INTRAMUSCULAR; INTRAVENOUS EVERY 5 MIN PRN
Status: CANCELLED | OUTPATIENT
Start: 2023-12-06

## 2023-12-06 RX ORDER — LIDOCAINE HYDROCHLORIDE 10 MG/ML
1 INJECTION, SOLUTION EPIDURAL; INFILTRATION; INTRACAUDAL; PERINEURAL
Status: COMPLETED | OUTPATIENT
Start: 2023-12-06 | End: 2023-12-06

## 2023-12-06 RX ORDER — SODIUM CHLORIDE 9 MG/ML
INJECTION, SOLUTION INTRAVENOUS PRN
Status: DISCONTINUED | OUTPATIENT
Start: 2023-12-06 | End: 2023-12-06 | Stop reason: HOSPADM

## 2023-12-06 RX ORDER — ACETAMINOPHEN 500 MG
1000 TABLET ORAL ONCE
Status: COMPLETED | OUTPATIENT
Start: 2023-12-06 | End: 2023-12-06

## 2023-12-06 RX ORDER — SODIUM CHLORIDE 0.9 % (FLUSH) 0.9 %
5-40 SYRINGE (ML) INJECTION PRN
Status: CANCELLED | OUTPATIENT
Start: 2023-12-06

## 2023-12-06 RX ORDER — MIDAZOLAM HYDROCHLORIDE 1 MG/ML
INJECTION INTRAMUSCULAR; INTRAVENOUS PRN
Status: DISCONTINUED | OUTPATIENT
Start: 2023-12-06 | End: 2023-12-06 | Stop reason: SDUPTHER

## 2023-12-06 RX ORDER — METOCLOPRAMIDE HYDROCHLORIDE 5 MG/ML
10 INJECTION INTRAMUSCULAR; INTRAVENOUS
Status: CANCELLED | OUTPATIENT
Start: 2023-12-06 | End: 2023-12-07

## 2023-12-06 RX ORDER — LABETALOL HYDROCHLORIDE 5 MG/ML
10 INJECTION, SOLUTION INTRAVENOUS
Status: CANCELLED | OUTPATIENT
Start: 2023-12-06

## 2023-12-06 RX ORDER — SODIUM CHLORIDE 0.9 % (FLUSH) 0.9 %
5-40 SYRINGE (ML) INJECTION EVERY 12 HOURS SCHEDULED
Status: DISCONTINUED | OUTPATIENT
Start: 2023-12-06 | End: 2023-12-06 | Stop reason: HOSPADM

## 2023-12-06 RX ORDER — ONDANSETRON 2 MG/ML
INJECTION INTRAMUSCULAR; INTRAVENOUS PRN
Status: DISCONTINUED | OUTPATIENT
Start: 2023-12-06 | End: 2023-12-06 | Stop reason: SDUPTHER

## 2023-12-06 RX ORDER — ACETAMINOPHEN 325 MG/1
650 TABLET ORAL
Status: CANCELLED | OUTPATIENT
Start: 2023-12-06 | End: 2023-12-07

## 2023-12-06 RX ORDER — OXYCODONE HYDROCHLORIDE AND ACETAMINOPHEN 5; 325 MG/1; MG/1
1 TABLET ORAL EVERY 8 HOURS PRN
Qty: 16 TABLET | Refills: 0 | Status: SHIPPED | OUTPATIENT
Start: 2023-12-06 | End: 2023-12-11

## 2023-12-06 RX ORDER — SODIUM CHLORIDE 9 MG/ML
INJECTION, SOLUTION INTRAVENOUS PRN
Status: CANCELLED | OUTPATIENT
Start: 2023-12-06

## 2023-12-06 RX ORDER — LIDOCAINE HYDROCHLORIDE 10 MG/ML
INJECTION, SOLUTION EPIDURAL; INFILTRATION; INTRACAUDAL; PERINEURAL PRN
Status: DISCONTINUED | OUTPATIENT
Start: 2023-12-06 | End: 2023-12-06 | Stop reason: SDUPTHER

## 2023-12-06 RX ORDER — PROPOFOL 10 MG/ML
INJECTION, EMULSION INTRAVENOUS PRN
Status: DISCONTINUED | OUTPATIENT
Start: 2023-12-06 | End: 2023-12-06 | Stop reason: SDUPTHER

## 2023-12-06 RX ORDER — BUPIVACAINE HYDROCHLORIDE 5 MG/ML
INJECTION, SOLUTION EPIDURAL; INTRACAUDAL PRN
Status: DISCONTINUED | OUTPATIENT
Start: 2023-12-06 | End: 2023-12-06 | Stop reason: ALTCHOICE

## 2023-12-06 RX ORDER — DOXYCYCLINE HYCLATE 100 MG
100 TABLET ORAL DAILY
Qty: 10 TABLET | Refills: 0 | Status: SHIPPED | OUTPATIENT
Start: 2023-12-06 | End: 2023-12-16

## 2023-12-06 RX ORDER — KETOROLAC TROMETHAMINE 30 MG/ML
INJECTION, SOLUTION INTRAMUSCULAR; INTRAVENOUS PRN
Status: DISCONTINUED | OUTPATIENT
Start: 2023-12-06 | End: 2023-12-06 | Stop reason: SDUPTHER

## 2023-12-06 RX ORDER — SODIUM CHLORIDE, SODIUM LACTATE, POTASSIUM CHLORIDE, CALCIUM CHLORIDE 600; 310; 30; 20 MG/100ML; MG/100ML; MG/100ML; MG/100ML
INJECTION, SOLUTION INTRAVENOUS CONTINUOUS
Status: DISCONTINUED | OUTPATIENT
Start: 2023-12-06 | End: 2023-12-06 | Stop reason: HOSPADM

## 2023-12-06 RX ORDER — MEPERIDINE HYDROCHLORIDE 25 MG/ML
12.5 INJECTION INTRAMUSCULAR; INTRAVENOUS; SUBCUTANEOUS EVERY 5 MIN PRN
Status: CANCELLED | OUTPATIENT
Start: 2023-12-06

## 2023-12-06 RX ADMIN — Medication 2000 MG: at 07:41

## 2023-12-06 RX ADMIN — ACETAMINOPHEN 1000 MG: 500 TABLET ORAL at 06:31

## 2023-12-06 RX ADMIN — PROPOFOL 150 MG: 10 INJECTION, EMULSION INTRAVENOUS at 07:32

## 2023-12-06 RX ADMIN — KETOROLAC TROMETHAMINE 30 MG: 30 INJECTION, SOLUTION INTRAMUSCULAR; INTRAVENOUS at 08:03

## 2023-12-06 RX ADMIN — ONDANSETRON 4 MG: 2 INJECTION INTRAMUSCULAR; INTRAVENOUS at 08:03

## 2023-12-06 RX ADMIN — SODIUM CHLORIDE, POTASSIUM CHLORIDE, SODIUM LACTATE AND CALCIUM CHLORIDE: 600; 310; 30; 20 INJECTION, SOLUTION INTRAVENOUS at 06:30

## 2023-12-06 RX ADMIN — LIDOCAINE HYDROCHLORIDE 1 ML: 10 INJECTION, SOLUTION EPIDURAL; INFILTRATION; INTRACAUDAL; PERINEURAL at 06:35

## 2023-12-06 RX ADMIN — MIDAZOLAM 2 MG: 1 INJECTION INTRAMUSCULAR; INTRAVENOUS at 07:27

## 2023-12-06 RX ADMIN — GABAPENTIN 300 MG: 300 CAPSULE ORAL at 06:31

## 2023-12-06 RX ADMIN — PROPOFOL 150 MCG/KG/MIN: 10 INJECTION, EMULSION INTRAVENOUS at 07:32

## 2023-12-06 RX ADMIN — LIDOCAINE HYDROCHLORIDE 50 MG: 10 INJECTION, SOLUTION EPIDURAL; INFILTRATION; INTRACAUDAL; PERINEURAL at 07:32

## 2023-12-06 ASSESSMENT — PAIN - FUNCTIONAL ASSESSMENT: PAIN_FUNCTIONAL_ASSESSMENT: NONE - DENIES PAIN

## 2023-12-06 ASSESSMENT — PAIN SCALES - GENERAL: PAINLEVEL_OUTOF10: 0

## 2023-12-06 NOTE — OP NOTE
Department of Foot and Ankle Surgery    OPERATIVE NOTE    PATIENT NAME: Aileen Khan  YOB: 1980  -  41 y.o. female  MRN: [id number]  DATE: 12/6/2023  BILLING #: [id number]    Surgeon(s):  Jagruti Ac DPM     ASSISTANTS: Luana Dasilva DPM     PRE-OP DIAGNOSIS:   Hallux valgus  Extensor contracture of the MPJ, left foot  Contracture of EHL, left foot  Contracture of EDL, left foot    POST-OP DIAGNOSIS: Same as above. PROCEDURE:   Extensor tendons lengthening, through 1 incision, left foot ([id number])    ANESTHESIA: MAC    HEMOSTASIS: Pneumatic tourniquet to left LE @ 225 mmHg for 38 minutes. ESTIMATED BLOOD LOSS: Less than 30cc. MATERIALS:   * No implants in log *    INJECTABLES:   10ml of 0.5% marcaine plain preoperatively, 10cc post op    SPECIMEN:   * No specimens in log *    COMPLICATIONS:     FINDINGS: contracture of extensor tendon . The patient was counseled at length about the risks of neal Covid-19 during their perioperative period and any recovery window from their procedure. The patient was made aware that neal Covid-19  may worsen their prognosis for recovering from their procedure  and lend to a higher morbidity and/or mortality risk. All material risks, benefits, and reasonable alternatives including postponing the procedure were discussed. The patient does wish to proceed with the procedure at this time. INDICATIONS FOR PROCEDURE: Patient has had dorsally and laterally contracted first digit or quite some time. Patient had previous Lapidus bunionectomy with medial 1st eminence resection. Since then patient has had pain to left foot with recurrence of contracture that is laterally and dorsally contracted, however reducible. Upon biomechanical evaluation she has rigid 1st ray, the MPJ is reducible, pes planus foot type with no equines, antalgic gait. Patient has failed multiple conservative treatments and elected to undergo surgery.  Risks and

## 2023-12-06 NOTE — INTERVAL H&P NOTE
Update History & Physical    The patient's History and Physical of November 22, 2023 was reviewed with the patient and I examined the patient. Any changes are as documented below. Here today for CAPSOLOTOMY BILATERAL 1ST METATARSOPHALANGEAL JOINT (BIG TOE) bilateral per Dr. Ela Mckenna     Pt AAO x 3 in NAD. HRRR. No adventitious lung sounds. No respiratory distress. Denies new open wounds or rash. NPO p MN. Took no medications this am. Stopped ibuprofen over one week ago. Denies taking any blood thinning medications. Denies recent or current chest pain/pressure, palpitations, SOB, recent URI, fever or chills. Review vitals per RN flowsheet.      Electronically signed by CHERELLE Moore CNP on 12/6/2023 at 5:51 AM

## 2023-12-06 NOTE — ANESTHESIA PRE PROCEDURE
Department of Anesthesiology  Preprocedure Note       Name:  Jason Rubi   Age:  41 y.o.  :  1980                                          MRN:  [id number]         Date:  2023      Surgeon: Cristela Gallardo):  Ponce Lees DPM    Procedure: Procedure(s):  CAPSOLOTOMY  1ST METATARSOPHALANGEAL JOINT (BIG TOE)    Medications prior to admission:   Prior to Admission medications    Medication Sig Start Date End Date Taking? Authorizing Provider   rOPINIRole (REQUIP) 1 MG tablet Take 1 tablet by mouth at bedtime 23   Breanne Wahl APRN - CNP   traZODone (DESYREL) 50 MG tablet Take 1 tablet by mouth nightly as needed for Sleep 10/16/23   Breanne Wahl APRN - CNP   losartan (COZAAR) 25 MG tablet Take 1 tablet by mouth daily 23   Breanne Wahl, APRN - CNP   citalopram (CELEXA) 20 MG tablet Take 1 tablet by mouth daily 23   Breanne Wahl APRN - CNP   gabapentin (NEURONTIN) 100 MG capsule Take 1 capsule by mouth daily for 180 days. Intended supply: 30 days 9/28/23 3/26/24  Breanne Wahl APRN - CNP   hydrOXYzine HCl (ATARAX) 25 MG tablet TAKE  2 TABLETS BY MOUTH AT BEDTIME AS NEEDED 23   Breanne Wahl APRN - CNP   ibuprofen (ADVIL;MOTRIN) 800 MG tablet Take 1 tablet by mouth every 8 hours as needed for Pain 23   Breanne Wahl APRN - CNP   buPROPion (WELLBUTRIN XL) 300 MG extended release tablet Take 1 tablet by mouth every morning 23   Breanne Wahl, APRN - CNP   propranolol (INDERAL) 20 MG tablet Take 1 tablet by mouth 3 times daily 23   Breanne Wahl, APRN - CNP   gabapentin (NEURONTIN) 300 MG capsule Take 1 capsule by mouth nightly for 180 days.  23  Luis Enriquee Vish, APRN - CNP   valACYclovir (VALTREX) 500 MG tablet Take 1 tablet by mouth daily 23   Breanne Wahl APRN - CNP   valACYclovir (VALTREX) 1 g tablet Take 1 tablet by mouth 2 times daily Start within 72 hours of sx onset 23   Breanne Wahl, CHERELLE - CNP

## 2023-12-06 NOTE — DISCHARGE INSTRUCTIONS
Foot and Ankle Surgery Post Operative Instructions: You have had a surgical procedure on your left foot. Fluids and Diet:  Begin with clear liquids, broth, dry toast, and crackers. If not nauseated then resume your regular pre-operative diet when you are ready  Medications: Take your prescriptions as directed  You are receiving new prescriptions for Norco and Doxy   You received nerve block to the foot-this should manage your pain for the first 18hrs post operatively  If your pain is not severe then you may take the non-prescription medication that you normally take for aches and pains ie Tylenol and Ibuprofen (alternating), or if severe pain occurs these will serve as additional medication in conjuction with the norco  You may resume your regularly scheduled medications (unless otherwise directed)  If any side effects or adverse reactions occur, discontinue the medication and contact your doctor. Review the patient drug information that is provided before you take any medication    Ambulation and Activity:  You are advised to go directly home from the hospital  Use crutches, splint/brace, or walker as needed  We recommend knee scooter if possible, you can also obtain these on [address] [place] [address] for rather affordable and quickly obtainable. You may not put weight on the operated foot. Wear the surgical shoe . Do not walk on the left foot. Avoid stairs.   Do not lift or move heavy objects  Do not drive until cleared by your physician    Bandage and Wound Care Instructions:  Keep bandage clean and dry  Do NOT remove dressing/ splint  DO NOT get wet  Please use shower cover around leg if you do shower so that dressing does not get wet  Do not shower or bathe the operative extremity  Do not remove the bandage (unless otherwise directed)   Do not attempt to put anything between the cast or dressing and your skin, some itching is normal.    Ice and Elevation:   Elevate operative extremity as much as possible to Problem: Respiratory - Adult  Goal: Achieves optimal ventilation and oxygenation  Outcome: Progressing  Flowsheets (Taken 5/11/2022 0737)  Achieves optimal ventilation and oxygenation:   Assess for changes in respiratory status   Assess the need for suctioning and aspirate as needed   Respiratory therapy support as indicated   Oxygen supplementation based on oxygen saturation or arterial blood gases  Note: Patient is synchronous with ventilator.

## 2023-12-07 ASSESSMENT — ENCOUNTER SYMPTOMS
SINUS PAIN: 0
CHEST TIGHTNESS: 0
SORE THROAT: 0
SHORTNESS OF BREATH: 0
EYE DISCHARGE: 0
ABDOMINAL PAIN: 0
EYE REDNESS: 0
WHEEZING: 0
COUGH: 0
NAUSEA: 0
SINUS PRESSURE: 1
TROUBLE SWALLOWING: 0
VOMITING: 0
EYE ITCHING: 0
DIARRHEA: 0

## 2023-12-11 ENCOUNTER — OFFICE VISIT (OUTPATIENT)
Dept: PODIATRY | Age: 43
End: 2023-12-11

## 2023-12-11 VITALS — WEIGHT: 222 LBS | BODY MASS INDEX: 32.88 KG/M2 | HEIGHT: 69 IN

## 2023-12-11 DIAGNOSIS — M20.12 HALLUX VALGUS, LEFT: Primary | ICD-10-CM

## 2023-12-11 DIAGNOSIS — R60.0 EDEMA, LOWER EXTREMITY: ICD-10-CM

## 2023-12-11 DIAGNOSIS — M79.672 PAIN IN LEFT FOOT: ICD-10-CM

## 2023-12-11 DIAGNOSIS — Z98.890 POST-OPERATIVE STATE: ICD-10-CM

## 2023-12-11 PROBLEM — M24.575: Status: ACTIVE | Noted: 2023-12-11

## 2023-12-11 PROCEDURE — 99024 POSTOP FOLLOW-UP VISIT: CPT | Performed by: PODIATRIST

## 2023-12-11 ASSESSMENT — ENCOUNTER SYMPTOMS
NAUSEA: 0
DIARRHEA: 0
BACK PAIN: 0
SHORTNESS OF BREATH: 0
COLOR CHANGE: 0

## 2023-12-11 NOTE — PROGRESS NOTES
Subjective: Patient presents to the office today status-post capsulorrhaphy and extensor hallucis longus tendon lengthening left foot. Patient states they have kept the dressing to the left lower extremity clean, dry, and intact since the surgery. Patient states that they have worn their post op shoe on the left foot as instructed since the surgery. Patient states that their pain has been well-controlled with the prescribed pain medication. Patient states that they have been taking their oral antibiotics as prescribed. Patient denies any fever, chills, nausea, vomiting, or night sweats. Review of Systems   Constitutional:  Negative for activity change, appetite change, chills, diaphoresis, fatigue and fever. Respiratory:  Negative for shortness of breath. Cardiovascular:  Negative for leg swelling. Gastrointestinal:  Negative for diarrhea and nausea. Endocrine: Negative for cold intolerance, heat intolerance and polyuria. Musculoskeletal:  Positive for gait problem and joint swelling. Negative for arthralgias, back pain and myalgias. Skin:  Positive for wound. Negative for color change, pallor and rash. Allergic/Immunologic: Negative for environmental allergies and food allergies. Neurological:  Negative for dizziness, weakness, light-headedness and numbness. Hematological:  Does not bruise/bleed easily. Psychiatric/Behavioral:  Negative for behavioral problems, confusion and self-injury. The patient is not nervous/anxious. Objective: Clinical evaluation of the patient reveals dressing to the left lower extremity to be clean, dry, and intact. Removal of the dressing reveals incision to be well-coapted and the sutures intact. There is erythema surrounding the incision site, but it does not extend beyond this area. There is no calor, drainage, or malodor noted to the surgical site. There is moderate non-pitting edema present to the left lower extremity.   Clinically there remains adequate

## 2024-01-09 ENCOUNTER — OFFICE VISIT (OUTPATIENT)
Dept: PODIATRY | Age: 44
End: 2024-01-09

## 2024-01-09 VITALS — WEIGHT: 220 LBS | HEIGHT: 69 IN | BODY MASS INDEX: 32.58 KG/M2

## 2024-01-09 DIAGNOSIS — M79.672 PAIN IN LEFT FOOT: ICD-10-CM

## 2024-01-09 DIAGNOSIS — Z98.890 POST-OPERATIVE STATE: ICD-10-CM

## 2024-01-09 DIAGNOSIS — M20.12 HALLUX VALGUS, LEFT: Primary | ICD-10-CM

## 2024-01-09 DIAGNOSIS — R60.0 EDEMA, LOWER EXTREMITY: ICD-10-CM

## 2024-01-09 PROCEDURE — 99024 POSTOP FOLLOW-UP VISIT: CPT | Performed by: PODIATRIST

## 2024-01-09 NOTE — PROGRESS NOTES
[facility]ry  Return Patient Progress Note    Subjective: Jennifer Rojas 43 y.o. female that presents for follow up evaluation of capsulorrhaphy and extensor hallucis longus tendon lengthening left foot.  Chief Complaint   Patient presents with    Post-Op Check     Left foot     Patient's treatment thus far has included regular shoewear and activity.  Pain is rated 3 out of 10 and is described as intermittent. Patient has been following my prescribed course of therapy as instructed.  Patient states that range of motion to the left great toe is limited.    Review of Systems   Constitutional:  Negative for activity change, appetite change, chills, diaphoresis, fatigue and fever.   Respiratory:  Negative for shortness of breath.    Cardiovascular:  Negative for leg swelling.   Gastrointestinal:  Negative for diarrhea and nausea.   Endocrine: Negative for cold intolerance, heat intolerance and polyuria.   Musculoskeletal:  Positive for joint swelling. Negative for arthralgias, back pain, gait problem and myalgias.   Skin:  Negative for color change, pallor, rash and wound.   Allergic/Immunologic: Negative for environmental allergies and food allergies.   Neurological:  Negative for dizziness, weakness, light-headedness and numbness.   Hematological:  Does not bruise/bleed easily.   Psychiatric/Behavioral:  Negative for behavioral problems, confusion and self-injury. The patient is not nervous/anxious.        Objective: Clinical evaluation of the patient reveals mild abduction of the left hallux at the level of the MTPJ.  There is edema remaining to the left forefoot.  Surgical incision is adequately healed.  There is no erythema or calor noted to the left foot.  Passive range of motion to the left first MTPJ is adequate.  However, active range of motion of the left first MTPJ does present with limitation.  There is only minimal pain with palpation and manipulation of the left first ray.    Assessment:

## 2024-01-11 ASSESSMENT — ENCOUNTER SYMPTOMS
BACK PAIN: 0
DIARRHEA: 0
SHORTNESS OF BREATH: 0
NAUSEA: 0
COLOR CHANGE: 0

## 2024-01-26 RX ORDER — GABAPENTIN 100 MG/1
100 CAPSULE ORAL DAILY
Qty: 90 CAPSULE | Refills: 1 | Status: SHIPPED | OUTPATIENT
Start: 2024-01-26 | End: 2024-07-24

## 2024-02-05 ENCOUNTER — OFFICE VISIT (OUTPATIENT)
Dept: PRIMARY CARE CLINIC | Age: 44
End: 2024-02-05
Payer: COMMERCIAL

## 2024-02-05 VITALS
BODY MASS INDEX: 34.02 KG/M2 | HEART RATE: 73 BPM | OXYGEN SATURATION: 98 % | SYSTOLIC BLOOD PRESSURE: 126 MMHG | WEIGHT: 230.4 LBS | RESPIRATION RATE: 16 BRPM | DIASTOLIC BLOOD PRESSURE: 74 MMHG

## 2024-02-05 DIAGNOSIS — F41.9 ANXIETY: ICD-10-CM

## 2024-02-05 DIAGNOSIS — J06.9 UPPER RESPIRATORY TRACT INFECTION, UNSPECIFIED TYPE: Primary | ICD-10-CM

## 2024-02-05 DIAGNOSIS — I10 PRIMARY HYPERTENSION: ICD-10-CM

## 2024-02-05 DIAGNOSIS — R63.2 BINGE EATING: ICD-10-CM

## 2024-02-05 DIAGNOSIS — G47.00 INSOMNIA, UNSPECIFIED TYPE: ICD-10-CM

## 2024-02-05 DIAGNOSIS — G25.81 RLS (RESTLESS LEGS SYNDROME): ICD-10-CM

## 2024-02-05 DIAGNOSIS — E66.09 CLASS 1 OBESITY DUE TO EXCESS CALORIES WITH SERIOUS COMORBIDITY AND BODY MASS INDEX (BMI) OF 33.0 TO 33.9 IN ADULT: ICD-10-CM

## 2024-02-05 PROCEDURE — 3074F SYST BP LT 130 MM HG: CPT | Performed by: NURSE PRACTITIONER

## 2024-02-05 PROCEDURE — 99214 OFFICE O/P EST MOD 30 MIN: CPT | Performed by: NURSE PRACTITIONER

## 2024-02-05 PROCEDURE — 3078F DIAST BP <80 MM HG: CPT | Performed by: NURSE PRACTITIONER

## 2024-02-05 RX ORDER — AZITHROMYCIN 250 MG/1
TABLET, FILM COATED ORAL
Qty: 1 PACKET | Refills: 0 | Status: SHIPPED | OUTPATIENT
Start: 2024-02-05 | End: 2024-02-15

## 2024-02-05 RX ORDER — CODEINE PHOSPHATE/GUAIFENESIN 10-100MG/5
10 LIQUID (ML) ORAL 2 TIMES DAILY PRN
Qty: 118 ML | Refills: 0 | Status: SHIPPED | OUTPATIENT
Start: 2024-02-05 | End: 2024-02-12

## 2024-02-05 RX ORDER — TRAZODONE HYDROCHLORIDE 100 MG/1
100 TABLET ORAL NIGHTLY
Qty: 90 TABLET | Refills: 1 | Status: SHIPPED | OUTPATIENT
Start: 2024-02-05

## 2024-02-05 RX ORDER — LISDEXAMFETAMINE DIMESYLATE CAPSULES 30 MG/1
30 CAPSULE ORAL DAILY
Qty: 30 CAPSULE | Refills: 0 | Status: SHIPPED | OUTPATIENT
Start: 2024-02-05 | End: 2024-03-06

## 2024-02-05 ASSESSMENT — PATIENT HEALTH QUESTIONNAIRE - PHQ9
SUM OF ALL RESPONSES TO PHQ QUESTIONS 1-9: 0
1. LITTLE INTEREST OR PLEASURE IN DOING THINGS: 0
2. FEELING DOWN, DEPRESSED OR HOPELESS: 0
SUM OF ALL RESPONSES TO PHQ QUESTIONS 1-9: 0
SUM OF ALL RESPONSES TO PHQ9 QUESTIONS 1 & 2: 0

## 2024-02-05 ASSESSMENT — ENCOUNTER SYMPTOMS
WHEEZING: 0
SHORTNESS OF BREATH: 0
EYE ITCHING: 0
ABDOMINAL PAIN: 0
SINUS PAIN: 0
EYE REDNESS: 0
NAUSEA: 0
SORE THROAT: 0
DIARRHEA: 0
EYE DISCHARGE: 0
SINUS PRESSURE: 0
COUGH: 1
CHEST TIGHTNESS: 0
VOMITING: 0
TROUBLE SWALLOWING: 0

## 2024-02-05 NOTE — PROGRESS NOTES
Never done    Cervical cancer screen  03/28/2022    Flu vaccine (1) Never done    Depression Screen  12/05/2024    Diabetes screen  05/26/2026    Lipids  05/26/2028    Hepatitis C screen  Completed    HIV screen  Completed    Hepatitis A vaccine  Aged Out    Hib vaccine  Aged Out    HPV vaccine  Aged Out    Polio vaccine  Aged Out    Meningococcal (ACWY) vaccine  Aged Out    Pneumococcal 0-64 years Vaccine  Aged Out    A1C test (Diabetic or Prediabetic)  Discontinued    Depression Monitoring  Discontinued       :     Review of Systems   Constitutional:  Positive for chills and fatigue. Negative for fever.   HENT:  Positive for congestion. Negative for ear discharge, ear pain, sinus pressure, sinus pain, sore throat and trouble swallowing.    Eyes:  Negative for discharge, redness and itching.   Respiratory:  Positive for cough. Negative for chest tightness, shortness of breath and wheezing.    Cardiovascular:  Negative for chest pain.   Gastrointestinal:  Negative for abdominal pain, diarrhea, nausea and vomiting.   Genitourinary:  Negative for difficulty urinating.   Musculoskeletal:  Negative for arthralgias and neck pain.   Skin:  Negative for rash.   Neurological:  Negative for dizziness, weakness, light-headedness and headaches.   All other systems reviewed and are negative.      Objective:     Physical Exam  Constitutional:       General: She is not in acute distress.     Appearance: Normal appearance. She is normal weight. She is not ill-appearing.   HENT:      Head: Normocephalic and atraumatic.      Right Ear: Tympanic membrane normal.      Left Ear: Tympanic membrane normal.      Nose: Nose normal. No congestion or rhinorrhea.      Right Sinus: No maxillary sinus tenderness or frontal sinus tenderness.      Left Sinus: No maxillary sinus tenderness or frontal sinus tenderness.      Mouth/Throat:      Mouth: Mucous membranes are moist.      Pharynx: Oropharynx is clear. No oropharyngeal exudate or posterior

## 2024-02-20 ENCOUNTER — OFFICE VISIT (OUTPATIENT)
Dept: PODIATRY | Age: 44
End: 2024-02-20

## 2024-02-20 VITALS — HEIGHT: 69 IN | BODY MASS INDEX: 34.07 KG/M2 | WEIGHT: 230 LBS

## 2024-02-20 DIAGNOSIS — R52 PAIN IN SURGICAL SCAR: ICD-10-CM

## 2024-02-20 DIAGNOSIS — L90.5 PAIN IN SURGICAL SCAR: ICD-10-CM

## 2024-02-20 DIAGNOSIS — M79.672 PAIN IN LEFT FOOT: ICD-10-CM

## 2024-02-20 DIAGNOSIS — M20.12 HALLUX VALGUS, LEFT: Primary | ICD-10-CM

## 2024-02-20 DIAGNOSIS — M79.671 PAIN IN RIGHT FOOT: ICD-10-CM

## 2024-02-20 PROCEDURE — 99024 POSTOP FOLLOW-UP VISIT: CPT | Performed by: PODIATRIST

## 2024-02-20 ASSESSMENT — ENCOUNTER SYMPTOMS
DIARRHEA: 0
BACK PAIN: 0
SHORTNESS OF BREATH: 0
NAUSEA: 0
COLOR CHANGE: 0

## 2024-02-20 NOTE — PROGRESS NOTES
[facility]  Return Patient Progress Note    Subjective: Jennifer Rojas 43 y.o. female that presents for follow up evaluation of capsulorrhaphy and extensor hallucis longus tendon lengthening left foot.  Chief Complaint   Patient presents with    Post-Op Check     Left foot    Patient's treatment thus far has included physical therapy and regular shoewear.  Pain is rated 0 out of 10 and is described as none. Patient has been following my prescribed course of therapy as instructed.  Patient relates some pain to the arch of the right foot today.    Review of Systems   Constitutional:  Negative for activity change, appetite change, chills, diaphoresis, fatigue and fever.   Respiratory:  Negative for shortness of breath.    Cardiovascular:  Negative for leg swelling.   Gastrointestinal:  Negative for diarrhea and nausea.   Endocrine: Negative for cold intolerance, heat intolerance and polyuria.   Musculoskeletal:  Negative for arthralgias, back pain, gait problem, joint swelling and myalgias.   Skin:  Negative for color change, pallor, rash and wound.   Allergic/Immunologic: Negative for environmental allergies and food allergies.   Neurological:  Negative for dizziness, weakness, light-headedness and numbness.   Hematological:  Does not bruise/bleed easily.   Psychiatric/Behavioral:  Negative for behavioral problems, confusion and self-injury. The patient is not nervous/anxious.        Objective: Clinical evaluation of the patient reveals continued mild abduction of the left hallux at the level of the MTPJ. There is no edema remaining to the left forefoot. Surgical incision remains adequately healed. There is no erythema or calor noted to the left foot. Passive and active range of motion to the left first MTPJ is adequate. There is no pain with palpation and manipulation of the left first ray today.  There is mild pain with palpation to the right foot at the plantar base of the first metatarsal.  This

## 2024-03-05 ENCOUNTER — TELEPHONE (OUTPATIENT)
Dept: PRIMARY CARE CLINIC | Age: 44
End: 2024-03-05

## 2024-03-05 ENCOUNTER — OFFICE VISIT (OUTPATIENT)
Dept: PRIMARY CARE CLINIC | Age: 44
End: 2024-03-05
Payer: COMMERCIAL

## 2024-03-05 VITALS
WEIGHT: 232.8 LBS | OXYGEN SATURATION: 98 % | HEART RATE: 70 BPM | RESPIRATION RATE: 16 BRPM | SYSTOLIC BLOOD PRESSURE: 126 MMHG | BODY MASS INDEX: 34.38 KG/M2 | DIASTOLIC BLOOD PRESSURE: 70 MMHG

## 2024-03-05 DIAGNOSIS — F41.9 ANXIETY: ICD-10-CM

## 2024-03-05 DIAGNOSIS — G25.81 RLS (RESTLESS LEGS SYNDROME): ICD-10-CM

## 2024-03-05 DIAGNOSIS — R63.2 BINGE EATING: ICD-10-CM

## 2024-03-05 DIAGNOSIS — I10 PRIMARY HYPERTENSION: ICD-10-CM

## 2024-03-05 DIAGNOSIS — G47.00 INSOMNIA, UNSPECIFIED TYPE: Primary | ICD-10-CM

## 2024-03-05 PROCEDURE — 99214 OFFICE O/P EST MOD 30 MIN: CPT | Performed by: NURSE PRACTITIONER

## 2024-03-05 PROCEDURE — 3074F SYST BP LT 130 MM HG: CPT | Performed by: NURSE PRACTITIONER

## 2024-03-05 PROCEDURE — 3078F DIAST BP <80 MM HG: CPT | Performed by: NURSE PRACTITIONER

## 2024-03-05 RX ORDER — TEMAZEPAM 15 MG/1
15 CAPSULE ORAL NIGHTLY PRN
Qty: 14 CAPSULE | Refills: 0 | Status: SHIPPED | OUTPATIENT
Start: 2024-03-05 | End: 2024-03-19

## 2024-03-05 RX ORDER — LISDEXAMFETAMINE DIMESYLATE CAPSULES 30 MG/1
30 CAPSULE ORAL DAILY
Qty: 30 CAPSULE | Refills: 0 | Status: SHIPPED | OUTPATIENT
Start: 2024-03-05 | End: 2024-04-04

## 2024-03-05 SDOH — ECONOMIC STABILITY: FOOD INSECURITY: WITHIN THE PAST 12 MONTHS, YOU WORRIED THAT YOUR FOOD WOULD RUN OUT BEFORE YOU GOT MONEY TO BUY MORE.: NEVER TRUE

## 2024-03-05 SDOH — ECONOMIC STABILITY: INCOME INSECURITY: HOW HARD IS IT FOR YOU TO PAY FOR THE VERY BASICS LIKE FOOD, HOUSING, MEDICAL CARE, AND HEATING?: NOT HARD AT ALL

## 2024-03-05 ASSESSMENT — ENCOUNTER SYMPTOMS
EYE DISCHARGE: 0
COUGH: 0
ABDOMINAL PAIN: 0
TROUBLE SWALLOWING: 0
SORE THROAT: 0
NAUSEA: 0
SINUS PAIN: 0
VOMITING: 0
EYE REDNESS: 0
CHEST TIGHTNESS: 0
DIARRHEA: 0
EYE ITCHING: 0
WHEEZING: 0
SHORTNESS OF BREATH: 0
SINUS PRESSURE: 0

## 2024-03-05 ASSESSMENT — PATIENT HEALTH QUESTIONNAIRE - PHQ9
2. FEELING DOWN, DEPRESSED OR HOPELESS: 0
SUM OF ALL RESPONSES TO PHQ QUESTIONS 1-9: 0
1. LITTLE INTEREST OR PLEASURE IN DOING THINGS: 0
SUM OF ALL RESPONSES TO PHQ QUESTIONS 1-9: 0
SUM OF ALL RESPONSES TO PHQ9 QUESTIONS 1 & 2: 0
SUM OF ALL RESPONSES TO PHQ QUESTIONS 1-9: 0
SUM OF ALL RESPONSES TO PHQ QUESTIONS 1-9: 0

## 2024-03-05 NOTE — TELEPHONE ENCOUNTER
Called [employer] to madhu ROTH for Vyvanse, informed by representative that form will be faxed to office to be compelted

## 2024-03-05 NOTE — PROGRESS NOTES
Hepatitis A vaccine  Aged Out    Hib vaccine  Aged Out    HPV vaccine  Aged Out    Polio vaccine  Aged Out    Meningococcal (ACWY) vaccine  Aged Out    Pneumococcal 0-64 years Vaccine  Aged Out    A1C test (Diabetic or Prediabetic)  Discontinued    Depression Monitoring  Discontinued       :     Review of Systems   Constitutional:  Negative for chills, fatigue and fever.   HENT:  Negative for ear discharge, ear pain, sinus pressure, sinus pain, sore throat and trouble swallowing.    Eyes:  Negative for discharge, redness and itching.   Respiratory:  Negative for cough, chest tightness, shortness of breath and wheezing.    Cardiovascular:  Negative for chest pain.   Gastrointestinal:  Negative for abdominal pain, diarrhea, nausea and vomiting.   Genitourinary:  Negative for difficulty urinating.   Musculoskeletal:  Negative for arthralgias and neck pain.   Skin:  Negative for rash.   Neurological:  Negative for dizziness, weakness, light-headedness and headaches.   Psychiatric/Behavioral:          Insomnia   All other systems reviewed and are negative.      Objective:     Physical Exam  Constitutional:       General: She is not in acute distress.     Appearance: Normal appearance. She is normal weight. She is not ill-appearing.   HENT:      Head: Normocephalic and atraumatic.      Nose: Nose normal. No congestion or rhinorrhea.      Mouth/Throat:      Mouth: Mucous membranes are moist.      Pharynx: Oropharynx is clear. No oropharyngeal exudate or posterior oropharyngeal erythema.   Eyes:      Extraocular Movements: Extraocular movements intact.      Conjunctiva/sclera: Conjunctivae normal.      Pupils: Pupils are equal, round, and reactive to light.   Cardiovascular:      Rate and Rhythm: Normal rate and regular rhythm.      Pulses: Normal pulses.      Heart sounds: Normal heart sounds. No murmur heard.  Pulmonary:      Effort: Pulmonary effort is normal. No respiratory distress.      Breath sounds: Normal breath

## 2024-03-07 NOTE — TELEPHONE ENCOUNTER
Vyvanse PA approved    Called patient and lvm notifying of approval and to contact office w/ any questions or concerns

## 2024-04-18 ENCOUNTER — OFFICE VISIT (OUTPATIENT)
Dept: PRIMARY CARE CLINIC | Age: 44
End: 2024-04-18
Payer: COMMERCIAL

## 2024-04-18 VITALS
OXYGEN SATURATION: 98 % | DIASTOLIC BLOOD PRESSURE: 72 MMHG | BODY MASS INDEX: 35.35 KG/M2 | HEART RATE: 73 BPM | WEIGHT: 239.4 LBS | RESPIRATION RATE: 14 BRPM | SYSTOLIC BLOOD PRESSURE: 108 MMHG

## 2024-04-18 DIAGNOSIS — E66.01 SEVERE OBESITY (BMI 35.0-39.9) WITH COMORBIDITY (HCC): ICD-10-CM

## 2024-04-18 DIAGNOSIS — F41.9 ANXIETY: ICD-10-CM

## 2024-04-18 DIAGNOSIS — R63.2 BINGE EATING: ICD-10-CM

## 2024-04-18 DIAGNOSIS — G47.00 INSOMNIA, UNSPECIFIED TYPE: Primary | ICD-10-CM

## 2024-04-18 DIAGNOSIS — F51.5 NIGHTMARES: ICD-10-CM

## 2024-04-18 DIAGNOSIS — I10 PRIMARY HYPERTENSION: ICD-10-CM

## 2024-04-18 DIAGNOSIS — G62.9 NEUROPATHY: ICD-10-CM

## 2024-04-18 PROCEDURE — 3078F DIAST BP <80 MM HG: CPT | Performed by: NURSE PRACTITIONER

## 2024-04-18 PROCEDURE — 3074F SYST BP LT 130 MM HG: CPT | Performed by: NURSE PRACTITIONER

## 2024-04-18 PROCEDURE — 99215 OFFICE O/P EST HI 40 MIN: CPT | Performed by: NURSE PRACTITIONER

## 2024-04-18 RX ORDER — TEMAZEPAM 15 MG/1
CAPSULE ORAL
COMMUNITY
Start: 2024-04-01 | End: 2024-04-18 | Stop reason: SDUPTHER

## 2024-04-18 RX ORDER — PROPRANOLOL HYDROCHLORIDE 20 MG/1
20 TABLET ORAL 3 TIMES DAILY
Qty: 270 TABLET | Refills: 3 | Status: SHIPPED | OUTPATIENT
Start: 2024-04-18

## 2024-04-18 RX ORDER — TEMAZEPAM 30 MG/1
CAPSULE ORAL
Qty: 30 CAPSULE | Refills: 0 | Status: SHIPPED | OUTPATIENT
Start: 2024-04-18 | End: 2024-05-18

## 2024-04-18 RX ORDER — LISDEXAMFETAMINE DIMESYLATE CAPSULES 50 MG/1
50 CAPSULE ORAL DAILY
Qty: 30 CAPSULE | Refills: 0 | Status: SHIPPED | OUTPATIENT
Start: 2024-04-18 | End: 2024-05-18

## 2024-04-18 RX ORDER — LOSARTAN POTASSIUM 25 MG/1
25 TABLET ORAL DAILY
Qty: 90 TABLET | Refills: 1 | Status: SHIPPED | OUTPATIENT
Start: 2024-04-18

## 2024-04-18 RX ORDER — CITALOPRAM 20 MG/1
20 TABLET ORAL DAILY
Qty: 90 TABLET | Refills: 1 | Status: SHIPPED | OUTPATIENT
Start: 2024-04-18

## 2024-04-18 ASSESSMENT — ENCOUNTER SYMPTOMS
DIARRHEA: 0
SINUS PAIN: 0
NAUSEA: 0
TROUBLE SWALLOWING: 0
SHORTNESS OF BREATH: 0
COUGH: 0
SINUS PRESSURE: 0
EYE ITCHING: 0
ABDOMINAL PAIN: 0
VOMITING: 0
SORE THROAT: 0
EYE REDNESS: 0
CHEST TIGHTNESS: 0
WHEEZING: 0
EYE DISCHARGE: 0

## 2024-04-18 NOTE — PROGRESS NOTES
[facility]  [facility]  [address] [place] [address] [address]  [address]  [facility] [address]  Dept: [phone]  Dept Fax: [phone]    Jennifer Rojas is a 43 y.o. female who presents today for her medical conditions/complaintsas noted below.  Jennifer Rojas is c/o of Medication Check (1 mo f/u)        HPI:     Patient presents for 1 month follow-up  Blood pressure is stable  Weight is up 7 pounds since last visit    Patient presents for 1 month medication follow-up  She is falling asleep better and sleeping better but waking up every 2-3 hours for 30 min and falling back asleep. She is not up for as long in the night when she does wake up. No grogginess the next day     Vyvanse is working well. Keeps her focused some. Doesn't help much with binge eating.  She thinks that she could use an increase in the dose    Gabapentin only as needed now. Gabapentin does make her have brain fog but helps when she needs it.  She does not take it every day    Otherwise all the rest of her medications is going well for her.        Past Medical History:   Diagnosis Date    Abnormal pap     \" thought it was due to yeast infection\"    Allergic     heparin    Anemia     briefly RT bleeding    Asthma     Asthma  12/15/2015    Difficult intravenous access     May need PICC team for access.    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

## 2024-04-24 DIAGNOSIS — G62.9 NEUROPATHY: ICD-10-CM

## 2024-04-24 RX ORDER — GABAPENTIN 300 MG/1
300 CAPSULE ORAL NIGHTLY
Qty: 90 CAPSULE | Refills: 0 | Status: SHIPPED | OUTPATIENT
Start: 2024-04-24 | End: 2024-07-23

## 2024-04-24 RX ORDER — BUPROPION HYDROCHLORIDE 300 MG/1
300 TABLET ORAL EVERY MORNING
Qty: 30 TABLET | Refills: 1 | Status: SHIPPED | OUTPATIENT
Start: 2024-04-24

## 2024-04-26 ENCOUNTER — PATIENT MESSAGE (OUTPATIENT)
Dept: PRIMARY CARE CLINIC | Age: 44
End: 2024-04-26

## 2024-04-26 DIAGNOSIS — R73.03 PREDIABETES: ICD-10-CM

## 2024-04-26 DIAGNOSIS — G62.9 NEUROPATHY: Primary | ICD-10-CM

## 2024-04-26 DIAGNOSIS — I10 PRIMARY HYPERTENSION: ICD-10-CM

## 2024-04-26 NOTE — TELEPHONE ENCOUNTER
From: Jennifer Rojas  To: Nata Martinez  Sent: 4/26/2024 12:10 AM EDT  Subject: Ozempic     I know we talked about it a time or two at other appointments. Do you need to see me to put me on it? I think I want to try it to lose weight and keep my sugar down. I’m getting the same kind of headaches I was when it was elevated way before I started seeing you. Went to diet pop and it helped for a long time but it’s starting with the headaches again. So can you start me on ozempic without seeing me? I’m sure it’s also going to take a pre auth lol so maybe I’ll have a week or so in my system before I see you again next month lol.     Thank you!!!   Solange

## 2024-04-29 RX ORDER — SEMAGLUTIDE 0.68 MG/ML
0.25 INJECTION, SOLUTION SUBCUTANEOUS WEEKLY
Qty: 9 ML | Refills: 2 | Status: SHIPPED | OUTPATIENT
Start: 2024-04-29

## 2024-05-03 NOTE — TELEPHONE ENCOUNTER
Called [employer] and requested PA, was informed that PA form will be faxed to office. Verified fax number with representative

## 2024-05-10 ENCOUNTER — PATIENT MESSAGE (OUTPATIENT)
Dept: PRIMARY CARE CLINIC | Age: 44
End: 2024-05-10

## 2024-05-10 DIAGNOSIS — R73.03 PREDIABETES: Primary | ICD-10-CM

## 2024-05-10 NOTE — TELEPHONE ENCOUNTER
From: Jennifer Rojas  To: Nata Martinez  Sent: 5/10/2024 2:12 PM EDT  Subject: Ozempic    I just got a letter from [employer] saying the request was denied because my “physician did not respond to our request for additional information”. I am so sick of these people. I know Arnoldo requested the form for additional info days ago. So what do we do now? If you want to change the script, as long as it’s a pen injector I think I can do it. There is no way I can draw one up from a vial and stab myself. So with that in mind….. make your best decision lol

## 2024-05-15 DIAGNOSIS — G47.00 INSOMNIA, UNSPECIFIED TYPE: ICD-10-CM

## 2024-05-15 DIAGNOSIS — R63.2 BINGE EATING: ICD-10-CM

## 2024-05-15 RX ORDER — TEMAZEPAM 30 MG/1
CAPSULE ORAL
Qty: 30 CAPSULE | Refills: 0 | Status: SHIPPED | OUTPATIENT
Start: 2024-05-15 | End: 2024-06-15

## 2024-05-15 RX ORDER — LISDEXAMFETAMINE DIMESYLATE 50 MG
50 CAPSULE ORAL DAILY
Qty: 30 CAPSULE | Refills: 0 | Status: SHIPPED | OUTPATIENT
Start: 2024-05-15 | End: 2024-06-14

## 2024-06-04 ENCOUNTER — OFFICE VISIT (OUTPATIENT)
Dept: PRIMARY CARE CLINIC | Age: 44
End: 2024-06-04
Payer: COMMERCIAL

## 2024-06-04 VITALS
BODY MASS INDEX: 34.41 KG/M2 | SYSTOLIC BLOOD PRESSURE: 126 MMHG | DIASTOLIC BLOOD PRESSURE: 82 MMHG | RESPIRATION RATE: 16 BRPM | HEART RATE: 76 BPM | OXYGEN SATURATION: 100 % | WEIGHT: 233 LBS

## 2024-06-04 DIAGNOSIS — G47.00 INSOMNIA, UNSPECIFIED TYPE: ICD-10-CM

## 2024-06-04 DIAGNOSIS — G62.9 NEUROPATHY: ICD-10-CM

## 2024-06-04 DIAGNOSIS — G89.18 POST-OP PAIN: ICD-10-CM

## 2024-06-04 DIAGNOSIS — R73.03 PREDIABETES: ICD-10-CM

## 2024-06-04 DIAGNOSIS — I10 PRIMARY HYPERTENSION: Primary | ICD-10-CM

## 2024-06-04 DIAGNOSIS — R73.01 IMPAIRED FASTING GLUCOSE: ICD-10-CM

## 2024-06-04 DIAGNOSIS — R63.2 BINGE EATING: ICD-10-CM

## 2024-06-04 LAB — HBA1C MFR BLD: 5.3 %

## 2024-06-04 PROCEDURE — 83036 HEMOGLOBIN GLYCOSYLATED A1C: CPT | Performed by: NURSE PRACTITIONER

## 2024-06-04 PROCEDURE — 3079F DIAST BP 80-89 MM HG: CPT | Performed by: NURSE PRACTITIONER

## 2024-06-04 PROCEDURE — 3074F SYST BP LT 130 MM HG: CPT | Performed by: NURSE PRACTITIONER

## 2024-06-04 PROCEDURE — 99214 OFFICE O/P EST MOD 30 MIN: CPT | Performed by: NURSE PRACTITIONER

## 2024-06-04 RX ORDER — IBUPROFEN 800 MG/1
800 TABLET ORAL EVERY 8 HOURS PRN
Qty: 270 TABLET | Refills: 1 | Status: SHIPPED | OUTPATIENT
Start: 2024-06-04

## 2024-06-04 RX ORDER — HYDROXYZINE HYDROCHLORIDE 25 MG/1
TABLET, FILM COATED ORAL
Qty: 180 TABLET | Refills: 1 | Status: SHIPPED | OUTPATIENT
Start: 2024-06-04

## 2024-06-04 RX ORDER — GABAPENTIN 300 MG/1
300 CAPSULE ORAL NIGHTLY
Qty: 90 CAPSULE | Refills: 0 | Status: SHIPPED | OUTPATIENT
Start: 2024-06-04 | End: 2024-09-02

## 2024-06-04 RX ORDER — CITALOPRAM 20 MG/1
20 TABLET ORAL DAILY
Qty: 90 TABLET | Refills: 1 | Status: SHIPPED | OUTPATIENT
Start: 2024-06-04

## 2024-06-04 RX ORDER — LOSARTAN POTASSIUM 25 MG/1
25 TABLET ORAL DAILY
Qty: 90 TABLET | Refills: 1 | Status: SHIPPED | OUTPATIENT
Start: 2024-06-04

## 2024-06-04 RX ORDER — TEMAZEPAM 30 MG/1
CAPSULE ORAL
Qty: 30 CAPSULE | Refills: 0 | Status: SHIPPED | OUTPATIENT
Start: 2024-06-04 | End: 2024-07-05

## 2024-06-04 RX ORDER — GABAPENTIN 100 MG/1
100 CAPSULE ORAL DAILY
Qty: 90 CAPSULE | Refills: 1 | Status: SHIPPED | OUTPATIENT
Start: 2024-06-04 | End: 2024-12-01

## 2024-06-04 RX ORDER — PROPRANOLOL HYDROCHLORIDE 20 MG/1
20 TABLET ORAL 3 TIMES DAILY
Qty: 270 TABLET | Refills: 3 | Status: SHIPPED | OUTPATIENT
Start: 2024-06-04

## 2024-06-04 RX ORDER — ROPINIROLE 1 MG/1
1 TABLET, FILM COATED ORAL NIGHTLY
Qty: 30 TABLET | Refills: 1 | Status: SHIPPED | OUTPATIENT
Start: 2024-06-04

## 2024-06-04 RX ORDER — LISDEXAMFETAMINE DIMESYLATE 50 MG/1
50 CAPSULE ORAL DAILY
Qty: 30 CAPSULE | Refills: 0 | Status: SHIPPED | OUTPATIENT
Start: 2024-06-04 | End: 2024-07-04

## 2024-06-04 RX ORDER — TRAZODONE HYDROCHLORIDE 50 MG/1
50 TABLET ORAL NIGHTLY
Qty: 90 TABLET | Refills: 1 | Status: SHIPPED | OUTPATIENT
Start: 2024-06-04

## 2024-06-04 RX ORDER — VALACYCLOVIR HYDROCHLORIDE 500 MG/1
500 TABLET, FILM COATED ORAL DAILY
Qty: 90 TABLET | Refills: 3 | Status: SHIPPED | OUTPATIENT
Start: 2024-06-04

## 2024-06-04 RX ORDER — BUPROPION HYDROCHLORIDE 300 MG/1
300 TABLET ORAL EVERY MORNING
Qty: 90 TABLET | Refills: 1 | Status: SHIPPED | OUTPATIENT
Start: 2024-06-04

## 2024-06-04 RX ORDER — VALACYCLOVIR HYDROCHLORIDE 1 G/1
1000 TABLET, FILM COATED ORAL 2 TIMES DAILY
Qty: 20 TABLET | Refills: 0 | Status: SHIPPED | OUTPATIENT
Start: 2024-06-04

## 2024-06-04 RX ORDER — TIRZEPATIDE 2.5 MG/.5ML
2.5 INJECTION, SOLUTION SUBCUTANEOUS WEEKLY
Qty: 6 ML | Refills: 3 | Status: SHIPPED | OUTPATIENT
Start: 2024-06-04

## 2024-06-04 RX ORDER — TRAZODONE HYDROCHLORIDE 50 MG/1
50 TABLET ORAL PRN
COMMUNITY
Start: 2024-04-24 | End: 2024-06-04 | Stop reason: SDUPTHER

## 2024-06-04 ASSESSMENT — PATIENT HEALTH QUESTIONNAIRE - PHQ9: DEPRESSION UNABLE TO ASSESS: PT REFUSES

## 2024-06-04 NOTE — PROGRESS NOTES
[facility]  [facility] PR[facility]  [address] [place] [address]  [address]  [facility] [address]  Dept: [phone]  Dept Fax: [phone]    Jennifer Rojas is a 43 y.o. female who presents today for her medical conditions/complaintsas noted below.  Jennifer Rojas is c/o of Medication Check (Unable to get ozempic and Trulicity) and Blood Sugar Problem (Recent checked BS and was running around 145 mg)        HPI:     Patient presents for a follow-up  Blood pressure stable  Weight is down 6 pounds since last visit    Patient presents for a follow-up.  She is concerned for diabetes.  She does have a strong family history of diabetes.  Her blood sugar has been high at home. Recent fasting labs showed elevated glucose. Staying high over 100 t/o the day, before and after eating.   Over the weekend it was 145 fasting.   She is getting headaches again.     Takes Restoril only as needed  She has been alternating with trazadone.         Past Medical History:   Diagnosis Date    Abnormal pap     \" thought it was due to yeast infection\"    Allergic     heparin    Anemia     briefly RT bleeding    Asthma     Asthma  12/15/2015    Difficult intravenous access     May need PICC team for access.    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

## 2024-06-05 PROBLEM — M20.12 HALLUX VALGUS, LEFT: Status: RESOLVED | Noted: 2023-01-09 | Resolved: 2024-06-05

## 2024-06-05 PROBLEM — M79.671 RIGHT FOOT PAIN: Status: RESOLVED | Noted: 2023-06-26 | Resolved: 2024-06-05

## 2024-06-05 PROBLEM — M79.672 LEFT FOOT PAIN: Status: RESOLVED | Noted: 2023-12-11 | Resolved: 2024-06-05

## 2024-06-05 ASSESSMENT — ENCOUNTER SYMPTOMS
SHORTNESS OF BREATH: 0
TROUBLE SWALLOWING: 0
SINUS PAIN: 0
DIARRHEA: 0
CHEST TIGHTNESS: 0
NAUSEA: 0
WHEEZING: 0
COUGH: 0
SORE THROAT: 0
EYE ITCHING: 0
EYE DISCHARGE: 0
SINUS PRESSURE: 0
VOMITING: 0
ABDOMINAL PAIN: 0
EYE REDNESS: 0

## 2024-06-19 ENCOUNTER — PATIENT MESSAGE (OUTPATIENT)
Dept: PRIMARY CARE CLINIC | Age: 44
End: 2024-06-19

## 2024-06-19 DIAGNOSIS — R73.03 PREDIABETES: Primary | ICD-10-CM

## 2024-06-19 NOTE — TELEPHONE ENCOUNTER
From: Jennifer Rojas  To: Nata Martinez  Sent: 6/19/2024 2:24 PM EDT  Subject: Mounjaro    Hi!! [employer] should have a pre auth for diamonduro to send back. If that is declined can you try Wegov[employer] and also send the [facility] over that we already know is approved? I would prefer the Mounjaro or Wegovy but if they won’t approve them I’ll go back to [facility]. [facility] said they can get the low dose with no problem. Thank you so much for helping me navigate all this insurance BS. See you again in a couple weeks! Hopefully with some new meds!

## 2024-06-20 NOTE — TELEPHONE ENCOUNTER
Called [employer] and initiated PA for [employer]. Advised that PA form will be faxed to office. Verified fax number with representative   [facility] ambulatory encounter  ENT OFFICE VISIT    SUBJECTIVE:    Radha Norman is a 29 year old female who presents requesting evaluation for her throat and cough and hearing.  The patient presents with a .  The patient continues to have some cough, although seems to be improved with fluticasone nasal spray.  The patient continues to complain of some mild but chronic discomfort in the throat, which seems to go into the ears somewhat.  She is wanting to have her \"glands\" removed.  The patient also complains of some difficulties with food and drink getting stuck in the throat when she swallows.  The patient thinks that she might have some postnasal drip, but denies any anterior nasal discharge.  She does complain of some bitemporal pain, but denies any anterior facial pain.  The patient denies other exacerbating or relieving factors.  She denies other associated symptoms.    PAST HISTORIES:  I have reviewed the past medical history, family history, social history, medications and allergies listed in the medical record as obtained by my nursing staff and support staff and agree with their documentation     Review of systems:    Comprehensive ROS reviewed with patient.  Pertinent positives are listed in HPI.    PHYSICAL EXAM:    Vital Signs:  weight is 72 kg. Her blood pressure is 102/66 and her pulse is 67. Her oxygen saturation is 99%.     Constitutional: Patient is a well-nourished, well-developed 29 year old female in no distress with fluent speech, strong voice, no stridor, and unlabored respirations. Affect is calm and patient is alert.   Neurologic/Psych: Appropriate mood and affect.  Respiratory: No accessory muscle use.  Face/Head: Normocephalic. No masses. Face is strong and symmetric.  Irides normal.  Other cranial nerves III-XII grossly intact.      EARS:    Left external ear normally-formed. No lesions.  Otoscopic exam reveals the TM and EAC to be normal.  Right external ear  normally-formed.  No lesions.  Otoscopic exam reveals the TM and EAC to be normal.    ORAL CAVITY:     Lips: Mobile. No lesions.  Teeth: Dentition in good repair.  Gingivae: Without lesions.    NOSE:  External: No external masses or lesions seen.  Anterior rhinoscopy does not reveal any polyps, purulence, or masses, or evidence of competitions from nasal spray usage.    Neck: Visual exam without lesions or masses.     Audiology:  Testing was done today.  This reveals pure-tone's to be normal.  Speech audiometry was not done due to language barrier.  Tympanometry is normal.    Assessment:   1. Chronic cough    2. Oropharyngeal dysphagia    3. Otosclerosis, bilateral    4. LPRD (laryngopharyngeal reflux disease)    The patient's chronic cough seems to be associated with some issues with dysphagia.  She might also have an element of chronic tonsillitis.  CT findings of possible otosclerosis are not corroborated on audiometry.  Hearing testing was normal today.      PLAN:    The patient should continue on her omeprazole.  Will obtain a video fluoroscopic swallow study and esophagram.  Return visit when the above is complete.  I did briefly discuss tonsillectomy with the patient.  However, the patient does not wish to undergo the surgery due to the expected post-tonsillectomy pain/morbidity.    Instructions provided as documented in the After Visit Summary.      The patient indicates understanding of the diagnosis and agrees with the plan of care.

## 2024-07-09 ENCOUNTER — OFFICE VISIT (OUTPATIENT)
Dept: PRIMARY CARE CLINIC | Age: 44
End: 2024-07-09
Payer: COMMERCIAL

## 2024-07-09 VITALS
BODY MASS INDEX: 34.02 KG/M2 | SYSTOLIC BLOOD PRESSURE: 118 MMHG | WEIGHT: 230.4 LBS | OXYGEN SATURATION: 99 % | HEART RATE: 79 BPM | DIASTOLIC BLOOD PRESSURE: 74 MMHG | RESPIRATION RATE: 16 BRPM

## 2024-07-09 DIAGNOSIS — I10 PRIMARY HYPERTENSION: Primary | ICD-10-CM

## 2024-07-09 DIAGNOSIS — G47.00 INSOMNIA, UNSPECIFIED TYPE: ICD-10-CM

## 2024-07-09 DIAGNOSIS — R73.03 PREDIABETES: ICD-10-CM

## 2024-07-09 DIAGNOSIS — R06.02 SOB (SHORTNESS OF BREATH): ICD-10-CM

## 2024-07-09 DIAGNOSIS — E66.09 CLASS 1 OBESITY DUE TO EXCESS CALORIES WITH SERIOUS COMORBIDITY AND BODY MASS INDEX (BMI) OF 34.0 TO 34.9 IN ADULT: ICD-10-CM

## 2024-07-09 PROCEDURE — 3078F DIAST BP <80 MM HG: CPT | Performed by: NURSE PRACTITIONER

## 2024-07-09 PROCEDURE — 3074F SYST BP LT 130 MM HG: CPT | Performed by: NURSE PRACTITIONER

## 2024-07-09 PROCEDURE — 99214 OFFICE O/P EST MOD 30 MIN: CPT | Performed by: NURSE PRACTITIONER

## 2024-07-09 ASSESSMENT — PATIENT HEALTH QUESTIONNAIRE - PHQ9
SUM OF ALL RESPONSES TO PHQ9 QUESTIONS 1 & 2: 0
SUM OF ALL RESPONSES TO PHQ QUESTIONS 1-9: 0
SUM OF ALL RESPONSES TO PHQ QUESTIONS 1-9: 0
2. FEELING DOWN, DEPRESSED OR HOPELESS: NOT AT ALL
1. LITTLE INTEREST OR PLEASURE IN DOING THINGS: NOT AT ALL
SUM OF ALL RESPONSES TO PHQ QUESTIONS 1-9: 0
SUM OF ALL RESPONSES TO PHQ QUESTIONS 1-9: 0

## 2024-07-09 ASSESSMENT — ENCOUNTER SYMPTOMS
SORE THROAT: 0
SINUS PAIN: 0
SINUS PRESSURE: 0
DIARRHEA: 0
NAUSEA: 0
VOMITING: 0
EYE ITCHING: 0
COUGH: 0
SHORTNESS OF BREATH: 0
ABDOMINAL PAIN: 0
TROUBLE SWALLOWING: 0
CHEST TIGHTNESS: 0
EYE REDNESS: 0
EYE DISCHARGE: 0
WHEEZING: 0

## 2024-07-09 NOTE — PROGRESS NOTES
Bronchodilator     If an ABG is needed along with this PFT procedure, please place the appropriate lab order     Standing Status:   Future     Standing Expiration Date:   7/9/2025     No orders of the defined types were placed in this encounter.      Patient given educational materials - seepatient instructions.  Discussed use, benefit, and side effects of prescribed medications.All patient questions answered.  Pt voiced understanding. Reviewed health maintenance.Instructed to continue current medications, diet and exercise.  Patient agreedwith treatment plan. Follow up as directed.      Electronically signed by CHERELLE Turcios CNP on 7/9/2024at 4:25 PM

## 2024-07-11 ENCOUNTER — PATIENT MESSAGE (OUTPATIENT)
Dept: PRIMARY CARE CLINIC | Age: 44
End: 2024-07-11

## 2024-07-11 ENCOUNTER — HOSPITAL ENCOUNTER (OUTPATIENT)
Dept: PULMONOLOGY | Age: 44
Discharge: HOME | End: 2024-07-11
Payer: COMMERCIAL

## 2024-07-11 DIAGNOSIS — R00.2 PALPITATIONS: Primary | ICD-10-CM

## 2024-07-11 DIAGNOSIS — R06.02 SOB (SHORTNESS OF BREATH): ICD-10-CM

## 2024-07-11 PROCEDURE — 94664 DEMO&/EVAL PT USE INHALER: CPT

## 2024-07-11 PROCEDURE — 94010 BREATHING CAPACITY TEST: CPT

## 2024-07-11 PROCEDURE — 94726 PLETHYSMOGRAPHY LUNG VOLUMES: CPT

## 2024-07-11 PROCEDURE — 94640 AIRWAY INHALATION TREATMENT: CPT

## 2024-07-11 PROCEDURE — 94060 EVALUATION OF WHEEZING: CPT

## 2024-07-11 PROCEDURE — 94729 DIFFUSING CAPACITY: CPT

## 2024-07-11 NOTE — TELEPHONE ENCOUNTER
From: Jennifer Rojas  To: Nata Martinez  Sent: 7/11/2024 4:52 PM EDT  Subject: Lung test    Hey! It’s done. No asthma but they did give me an albutetol treatment for it and it helped tremendously. Would you be willing to call me in an albuterol inhaler at least until we get this figured out. I think I need a full cardiac work up. I was talking to the RT and described something my heart has been doing. It flutters. It’s like it goes Lub dub lub dub lub dub dub dub dub dub dub………lub dub lub dub. It’s done it for years so I just assume that was normal lol. So will you call me in an inhaler? Do you want me to come see you for said cardio work up? Do you want me to come see you to talk about all this? Where do we go from here?

## 2024-07-12 RX ORDER — ALBUTEROL SULFATE 90 UG/1
2 AEROSOL, METERED RESPIRATORY (INHALATION) 4 TIMES DAILY PRN
Qty: 18 G | Refills: 5 | Status: SHIPPED | OUTPATIENT
Start: 2024-07-12

## 2024-07-13 NOTE — PROCEDURES
[address] [address]                           PULMONARY FUNCTION      PATIENT NAME: LIV ORR          : 1980  MED REC NO: [id number]                         ROOM:   ACCOUNT NO: [id number]                       ADMIT DATE: 2024  PROVIDER: Laney Hughes MD      DATE OF PROCEDURE: 2024    Spirometry shows FVC is 4.85, 116% of predicted.  FEV1 is 3.89, 114% of predicted.  FEV1-FVC ratio is normal.  Post bronchodilator, there is no significant response to bronchodilator.  Lung volume shows total lung capacity is 5.53, 95% of predicted, which is within normal limits.  Diffusion capacity is 25.64, 106% of predicted, which is within normal limits.    IMPRESSION:  This pulmonary function test shows normal spirometry, normal lung volume, and normal diffusion capacity.  Normal pulmonary function test.  Clinical correlation is recommended.          LANEY HUGHES MD      D:  2024 19:52:24     T:  2024 17:55:54     KHANH/JONATAN  Job #:  [id number]     Doc#:  [id number]

## 2024-07-23 ENCOUNTER — HOSPITAL ENCOUNTER (OUTPATIENT)
Age: 44
Discharge: HOME | End: 2024-07-25
Payer: COMMERCIAL

## 2024-07-23 ENCOUNTER — PATIENT MESSAGE (OUTPATIENT)
Dept: PRIMARY CARE CLINIC | Age: 44
End: 2024-07-23

## 2024-07-23 DIAGNOSIS — R63.2 BINGE EATING: ICD-10-CM

## 2024-07-23 DIAGNOSIS — R00.2 PALPITATIONS: ICD-10-CM

## 2024-07-23 DIAGNOSIS — G47.00 INSOMNIA, UNSPECIFIED TYPE: ICD-10-CM

## 2024-07-23 PROCEDURE — 93246 EXT ECG>7D<15D RECORDING: CPT

## 2024-07-24 RX ORDER — LISDEXAMFETAMINE DIMESYLATE 50 MG/1
50 CAPSULE ORAL DAILY
Qty: 30 CAPSULE | Refills: 0 | Status: SHIPPED | OUTPATIENT
Start: 2024-07-24 | End: 2024-08-23

## 2024-07-24 RX ORDER — TEMAZEPAM 30 MG/1
CAPSULE ORAL
Qty: 30 CAPSULE | Refills: 0 | Status: SHIPPED | OUTPATIENT
Start: 2024-07-24 | End: 2024-08-24

## 2024-07-24 NOTE — TELEPHONE ENCOUNTER
From: Jennifer Rojas  To: Nata Martinez  Sent: 7/23/2024 11:39 PM EDT  Subject: Refills    Hey! I need a refill on my vyvanse and my temazepam please. I have like 3 left. Thank you!!!

## 2024-08-12 RX ORDER — VALACYCLOVIR HYDROCHLORIDE 1 G/1
1000 TABLET, FILM COATED ORAL 2 TIMES DAILY
Qty: 20 TABLET | Refills: 0 | Status: SHIPPED | OUTPATIENT
Start: 2024-08-12

## 2024-08-13 ENCOUNTER — TELEMEDICINE (OUTPATIENT)
Dept: PRIMARY CARE CLINIC | Age: 44
End: 2024-08-13
Payer: COMMERCIAL

## 2024-08-13 DIAGNOSIS — R00.2 PALPITATIONS: ICD-10-CM

## 2024-08-13 DIAGNOSIS — G47.00 INSOMNIA, UNSPECIFIED TYPE: Primary | ICD-10-CM

## 2024-08-13 DIAGNOSIS — E66.09 CLASS 1 OBESITY DUE TO EXCESS CALORIES WITH SERIOUS COMORBIDITY AND BODY MASS INDEX (BMI) OF 34.0 TO 34.9 IN ADULT: ICD-10-CM

## 2024-08-13 PROCEDURE — 99214 OFFICE O/P EST MOD 30 MIN: CPT | Performed by: NURSE PRACTITIONER

## 2024-08-13 ASSESSMENT — ENCOUNTER SYMPTOMS
CHEST TIGHTNESS: 0
EYE REDNESS: 0
TROUBLE SWALLOWING: 0
SHORTNESS OF BREATH: 0
ABDOMINAL PAIN: 0
EYE DISCHARGE: 0
SORE THROAT: 0
VOMITING: 0
WHEEZING: 0
EYE ITCHING: 0
SINUS PRESSURE: 0
SINUS PAIN: 0
NAUSEA: 0
DIARRHEA: 0
COUGH: 0

## 2024-08-13 NOTE — PROGRESS NOTES
well-nourished [x] No apparent distress      [] Abnormal-   Mental status  [x] Alert and awake  [x] Oriented to person/place/time [x]Able to follow commands      Eyes:  EOM    [x]  Normal  [] Abnormal-  Sclera  []  Normal  [] Abnormal -         Discharge []  None visible  [] Abnormal -    HENT:   [x] Normocephalic, atraumatic.  [] Abnormal   [x] Mouth/Throat: Mucous membranes are moist.     External Ears [x] Normal  [] Abnormal-     Neck: [x] No visualized mass     Pulmonary/Chest: [x] Respiratory effort normal.  [x] No visualized signs of difficulty breathing or respiratory distress        [] Abnormal-      Musculoskeletal:   [] Normal gait with no signs of ataxia         [x] Normal range of motion of neck        [] Abnormal-       Neurological:        [x] No Facial Asymmetry (Cranial nerve 7 motor function) (limited exam to video visit)          [] No gaze palsy        [] Abnormal-         Skin:        [x] No significant exanthematous lesions or discoloration noted on facial skin         [] Abnormal-            Psychiatric:       [x] Normal Affect [] No Hallucinations        [] Abnormal-     ASSESSMENT/PLAN:  1. Insomnia, unspecified type  -Improved  Continue with Restoril 30 mg nightly.  She does occasionally take trazodone 50 mg nightly with hydroxyzine.  She does not take this with the Restoril.    2. Palpitations  -She did complete the Holter monitor.  Results are pending.  She did have issues with the device.  Her palpitations have improved  3. Class 1 obesity due to excess calories with serious comorbidity and body mass index (BMI) of 34.0 to 34.9 in adult  Patient is on compounded semaglutide.  She is doing well on it so far.  She is on the lowest dose and is already down 4 pounds.  Her starting weight was 230 pounds.  She is down to 226 pounds    Plan for her to follow-up in 8 weeks.  May return sooner if needed      Return in about 8 weeks (around 10/8/2024) for weight follow up.    Jennifer Rojas,

## 2024-09-17 ENCOUNTER — PATIENT MESSAGE (OUTPATIENT)
Dept: PRIMARY CARE CLINIC | Age: 44
End: 2024-09-17

## 2024-09-17 DIAGNOSIS — G47.00 INSOMNIA, UNSPECIFIED TYPE: Primary | ICD-10-CM

## 2024-09-17 DIAGNOSIS — R63.2 BINGE EATING: ICD-10-CM

## 2024-09-18 RX ORDER — TEMAZEPAM 30 MG
30 CAPSULE ORAL NIGHTLY PRN
Qty: 30 CAPSULE | Refills: 0 | Status: SHIPPED | OUTPATIENT
Start: 2024-09-18 | End: 2024-10-18

## 2024-09-18 RX ORDER — LISDEXAMFETAMINE DIMESYLATE 50 MG/1
50 CAPSULE ORAL DAILY
Qty: 30 CAPSULE | Refills: 0 | Status: SHIPPED | OUTPATIENT
Start: 2024-09-18 | End: 2024-10-18

## 2024-10-05 ENCOUNTER — PATIENT MESSAGE (OUTPATIENT)
Dept: PRIMARY CARE CLINIC | Age: 44
End: 2024-10-05

## 2024-10-17 ENCOUNTER — TELEMEDICINE (OUTPATIENT)
Dept: PRIMARY CARE CLINIC | Age: 44
End: 2024-10-17
Payer: COMMERCIAL

## 2024-10-17 DIAGNOSIS — E66.09 CLASS 1 OBESITY DUE TO EXCESS CALORIES WITH SERIOUS COMORBIDITY AND BODY MASS INDEX (BMI) OF 34.0 TO 34.9 IN ADULT: ICD-10-CM

## 2024-10-17 DIAGNOSIS — I10 PRIMARY HYPERTENSION: Primary | ICD-10-CM

## 2024-10-17 DIAGNOSIS — E66.811 CLASS 1 OBESITY DUE TO EXCESS CALORIES WITH SERIOUS COMORBIDITY AND BODY MASS INDEX (BMI) OF 34.0 TO 34.9 IN ADULT: ICD-10-CM

## 2024-10-17 DIAGNOSIS — R63.2 BINGE EATING: ICD-10-CM

## 2024-10-17 DIAGNOSIS — G47.00 INSOMNIA, UNSPECIFIED TYPE: ICD-10-CM

## 2024-10-17 PROCEDURE — 99214 OFFICE O/P EST MOD 30 MIN: CPT | Performed by: NURSE PRACTITIONER

## 2024-10-17 NOTE — PROGRESS NOTES
1 tablet by mouth daily  Nata Martinez APRN - CNP   gabapentin (NEURONTIN) 100 MG capsule Take 1 capsule by mouth daily for 180 days.  Nata Martinez APRN - CNP   gabapentin (NEURONTIN) 300 MG capsule Take 1 capsule by mouth nightly for 90 days.  Nata Martinez APRN - CNP   hydrOXYzine HCl (ATARAX) 25 MG tablet TAKE  2 TABLETS BY MOUTH AT BEDTIME AS NEEDED  Nata Martinez APRN - CNP   ibuprofen (ADVIL;MOTRIN) 800 MG tablet Take 1 tablet by mouth every 8 hours as needed for Pain  Nata Martinez APRN - CNP   losartan (COZAAR) 25 MG tablet Take 1 tablet by mouth daily  Nata Martinez APRN - CNP   propranolol (INDERAL) 20 MG tablet Take 1 tablet by mouth 3 times daily  Nata Martinez APRN - CNP   rOPINIRole (REQUIP) 1 MG tablet Take 1 tablet by mouth at bedtime  Nata Martinez APRN - CNP   valACYclovir (VALTREX) 500 MG tablet Take 1 tablet by mouth daily  Nata Martinez APRN - CNP   traZODone (DESYREL) 50 MG tablet Take 1 tablet by mouth nightly  Nata Martinez APRN - CNP   lidocaine (XYLOCAINE) 5 % ointment Apply topically as needed.  Nata Martinez APRN - CNP       Social History     Tobacco Use    Smoking status: Never    Smokeless tobacco: Never   Vaping Use    Vaping status: Never Used   Substance Use Topics    Alcohol use: Yes     Alcohol/week: 0.0 standard drinks of alcohol     Comment: rarely    Drug use: No        Allergies   Allergen Reactions    Heparin Anaphylaxis    Codeine Hives    Lisinopril Cough   ,   Past Medical History:   Diagnosis Date    Abnormal pap     \" thought it was due to yeast infection\"    Allergic     heparin    Anemia     briefly RT bleeding    Asthma     Asthma  12/15/2015    Difficult intravenous access     May need PICC team for access.    Gestational HTN 06/27/2016    History of recurrent UTIs     frequent UTI during past pregnancy    HTN (hypertension)     Hx of blood clots 1996    small clot behind knee after surgery in 1996 surgeries since w/o

## 2024-10-19 ASSESSMENT — ENCOUNTER SYMPTOMS
COUGH: 0
SINUS PAIN: 0
VOMITING: 0
EYE DISCHARGE: 0
SHORTNESS OF BREATH: 0
CHEST TIGHTNESS: 0
TROUBLE SWALLOWING: 0
NAUSEA: 0
EYE REDNESS: 0
SORE THROAT: 0
SINUS PRESSURE: 0
DIARRHEA: 0
WHEEZING: 0
ABDOMINAL PAIN: 0
EYE ITCHING: 0

## 2024-11-06 DIAGNOSIS — G62.9 NEUROPATHY: ICD-10-CM

## 2024-11-06 RX ORDER — GABAPENTIN 300 MG/1
300 CAPSULE ORAL NIGHTLY
Qty: 90 CAPSULE | Refills: 1 | Status: SHIPPED | OUTPATIENT
Start: 2024-11-06 | End: 2025-05-05

## 2024-11-13 ENCOUNTER — PATIENT MESSAGE (OUTPATIENT)
Dept: PRIMARY CARE CLINIC | Age: 44
End: 2024-11-13

## 2024-11-13 RX ORDER — MINOXIDIL 2.5 MG/1
2.5 TABLET ORAL DAILY
Qty: 30 TABLET | Refills: 3 | Status: SHIPPED | OUTPATIENT
Start: 2024-11-13 | End: 2025-11-13

## 2024-12-04 RX ORDER — ROPINIROLE 1 MG/1
1 TABLET, FILM COATED ORAL NIGHTLY
Qty: 90 TABLET | Refills: 1 | Status: SHIPPED | OUTPATIENT
Start: 2024-12-04

## 2025-04-28 ENCOUNTER — PATIENT MESSAGE (OUTPATIENT)
Dept: PRIMARY CARE CLINIC | Age: 45
End: 2025-04-28

## 2025-04-28 DIAGNOSIS — G47.00 INSOMNIA, UNSPECIFIED TYPE: ICD-10-CM

## 2025-04-28 DIAGNOSIS — I10 PRIMARY HYPERTENSION: ICD-10-CM

## 2025-04-29 RX ORDER — LOSARTAN POTASSIUM 25 MG/1
25 TABLET ORAL DAILY
Qty: 90 TABLET | Refills: 1 | Status: SHIPPED | OUTPATIENT
Start: 2025-04-29

## 2025-04-29 RX ORDER — CITALOPRAM HYDROBROMIDE 20 MG/1
20 TABLET ORAL DAILY
Qty: 90 TABLET | Refills: 1 | Status: SHIPPED | OUTPATIENT
Start: 2025-04-29

## 2025-04-29 RX ORDER — TEMAZEPAM 30 MG/1
30 CAPSULE ORAL NIGHTLY PRN
Qty: 30 CAPSULE | Refills: 0 | Status: SHIPPED | OUTPATIENT
Start: 2025-04-29 | End: 2025-05-29

## 2025-05-15 ENCOUNTER — OFFICE VISIT (OUTPATIENT)
Dept: PRIMARY CARE CLINIC | Age: 45
End: 2025-05-15

## 2025-05-15 VITALS
SYSTOLIC BLOOD PRESSURE: 120 MMHG | HEART RATE: 81 BPM | BODY MASS INDEX: 35.55 KG/M2 | WEIGHT: 240 LBS | RESPIRATION RATE: 14 BRPM | OXYGEN SATURATION: 98 % | DIASTOLIC BLOOD PRESSURE: 82 MMHG | HEIGHT: 69 IN

## 2025-05-15 DIAGNOSIS — G62.9 NEUROPATHY: ICD-10-CM

## 2025-05-15 DIAGNOSIS — E66.811 CLASS 1 OBESITY DUE TO EXCESS CALORIES WITH SERIOUS COMORBIDITY AND BODY MASS INDEX (BMI) OF 34.0 TO 34.9 IN ADULT: ICD-10-CM

## 2025-05-15 DIAGNOSIS — E66.09 CLASS 1 OBESITY DUE TO EXCESS CALORIES WITH SERIOUS COMORBIDITY AND BODY MASS INDEX (BMI) OF 34.0 TO 34.9 IN ADULT: ICD-10-CM

## 2025-05-15 DIAGNOSIS — F32.A MILD DEPRESSION: Primary | ICD-10-CM

## 2025-05-15 DIAGNOSIS — I10 PRIMARY HYPERTENSION: ICD-10-CM

## 2025-05-15 DIAGNOSIS — G25.81 RLS (RESTLESS LEGS SYNDROME): ICD-10-CM

## 2025-05-15 DIAGNOSIS — R63.2 BINGE EATING: ICD-10-CM

## 2025-05-15 DIAGNOSIS — H61.22 IMPACTED CERUMEN OF LEFT EAR: ICD-10-CM

## 2025-05-15 DIAGNOSIS — G47.00 INSOMNIA, UNSPECIFIED TYPE: ICD-10-CM

## 2025-05-15 RX ORDER — LISDEXAMFETAMINE DIMESYLATE 40 MG/1
40 CAPSULE ORAL DAILY
Qty: 30 CAPSULE | Refills: 0 | Status: SHIPPED | OUTPATIENT
Start: 2025-05-15 | End: 2025-06-14

## 2025-05-15 RX ORDER — CITALOPRAM HYDROBROMIDE 40 MG/1
40 TABLET ORAL DAILY
Qty: 90 TABLET | Refills: 1 | Status: SHIPPED | OUTPATIENT
Start: 2025-05-15

## 2025-05-15 SDOH — ECONOMIC STABILITY: FOOD INSECURITY: WITHIN THE PAST 12 MONTHS, THE FOOD YOU BOUGHT JUST DIDN'T LAST AND YOU DIDN'T HAVE MONEY TO GET MORE.: NEVER TRUE

## 2025-05-15 SDOH — ECONOMIC STABILITY: FOOD INSECURITY: WITHIN THE PAST 12 MONTHS, YOU WORRIED THAT YOUR FOOD WOULD RUN OUT BEFORE YOU GOT MONEY TO BUY MORE.: NEVER TRUE

## 2025-05-15 ASSESSMENT — PATIENT HEALTH QUESTIONNAIRE - PHQ9
SUM OF ALL RESPONSES TO PHQ QUESTIONS 1-9: 0
2. FEELING DOWN, DEPRESSED OR HOPELESS: NOT AT ALL
SUM OF ALL RESPONSES TO PHQ QUESTIONS 1-9: 0
1. LITTLE INTEREST OR PLEASURE IN DOING THINGS: NOT AT ALL

## 2025-05-15 NOTE — PROGRESS NOTES
2014    exploratory laparoscopy    LASIK Bilateral 2013    OTHER SURGICAL HISTORY Bilateral 2019    XI LAPAROSCOPIC ROBOTIC ABDOMINAL HYSTERECTOMY, BILATERAL SALPINGECTOMY, LEFT OVARIAN CYSTECTOMY, CYSTOSCOPY    TX INDUCED  DILATION & EVACUATION N/A 2017    DILATATION AND CURETTAGE SUCTION performed by Shanae Zhou MD at [facility] OR    WISDOM TOOTH EXTRACTION  2000       Family History   Problem Relation Age of Onset    Diabetes Mother     High Blood Pressure Father     Cancer Paternal Uncle 52        throat/ smoked and drank    Clotting Disorder Maternal Grandmother         blood clots    Heart Attack Maternal Grandfather 64         MI    Alzheimer's Disease Paternal Grandmother     Macular Degen Paternal Grandfather     Heart Attack Paternal Grandfather 68        MI       Social History     Tobacco Use    Smoking status: Never    Smokeless tobacco: Never   Substance Use Topics    Alcohol use: Yes     Alcohol/week: 0.0 standard drinks of alcohol     Comment: rarely      Current Outpatient Medications   Medication Sig Dispense Refill    citalopram (CELEXA) 40 MG tablet Take 1 tablet by mouth daily 90 tablet 1    Lisdexamfetamine Dimesylate (VYVANSE) 40 MG CAPS Take 40 mg by mouth daily for 30 days. Max Daily Amount: 40 mg 30 capsule 0    losartan (COZAAR) 25 MG tablet Take 1 tablet by mouth daily 90 tablet 1    temazepam (RESTORIL) 30 MG capsule Take 1 capsule by mouth nightly as needed for Sleep for up to 30 days. Max Daily Amount: 30 mg 30 capsule 0    rOPINIRole (REQUIP) 1 MG tablet TAKE 1 TABLET BY MOUTH AT BEDTIME 90 tablet 1    minoxidil (LONITEN) 2.5 MG tablet Take 1 tablet by mouth daily 30 tablet 3    gabapentin (NEURONTIN) 300 MG capsule Take 1 capsule by mouth nightly for 180 days. 90 capsule 1    SEMAGLUTIDE-WEIGHT MANAGEMENT SC Inject 0.3 mg into the skin once a week      valACYclovir (VALTREX) 1 g tablet Take 1 tablet by mouth 2 times daily Start within 72 hours of

## 2025-05-17 ASSESSMENT — ENCOUNTER SYMPTOMS
NAUSEA: 0
WHEEZING: 0
SHORTNESS OF BREATH: 0
CHEST TIGHTNESS: 0
EYE DISCHARGE: 0
EYE REDNESS: 0
SINUS PRESSURE: 0
COUGH: 0
ABDOMINAL PAIN: 0
SORE THROAT: 0
EYE ITCHING: 0
VOMITING: 0
DIARRHEA: 0
TROUBLE SWALLOWING: 0
SINUS PAIN: 0

## 2025-06-10 ENCOUNTER — PATIENT MESSAGE (OUTPATIENT)
Dept: PRIMARY CARE CLINIC | Age: 45
End: 2025-06-10

## 2025-07-03 ENCOUNTER — PATIENT MESSAGE (OUTPATIENT)
Dept: PRIMARY CARE CLINIC | Age: 45
End: 2025-07-03

## 2025-07-03 RX ORDER — CYCLOBENZAPRINE HCL 10 MG
10 TABLET ORAL 3 TIMES DAILY PRN
Qty: 21 TABLET | Refills: 0 | Status: SHIPPED | OUTPATIENT
Start: 2025-07-03 | End: 2025-07-13

## (undated) DEVICE — PAD,ABDOMINAL,5"X9",ST,LF,25/BX: Brand: MEDLINE INDUSTRIES, INC.

## (undated) DEVICE — 3M™ STERI-STRIP™ COMPOUND BENZOIN TINCTURE 40 BAGS/CARTON 4 CARTONS/CASE C1544: Brand: 3M™ STERI-STRIP™

## (undated) DEVICE — STRAP RESTRAIN W3.5XL19IN TECLIN STRRP POS LEG DURING LITH

## (undated) DEVICE — SINGLE PORT MANIFOLD: Brand: NEPTUNE 2

## (undated) DEVICE — BANDAGE,ELASTIC,ESMARK,STERILE,4"X9',LF: Brand: MEDLINE

## (undated) DEVICE — NEEDLE SPNL 22GA L3.5IN BLK HUB S STL REG WALL FIT STYL W/

## (undated) DEVICE — SCISSOR SURG METZ CRV TIP

## (undated) DEVICE — UNTHREADED GUIDE WIRE: Brand: FIXOS

## (undated) DEVICE — DRAPE,REIN 53X77,STERILE: Brand: MEDLINE

## (undated) DEVICE — ELECTRO LUBE IS A SINGLE PATIENT USE DEVICE THAT IS INTENDED TO BE USED ON ELECTROSURGICAL ELECTRODES TO REDUCE STICKING.: Brand: KEY SURGICAL ELECTRO LUBE

## (undated) DEVICE — PREP SOL PVP IODINE 4%  4 OZ/BTL

## (undated) DEVICE — CANNULA SEAL

## (undated) DEVICE — TIP COVER ACCESSORY

## (undated) DEVICE — GOWN,AURORA,NONREINFORCED,LARGE: Brand: MEDLINE

## (undated) DEVICE — CANNULATED COUNTERSINK: Brand: FIXOS

## (undated) DEVICE — SOLUTION ANTIFOG VIS SYS CLEARIFY LAPSCP

## (undated) DEVICE — SUTURE MCRYL + SZ 4-0 L27IN ABSRB UD L19MM PS-2 3/8 CIR MCP426H

## (undated) DEVICE — C-ARMOR C-ARM EQUIPMENT COVERS CLEAR STERILE UNIVERSAL FIT 12 PER CASE: Brand: C-ARMOR

## (undated) DEVICE — SUTURE VCRL + SZ 2-0 L27IN ABSRB UD CT-2 L26MM 1/2 CIR TAPR VCP269H

## (undated) DEVICE — BLADELESS OBTURATOR: Brand: WECK VISTA

## (undated) DEVICE — DRESSING PETRO W3XL3IN OIL EMUL N ADH GZ KNIT IMPREG CELOS

## (undated) DEVICE — Z DISCONTINUED USE 2659133 CANNULA VAC DIA8MM CRV SEMI RIG W/ ROUNDED TIP TAPR END

## (undated) DEVICE — GLOVE SURG SZ 85 L12IN FNGR ORTHO 126MIL CRM LTX FREE

## (undated) DEVICE — SET COLL TBNG L6FT DIA3/8IN W/ INTEGR SWVL HNDL SLIP RNG M

## (undated) DEVICE — GOWN,AURORA,NONRNF,XL,30/CS: Brand: MEDLINE

## (undated) DEVICE — GARMENT,MEDLINE,DVT,INT,CALF,MED, GEN2: Brand: MEDLINE

## (undated) DEVICE — TOWEL,OR,DSP,ST,NATURAL,DLX,4/PK,20PK/CS: Brand: MEDLINE

## (undated) DEVICE — SUTURE PROL SZ 4-0 L18IN NONABSORBABLE BLU L19MM FS-2 3/8 8683G

## (undated) DEVICE — ZIMMER® STERILE DISPOSABLE TOURNIQUET CUFF WITH PLC, DUAL PORT, SINGLE BLADDER, 18 IN. (46 CM)

## (undated) DEVICE — GLOVE SURG SZ 6 THK91MIL LTX FREE SYN POLYISOPRENE ANTI

## (undated) DEVICE — ARM DRAPE

## (undated) DEVICE — SVMMC GYN MIN PK

## (undated) DEVICE — BLADE SAW SAG 31X9X0.38 MM OSCILLATION FOR SM BNE

## (undated) DEVICE — INTENDED FOR TISSUE SEPARATION, AND OTHER PROCEDURES THAT REQUIRE A SHARP SURGICAL BLADE TO PUNCTURE OR CUT.: Brand: BARD-PARKER ® CARBON RIB-BACK BLADES

## (undated) DEVICE — COVER LT HNDL BLU PLAS

## (undated) DEVICE — DISCONTINUED USE 394504 SYRINGE LUER LOCK TIP 12 ML

## (undated) DEVICE — SUTURE ETHLN SZ 4-0 L18IN NONABSORBABLE BLK L24MM FS-1 3/8 1629H

## (undated) DEVICE — APPLICATOR SURG L14CM ARISTA AH FLEXTIP

## (undated) DEVICE — K-WIRE
Type: IMPLANTABLE DEVICE | Site: FOOT | Status: NON-FUNCTIONAL
Removed: 2023-01-04

## (undated) DEVICE — BLANKET WRM W29.9XL79.1IN UP BODY FORC AIR MISTRAL-AIR

## (undated) DEVICE — SUTURE ETHBND EXCEL SZ 0 L30IN NONABSORBABLE GRN L26MM CT-2 X412H

## (undated) DEVICE — CYSTO/BLADDER IRRIGATION SET, REGULATING CLAMP

## (undated) DEVICE — MERCY HEALTH ST CHARLES: Brand: MEDLINE INDUSTRIES, INC.

## (undated) DEVICE — SHEET DRAPE FULL 70X100

## (undated) DEVICE — CANNULATED DRILL: Brand: FIXOS

## (undated) DEVICE — THREADED GUIDE WIRE

## (undated) DEVICE — HOSE CONN L18IN UTER DISP FOR BERK SAFETOUCH SYS

## (undated) DEVICE — APPLICATOR SURG XL L38CM FOR ARISTA ABSRB HEMSTAT FLEXITIP

## (undated) DEVICE — GLOVE ORANGE PI 7 1/2   MSG9075

## (undated) DEVICE — TRAP TISS DISP FOR COLL SYS BERK SAFETOUCH

## (undated) DEVICE — SUTURE NONABSORBABLE BRAIDED 2-0 CT-2 1X30 IN ETHBND EXCEL X411H

## (undated) DEVICE — TOTAL TRAY, 16FR 10ML SIL FOLEY, URN: Brand: MEDLINE

## (undated) DEVICE — GLOVE ORANGE PI 7   MSG9070

## (undated) DEVICE — COVER OR TBL W40XL90IN ABSRB STD AND GRIPPY BK SAHARA

## (undated) DEVICE — NEEDLE HYPO 25GA L1.5IN BLU POLYPR HUB S STL REG BVL STR

## (undated) DEVICE — Z DISCONTINUED BY MEDLINE USE 2711682 TRAY SKIN PREP DRY W/ PREM GLV

## (undated) DEVICE — TRI-LUMEN FILTERED TUBE SET WITH ACTIVATED CHARCOAL FILTER: Brand: AIRSEAL

## (undated) DEVICE — GAUZE,SPONGE,FLUFF,6"X6.75",STRL,5/TRAY: Brand: MEDLINE

## (undated) DEVICE — NEEDLE INSUF L120MM DIA2MM DISP FOR PNEUMOPERI ENDOPATH

## (undated) DEVICE — JOINT PREPARATION GUIDE: Brand: ORTHOLOC™ 2 LAPIFUSE

## (undated) DEVICE — STRIP,CLOSURE,WOUND,MEDI-STRIP,1/2X4: Brand: MEDLINE

## (undated) DEVICE — 40580 - THE PINK PAD - ADVANCED TRENDELENBURG POSITIONING KIT: Brand: 40580 - THE PINK PAD - ADVANCED TRENDELENBURG POSITIONING KIT

## (undated) DEVICE — GLOVE SURG SZ 85 CRM LTX FREE POLYISOPRENE POLYMER BEAD ANTI

## (undated) DEVICE — GARMENT COMPR STD FOR 17IN CALF UNIF THER FLOTRN

## (undated) DEVICE — SUTURE PDS + SZ 2 0 L27IN ABSRB VLT L36MM CT 1 1 2 CIR PDP339H

## (undated) DEVICE — GOWN,SURGICAL,AURORA,SLEEVE: Brand: MEDLINE

## (undated) DEVICE — BANDAGE COMPR W4INXL5YD WHT BGE POLY COT M E WRP WV HK AND

## (undated) DEVICE — PROTECTOR ULN NRV PUR FOAM HK LOOP STRP ANATOMICALLY

## (undated) DEVICE — CHLORAPREP 26ML ORANGE

## (undated) DEVICE — BLADE SAW W9XL25MM THK0.38MM CUT THK0.43MM REPL SAG OSC THN

## (undated) DEVICE — AGENT HEMSTAT 3GM PURIFIED PLNT STARCH PWD ABSRB ARISTA AH

## (undated) DEVICE — SOLUTION SURG PREP POV IOD 7.5% 4 OZ

## (undated) DEVICE — CAP SEAL COLLECT BTL BL

## (undated) DEVICE — DRESSING TRNSPAR W5XL4.5IN FLM SHT SEMIPERMEABLE WIND

## (undated) DEVICE — SYSTEM ES CUP DIA3CM PNEUMO OCCL BLLN DISP FOR CLIN POS

## (undated) DEVICE — 1016 S-DRAPE IRRIG POUCH 10/BOX: Brand: STERI-DRAPE™

## (undated) DEVICE — Device: Brand: UTERINE ELEVATOR PRO

## (undated) DEVICE — SUTURE VCRL + SZ 4-0 L27IN ABSRB UD L26MM SH 1/2 CIR VCP415H

## (undated) DEVICE — ST CHARLES PODIATRY: Brand: MEDLINE INDUSTRIES, INC.

## (undated) DEVICE — SYRINGE MED 10ML LUERLOCK TIP W/O SFTY DISP

## (undated) DEVICE — SUTURE VCRL SZ 1 L36IN ABSRB UD L36MM CT-1 1/2 CIR J947H

## (undated) DEVICE — SVMMC GYN ROBOTIC PK

## (undated) DEVICE — SYSTEM COLL CANSTR LID SEAL CAP W/O TISS TRAP FOR 3/8IN

## (undated) DEVICE — GLOVE SURG SZ 65 THK91MIL LTX FREE SYN POLYISOPRENE

## (undated) DEVICE — SUTURE MCRYL + SZ 4 0 L18IN ABSRB UD PC 3 L16MM 3 8 CIR PRIM MCP845G

## (undated) DEVICE — GOWN,SIRUS,NONRNF,SETINSLV,XL,20/CS: Brand: MEDLINE

## (undated) DEVICE — UNDERPANTS MAT L/XL KNIT SEAMLESS CLR CODE WAISTBAND

## (undated) DEVICE — Device

## (undated) DEVICE — SOLUTION IRRIG 1000ML 0.9% SOD CHL USP POUR PLAS BTL

## (undated) DEVICE — CONTAINER,SPECIMEN,4OZ,OR STRL: Brand: MEDLINE

## (undated) DEVICE — SACK GZ PERM W/ O RNG BTL ONLY F/

## (undated) DEVICE — SUTURE VCRL SZ 0 L27IN ABSRB UD L36MM CT-1 1/2 CIR J260H

## (undated) DEVICE — DRILL BIT

## (undated) DEVICE — AIRSEAL 12 MM ACCESS PORT AND PALM GRIP OBTURATOR WITH BLADELESS OPTICAL TIP, 120 MM LENGTH: Brand: AIRSEAL